# Patient Record
Sex: FEMALE | Race: BLACK OR AFRICAN AMERICAN | Employment: OTHER | URBAN - METROPOLITAN AREA
[De-identification: names, ages, dates, MRNs, and addresses within clinical notes are randomized per-mention and may not be internally consistent; named-entity substitution may affect disease eponyms.]

---

## 2017-04-20 ENCOUNTER — OFFICE VISIT (OUTPATIENT)
Dept: INTERNAL MEDICINE CLINIC | Age: 62
End: 2017-04-20

## 2017-04-20 VITALS
SYSTOLIC BLOOD PRESSURE: 110 MMHG | DIASTOLIC BLOOD PRESSURE: 60 MMHG | WEIGHT: 211 LBS | TEMPERATURE: 98.6 F | OXYGEN SATURATION: 94 % | HEART RATE: 89 BPM | HEIGHT: 63 IN | BODY MASS INDEX: 37.39 KG/M2

## 2017-04-20 DIAGNOSIS — R68.89 FLU-LIKE SYMPTOMS: Primary | ICD-10-CM

## 2017-04-20 LAB
QUICKVUE INFLUENZA TEST: POSITIVE
VALID INTERNAL CONTROL?: YES

## 2017-04-20 NOTE — MR AVS SNAPSHOT
Visit Information Date & Time Provider Department Dept. Phone Encounter #  
 4/20/2017  9:00 AM Rosario Chauhan MD Internist of [facility] [phone] [id number] Upcoming Health Maintenance Date Due Hepatitis C Screening 1955 BREAST CANCER SCRN MAMMOGRAM 7/8/2017 PAP AKA CERVICAL CYTOLOGY 6/16/2018 COLONOSCOPY 9/5/2022 DTaP/Tdap/Td series (2 - Td) 8/31/2026 Allergies as of 4/20/2017  Review Complete On: 9/1/2016 By: Rosario Chauhan MD  
  
 Severity Noted Reaction Type Reactions Advair Diskus [Fluticasone-salmeterol]   Intolerance Other (comments) Hoarseness Naproxen    Unable to Obtain Pcn [Penicillins]    Rash Current Immunizations  Reviewed on 8/28/2012 Name Date  
 TD Vaccine 1/1/1990 Not reviewed this visit You Were Diagnosed With   
  
 Codes Comments Flu-like symptoms    -  Primary ICD-10-CM: R68.89 ICD-9-CM: 780.99 Vitals BP Pulse Temp Height(growth percentile) Weight(growth percentile) SpO2  
 110/60 89 98.6 °F (37 °C) (Oral) 5' 3\" (1.6 m) 211 lb (95.7 kg) 94% BMI OB Status Smoking Status 37.38 kg/m2 Hysterectomy Former Smoker Vitals History BMI and BSA Data Body Mass Index Body Surface Area  
 37.38 kg/m 2 2.06 m 2 Preferred Pharmacy Pharmacy Name Phone [facility] [address] [place] [address] Your Updated Medication List  
  
   
This list is accurate as of: 4/20/17 10:17 AM.  Always use your most recent med list.  
  
  
  
  
 albuterol 90 mcg/actuation inhaler Commonly known as:  PROVENTIL HFA, VENTOLIN HFA, PROAIR HFA Take 2 Puffs by inhalation every four (4) hours as needed. beclomethasone 80 mcg/actuation [employer] Commonly known as:  QVAR Take 1 Puff by inhalation two (2) times a day. cyclobenzaprine 10 mg tablet Commonly known as:  FLEXERIL  
 Take 1 Tab by mouth three (3) times daily as needed for Muscle Spasm(s). ergocalciferol 50,000 unit capsule Commonly known as:  VITAMIN D2 Take 1 Cap by mouth every seven (7) days. furosemide 20 mg tablet Commonly known as:  LASIX One tablet daily  
  
 lidocaine-EPINEPHrine 1 %-1:100,000 injection Commonly known as:  XYLOCAINE 10 mL by IntraDERMal route once for 1 dose. Indications: ADMINISTRATION OF LOCAL ANESTHESIA  
  
 magnesium oxide 400 mg tablet Commonly known as:  MAG-OX Take 400 mg by mouth daily. meloxicam 15 mg tablet Commonly known as:  MOBIC Take 1 Tab by mouth daily as needed for Pain.  
  
 multivitamin tablet Commonly known as:  ONE A DAY Take 1 Tab by mouth daily. omeprazole 20 mg capsule Commonly known as:  PRILOSEC Take 1 Cap by mouth daily. potassium chloride 10 mEq tablet Commonly known as:  K-DUR, KLOR-CON Take 1 Tab by mouth daily. traMADol 50 mg tablet Commonly known as:  ULTRAM  
Take 1 Tab by mouth every six (6) hours as needed for Pain. Max Daily Amount: 200 mg. VITAMIN C 250 mg tablet Generic drug:  ascorbic acid (vitamin C) Take  by mouth. We Performed the Following AMB POC RAPID INFLUENZA TEST [09303 CPT(R)] Introducing [facility] & HEALTH SERVICES! Dear Steve Lees: 
Thank you for requesting a Xtime account. Our records indicate that you already have an active Xtime account. You can access your account anytime at [email or web address] Did you know that you can access your hospital and ER discharge instructions at any time in Xtime? You can also review all of your test results from your hospital stay or ER visit. Additional Information If you have questions, please visit the Frequently Asked Questions section of the Xtime website at [email or web address] Remember, Xtime is NOT to be used for urgent needs. For medical emergencies, dial 911. Now available from your iPhone and Android! Please provide this summary of care documentation to your next provider. Your primary care clinician is listed as Isaura Morales. If you have any questions after today's visit, please call [phone].

## 2017-04-20 NOTE — PROGRESS NOTES
1. Have you been to the ER, urgent care clinic or hospitalized since your last visit? NO.     2. Have you seen or consulted any other health care providers outside of the [address] [place] [address] since your last visit (Include any pap smears or colon screening)? NO      Do you have an Advanced Directive? NO    Would you like information on Advanced Directives?  YES

## 2017-04-20 NOTE — PROGRESS NOTES
57 y.o. BLACK OR  female who presents for evaluation. She is here complaining of 3 days of flulike syndrome. She has had numerous exposures at work apparently. Was actually at [employer] yesterday but there was an insurance snafu and they did not see her and told her she has flu but to go to the ER(?). Describes sore throat, overall achiness, mostly nonproductive cough although she has been bringing up some yellowish material occasionally the last day or so. No measured temperatures but she has been having chills. Minimal sinus congestion, no GI complaints. She did not take her flu shot this past year. Past Medical History:   Diagnosis Date    Asthma     Dr. Cruz [facility])     Lung Cancer, f/u Dr. Amee Ocampo.  Chronic obstructive pulmonary disease (HCC)     and right lung nodules, stable on serial CT last 6/14    Colon adenoma     2009 Dr. Dillon Cortez; adenoma and diverticulosis 9/12 Dr. Latricia Ariza FHx: breast cancer     Glaucoma     Dr Cristi Colbert Hypovitaminosis D 2011    IFG (impaired fasting glucose)     Morbid obesity (HCC)     peak weight 209 lbs, bmi 37 from 2/14    PUD (peptic ulcer disease)     h pylori tx'ed    Sleep apnea     on CPAP, Dr. Nonah Spurling 2010    Venous insufficiency      Current Outpatient Prescriptions   Medication Sig    furosemide (LASIX) 20 mg tablet One tablet daily    potassium chloride (K-DUR, KLOR-CON) 10 mEq tablet Take 1 Tab by mouth daily.  meloxicam (MOBIC) 15 mg tablet Take 1 Tab by mouth daily as needed for Pain.  omeprazole (PRILOSEC) 20 mg capsule Take 1 Cap by mouth daily.  cyclobenzaprine (FLEXERIL) 10 mg tablet Take 1 Tab by mouth three (3) times daily as needed for Muscle Spasm(s).  traMADol (ULTRAM) 50 mg tablet Take 1 Tab by mouth every six (6) hours as needed for Pain. Max Daily Amount: 200 mg.    lidocaine-EPINEPHrine (XYLOCAINE) 1 %-1:100,000 injection 10 mL by IntraDERMal route once for 1 dose.  Indications: ADMINISTRATION OF LOCAL ANESTHESIA    magnesium oxide (MAG-OX) 400 mg tablet Take 400 mg by mouth daily.  ascorbic acid (VITAMIN C) 250 mg tablet Take  by mouth.  beclomethasone (QVAR) 80 mcg/actuation inhaler Take 1 Puff by inhalation two (2) times a day.  ergocalciferol (VITAMIN D2) 50,000 unit capsule Take 1 Cap by mouth every seven (7) days.  albuterol (PROVENTIL HFA, VENTOLIN HFA) 90 mcg/actuation inhaler Take 2 Puffs by inhalation every four (4) hours as needed.  multivitamin (ONE A DAY) tablet Take 1 Tab by mouth daily. No current facility-administered medications for this visit. Allergies   Allergen Reactions    Advair Diskus [Fluticasone-Salmeterol] Other (comments)     Hoarseness    Naproxen Unable to Obtain    Pcn [Penicillins] Rash     Visit Vitals    /60    Pulse 89    Temp 98.6 °F (37 °C) (Oral)    Ht 5' 3\" (1.6 m)    Wt 211 lb (95.7 kg)    SpO2 94%    BMI 37.38 kg/m2    tympanic membrane's were normal.  Sinuses were nontender with mildly boggy mucosa, oropharynx otherwise benign, no adenopathy. Lungs are clear with good breath sounds. Heart showed regular rate rhythm. Abdomen soft and nontender, no hepatosplenomegaly or masses. Flu test was positive for influenza B    Assessment and plan:  1. Influenza but past the 2 day window. Symptomatic treatment for now with theraflu type meds and other OTC medications. I gave her a work excuse from 4/18-23/17. Call if progressively worsening symptoms. Emphasized vaccination when in season        Above conditions discussed at length and patient vocalized understanding.   All questions answered to patient satifaction

## 2017-04-26 ENCOUNTER — TELEPHONE (OUTPATIENT)
Dept: INTERNAL MEDICINE CLINIC | Age: 62
End: 2017-04-26

## 2017-04-27 ENCOUNTER — OFFICE VISIT (OUTPATIENT)
Dept: INTERNAL MEDICINE CLINIC | Age: 62
End: 2017-04-27

## 2017-04-27 VITALS
SYSTOLIC BLOOD PRESSURE: 112 MMHG | HEIGHT: 63 IN | WEIGHT: 213.4 LBS | RESPIRATION RATE: 16 BRPM | BODY MASS INDEX: 37.81 KG/M2 | OXYGEN SATURATION: 98 % | DIASTOLIC BLOOD PRESSURE: 74 MMHG | TEMPERATURE: 98.1 F | HEART RATE: 71 BPM

## 2017-04-27 DIAGNOSIS — J44.9 CHRONIC OBSTRUCTIVE PULMONARY DISEASE, UNSPECIFIED COPD TYPE (HCC): ICD-10-CM

## 2017-04-27 DIAGNOSIS — J04.0 LARYNGITIS: Primary | ICD-10-CM

## 2017-04-27 DIAGNOSIS — J11.1 INFLUENZA: ICD-10-CM

## 2017-04-27 DIAGNOSIS — R05.9 COUGH: ICD-10-CM

## 2017-04-27 RX ORDER — BENZONATATE 200 MG/1
200 CAPSULE ORAL
Qty: 30 CAP | Refills: 0 | Status: SHIPPED | OUTPATIENT
Start: 2017-04-27 | End: 2017-11-06

## 2017-04-27 NOTE — MR AVS SNAPSHOT
Visit Information Date & Time Provider Department Dept. Phone Encounter #  
 4/27/2017 10:30 AM Heather Rajput Internist of [address] [id number] Upcoming Health Maintenance Date Due Hepatitis C Screening 1955 BREAST CANCER SCRN MAMMOGRAM 7/8/2017 PAP AKA CERVICAL CYTOLOGY 6/16/2018 COLONOSCOPY 9/5/2022 DTaP/Tdap/Td series (2 - Td) 8/31/2026 Allergies as of 4/27/2017  Review Complete On: 4/27/2017 By: Vasquez Pickett LPN Severity Noted Reaction Type Reactions Advair Diskus [Fluticasone-salmeterol]   Intolerance Other (comments) Hoarseness Naproxen    Unable to Obtain Pcn [Penicillins]    Rash Current Immunizations  Reviewed on 8/28/2012 Name Date  
 TD Vaccine 1/1/1990 Not reviewed this visit You Were Diagnosed With   
  
 Codes Comments Laryngitis    -  Primary ICD-10-CM: J04.0 ICD-9-CM: 464.00 Cough     ICD-10-CM: R05 ICD-9-CM: 128. 2 Vitals BP Pulse Temp Resp Height(growth percentile) Weight(growth percentile) 112/74 (BP 1 Location: Left arm, BP Patient Position: Sitting) 71 98.1 °F (36.7 °C) (Oral) 16 5' 3\" (1.6 m) 213 lb 6.4 oz (96.8 kg) SpO2 BMI OB Status Smoking Status 98% 37.8 kg/m2 Hysterectomy Former Smoker BMI and BSA Data Body Mass Index Body Surface Area  
 37.8 kg/m 2 2.07 m 2 Preferred Pharmacy Pharmacy Name Phone [facility] [address] [facility] [address] Your Updated Medication List  
  
   
This list is accurate as of: 4/27/17 11:04 AM.  Always use your most recent med list.  
  
  
  
  
 albuterol 90 mcg/actuation inhaler Commonly known as:  PROVENTIL HFA, VENTOLIN HFA, PROAIR HFA Take 2 Puffs by inhalation every four (4) hours as needed. beclomethasone 80 mcg/actuation [employer] Commonly known as:  QVAR Take 1 Puff by inhalation two (2) times a day. benzonatate 200 mg capsule Commonly known as:  TESSALON Take 1 Cap by mouth three (3) times daily as needed for Cough. cyclobenzaprine 10 mg tablet Commonly known as:  FLEXERIL Take 1 Tab by mouth three (3) times daily as needed for Muscle Spasm(s). ergocalciferol 50,000 unit capsule Commonly known as:  VITAMIN D2 Take 1 Cap by mouth every seven (7) days. furosemide 20 mg tablet Commonly known as:  LASIX One tablet daily  
  
 lidocaine-EPINEPHrine 1 %-1:100,000 injection Commonly known as:  XYLOCAINE 10 mL by IntraDERMal route once for 1 dose. Indications: ADMINISTRATION OF LOCAL ANESTHESIA  
  
 magnesium oxide 400 mg tablet Commonly known as:  MAG-OX Take 400 mg by mouth daily. meloxicam 15 mg tablet Commonly known as:  MOBIC Take 1 Tab by mouth daily as needed for Pain.  
  
 multivitamin tablet Commonly known as:  ONE A DAY Take 1 Tab by mouth daily. omeprazole 20 mg capsule Commonly known as:  PRILOSEC Take 1 Cap by mouth daily. potassium chloride 10 mEq tablet Commonly known as:  K-DUR, KLOR-CON Take 1 Tab by mouth daily. traMADol 50 mg tablet Commonly known as:  ULTRAM  
Take 1 Tab by mouth every six (6) hours as needed for Pain. Max Daily Amount: 200 mg. VITAMIN C 250 mg tablet Generic drug:  ascorbic acid (vitamin C) Take  by mouth. Prescriptions Sent to Pharmacy Refills  
 benzonatate (TESSALON) 200 mg capsule 0 Sig: Take 1 Cap by mouth three (3) times daily as needed for Cough. Class: Normal  
 Pharmacy: [address] #: [phone] Route: Oral  
  
Introducing [facility] & HEALTH SERVICES! Dear Iovnne Clubs: 
Thank you for requesting a [employer] account. Our records indicate that you already have an active [employer] account. You can access your account anytime at [email or web address] Did you know that you can access your hospital and ER discharge instructions at any time in [employer]? You can also review all of your test results from your hospital stay or ER visit. Additional Information If you have questions, please visit the Frequently Asked Questions section of the LendingStar website at [email or web address] Remember, LendingStar is NOT to be used for urgent needs. For medical emergencies, dial 911. Now available from your iPhone and Android! Please provide this summary of care documentation to your next provider. Your primary care clinician is listed as Ethan Velazquez. If you have any questions after today's visit, please call [phone].

## 2017-04-27 NOTE — PROGRESS NOTES
HPI/History  Akanksha Carcamo is a 57 y.o.  female who presents for evaluation. Patient had typical flu sxs and tested positive for influenza B on 4/20 with Dr. Moni Robbins. She was past therapeutic range and not treated with tamiflu. Symptoms have been gradually improving but more recently she developed hoarseness and loss of voice. She still has a mild dry cough and has a history of COPD/asthma with medications below. No worsening pulmonary sxs and no fevers. No signs of strep. Her boss informed her to stay home but pt will need a work note. No other sxs or complaints. Patient Active Problem List   Diagnosis Code    Morbid obesity ([facility] [address] [facility] E66.01    Colon adenoma and diverticulosis Dr. Justin Lora 2012 D12.6    Sleep apnea Dr. Anjum Duarte 2010 G47.30    Venous insufficiency I87.2    Hypovitaminosis D E55.9    Hx of cancer of lung 2006 s/p resection Z85.118    IFG (impaired fasting glucose) R73.01    COPD (chronic obstructive pulmonary disease) (HCC) J44.9     Past Medical History:   Diagnosis Date    Asthma     Dr. Jose Cunningham [facility])     Lung Cancer, f/u Dr. Halie Garcia.  Chronic obstructive pulmonary disease (HCC)     and right lung nodules, stable on serial CT last 6/14    Colon adenoma     2009 Dr. Abel Mora; adenoma and diverticulosis 9/12 Dr. Shelly Turner FHx: breast cancer     Glaucoma     Dr Jaun Blakely Hypovitaminosis D 2011    IFG (impaired fasting glucose)     Morbid obesity (HCC)     peak weight 209 lbs, bmi 37 from 2/14    PUD (peptic ulcer disease)     h pylori tx'ed    Sleep apnea     on CPAP, Dr. Anjum Duarte 2010    Venous insufficiency      Past Surgical History:   Procedure Laterality Date    CARDIAC SURG PROCEDURE UNLIST  11/07    thallium negative    CT HEAD W CONT  11/07    negative    EGD      PUD on EGD, H pylori +txed 2009.  ENDOSCOPY, COLON, DIAGNOSTIC      Colon adenoma 2009, Dr. Kameron Beard.   adenoma and tics Dr. Justin Lora 9/12   [facility]  HX CATARACT REMOVAL      Dr Rah Marsh    HX HEENT      thyroid US negative 6/14    HX [address] [place] [address]    for fibroids    HX LOBECTOMY      for cancer, Dr. Chon Lopez 10/06. No adjuvant tx. She has another spot on the same side that is being followed every three months.  HX ORTHOPAEDIC      Left foot ([place]) and knee surgery x2 Dr. Wallace Toledo.  US DUPLEX LOWER EXT VEIN BILAT W MANEUVERS  6/11    negative     Social History     Social History    Marital status: SINGLE     Spouse name: N/A    Number of children: 0    Years of education: N/A     Occupational History    [place]       Social History Main Topics    Smoking status: Former Smoker     Packs/day: 0.50     Years: 20.00     Types: Cigarettes     Quit date: 1/1/1990    Smokeless tobacco: Never Used    Alcohol use No    Drug use: No    Sexual activity: Not on file     Other Topics Concern    Not on file     Social History Narrative     Family History   Problem Relation Age of Onset    Breast Cancer Mother     Hypertension Father     Other Brother      SLE    Other Brother      SLE    Other Brother      SLE     Current Outpatient Prescriptions   Medication Sig    benzonatate (TESSALON) 200 mg capsule Take 1 Cap by mouth three (3) times daily as needed for Cough.  furosemide (LASIX) 20 mg tablet One tablet daily    potassium chloride (K-DUR, KLOR-CON) 10 mEq tablet Take 1 Tab by mouth daily.  meloxicam (MOBIC) 15 mg tablet Take 1 Tab by mouth daily as needed for Pain.  omeprazole (PRILOSEC) 20 mg capsule Take 1 Cap by mouth daily.  cyclobenzaprine (FLEXERIL) 10 mg tablet Take 1 Tab by mouth three (3) times daily as needed for Muscle Spasm(s).  traMADol (ULTRAM) 50 mg tablet Take 1 Tab by mouth every six (6) hours as needed for Pain. Max Daily Amount: 200 mg.    magnesium oxide (MAG-OX) 400 mg tablet Take 400 mg by mouth daily.  ascorbic acid (VITAMIN C) 250 mg tablet Take  by mouth.     beclomethasone (QVAR) 80 mcg/actuation inhaler Take 1 Puff by inhalation two (2) times a day.  ergocalciferol (VITAMIN D2) 50,000 unit capsule Take 1 Cap by mouth every seven (7) days.  albuterol (PROVENTIL HFA, VENTOLIN HFA) 90 mcg/actuation inhaler Take 2 Puffs by inhalation every four (4) hours as needed.  multivitamin (ONE A DAY) tablet Take 1 Tab by mouth daily.  lidocaine-EPINEPHrine (XYLOCAINE) 1 %-1:100,000 injection 10 mL by IntraDERMal route once for 1 dose. Indications: ADMINISTRATION OF LOCAL ANESTHESIA     No current facility-administered medications for this visit. Allergies   Allergen Reactions    Advair Diskus [Fluticasone-Salmeterol] Other (comments)     Hoarseness    Naproxen Unable to Obtain    Pcn [Penicillins] Rash       Review of Systems  Aside from those included in HPI, remainder of ROS negative. Physical Examination  Visit Vitals    /74 (BP 1 Location: Left arm, BP Patient Position: Sitting)    Pulse 71    Temp 98.1 °F (36.7 °C) (Oral)    Resp 16    Ht 5' 3\" (1.6 m)    Wt 213 lb 6.4 oz (96.8 kg)    SpO2 98%    BMI 37.8 kg/m2       General - Alert and in no acute distress. Pt appears fairly well, comfortable, and in good spirits. Pleasant, engaging. Nontoxic. Not anxious, non-diaphoretic. Mental status - Appropriate mood, behavior, speech content, dress, and thought processes. Eyes - Pupils equal and reactive, extraocular movements intact. No erythema or discharge. Nose - No rhinorrhea. Mouth - Mucous membranes moist.  Oropharynx unremarkable. Loss of voice noted. Neck - Supple without rigidity. Lymph - No periauricular, perimandibular, or ant/post cervical tenderness or swelling. Pulm - Occasional dry cough. Complete sentences. No tachypnea, retractions, or cyanosis. Good respiratory effort. Clear to auscultation bilat. No appreciable wheezes, rales, or rhonchi currently. Cardiovascular - Normal rate, regular rhythm. No appreciable murmurs.     Assessment and Plan  1. Patient recently tested positive for influenza and has been gradually improving from this standpoint. However, she has more recently suffered laryngitis but doing fairly well overall. Strongly encouraged voice rest and supportive measures as discussed. Work note provided. Patient with mild cough without other pulmonary sxs; has a history of COPD/asthma. She will continue her current regimen and albuterol more regularly if needed; I provided tessalon. She will return/call if no improvement or if any developments. Further planning as needed. Pt happily agrees with plan. PLEASE NOTE:   This document has been produced using voice recognition software. Unrecognized errors in transcription may be present.     Geronimo Walker [employer] of [address] [place] [address]  [phone]  4/27/2017

## 2017-04-27 NOTE — LETTER
NOTIFICATION RETURN TO WORK / SCHOOL 
 
4/27/2017 11:00 AM 
 
Ms. Cristino Sesay [address] 
[address] To Whom It May Concern: 
 
Cristino Sesay is currently under the care of Merari Martinez. She will return to work/school on: 5/4/17 If there are questions or concerns please have the patient contact our office. Sincerely, ALONA Cheng

## 2017-08-30 ENCOUNTER — TELEPHONE (OUTPATIENT)
Dept: INTERNAL MEDICINE CLINIC | Age: 62
End: 2017-08-30

## 2017-08-30 DIAGNOSIS — Z00.00 PHYSICAL EXAM: ICD-10-CM

## 2017-08-30 DIAGNOSIS — R73.01 IFG (IMPAIRED FASTING GLUCOSE): Primary | ICD-10-CM

## 2017-08-30 DIAGNOSIS — E55.9 HYPOVITAMINOSIS D: ICD-10-CM

## 2017-08-30 NOTE — TELEPHONE ENCOUNTER
PT has a PE on 11/06/17- put lab orders in [facility]- PT goes to [email or web address]- mail lab orders

## 2017-10-27 LAB
25(OH)D3+25(OH)D2 SERPL-MCNC: 12.8 NG/ML (ref 30–100)
ALBUMIN SERPL-MCNC: 4.2 G/DL (ref 3.6–4.8)
ALBUMIN/GLOB SERPL: 1.3 {RATIO} (ref 1.2–2.2)
ALP SERPL-CCNC: 138 IU/L (ref 39–117)
ALT SERPL-CCNC: 16 IU/L (ref 0–32)
AST SERPL-CCNC: 16 IU/L (ref 0–40)
BILIRUB SERPL-MCNC: 0.3 MG/DL (ref 0–1.2)
BUN SERPL-MCNC: 14 MG/DL (ref 8–27)
BUN/CREAT SERPL: 20 (ref 12–28)
CALCIUM SERPL-MCNC: 9.2 MG/DL (ref 8.7–10.3)
CHLORIDE SERPL-SCNC: 102 MMOL/L (ref 96–106)
CHOLEST SERPL-MCNC: 212 MG/DL (ref 100–199)
CO2 SERPL-SCNC: 24 MMOL/L (ref 18–29)
CREAT SERPL-MCNC: 0.71 MG/DL (ref 0.57–1)
ERYTHROCYTE [DISTWIDTH] IN BLOOD BY AUTOMATED COUNT: 14 % (ref 12.3–15.4)
GFR SERPLBLD CREATININE-BSD FMLA CKD-EPI: 105 ML/MIN/1.73
GFR SERPLBLD CREATININE-BSD FMLA CKD-EPI: 91 ML/MIN/1.73
GLOBULIN SER CALC-MCNC: 3.2 G/DL (ref 1.5–4.5)
GLUCOSE SERPL-MCNC: 100 MG/DL (ref 65–99)
HBA1C MFR BLD: 5.2 % (ref 4.8–5.6)
HCT VFR BLD AUTO: 39.6 % (ref 34–46.6)
HDLC SERPL-MCNC: 92 MG/DL
HGB BLD-MCNC: 12.9 G/DL (ref 11.1–15.9)
INTERPRETATION, 910389: NORMAL
LDLC SERPL CALC-MCNC: 109 MG/DL (ref 0–99)
MCH RBC QN AUTO: 30.3 PG (ref 26.6–33)
MCHC RBC AUTO-ENTMCNC: 32.6 G/DL (ref 31.5–35.7)
MCV RBC AUTO: 93 FL (ref 79–97)
PLATELET # BLD AUTO: 253 X10E3/UL (ref 150–379)
POTASSIUM SERPL-SCNC: 4.4 MMOL/L (ref 3.5–5.2)
PROT SERPL-MCNC: 7.4 G/DL (ref 6–8.5)
RBC # BLD AUTO: 4.26 X10E6/UL (ref 3.77–5.28)
SODIUM SERPL-SCNC: 144 MMOL/L (ref 134–144)
TRIGL SERPL-MCNC: 53 MG/DL (ref 0–149)
VLDLC SERPL CALC-MCNC: 11 MG/DL (ref 5–40)
WBC # BLD AUTO: 5.4 X10E3/UL (ref 3.4–10.8)

## 2017-11-04 PROBLEM — E66.9 OBESITY (BMI 30-39.9): Status: ACTIVE | Noted: 2017-11-04

## 2017-11-06 ENCOUNTER — OFFICE VISIT (OUTPATIENT)
Dept: INTERNAL MEDICINE CLINIC | Age: 62
End: 2017-11-06

## 2017-11-06 VITALS
WEIGHT: 217.6 LBS | SYSTOLIC BLOOD PRESSURE: 128 MMHG | OXYGEN SATURATION: 97 % | HEART RATE: 81 BPM | BODY MASS INDEX: 38.55 KG/M2 | DIASTOLIC BLOOD PRESSURE: 87 MMHG | TEMPERATURE: 98.2 F | HEIGHT: 63 IN | RESPIRATION RATE: 14 BRPM

## 2017-11-06 DIAGNOSIS — E66.9 OBESITY (BMI 30-39.9): ICD-10-CM

## 2017-11-06 DIAGNOSIS — D12.6 COLON ADENOMA: ICD-10-CM

## 2017-11-06 DIAGNOSIS — R73.01 IFG (IMPAIRED FASTING GLUCOSE): ICD-10-CM

## 2017-11-06 DIAGNOSIS — J44.9 CHRONIC OBSTRUCTIVE PULMONARY DISEASE, UNSPECIFIED COPD TYPE (HCC): ICD-10-CM

## 2017-11-06 DIAGNOSIS — Z12.31 SCREENING MAMMOGRAM, ENCOUNTER FOR: ICD-10-CM

## 2017-11-06 DIAGNOSIS — I87.2 VENOUS INSUFFICIENCY: ICD-10-CM

## 2017-11-06 DIAGNOSIS — Z00.00 PHYSICAL EXAM: Primary | ICD-10-CM

## 2017-11-06 DIAGNOSIS — E55.9 HYPOVITAMINOSIS D: ICD-10-CM

## 2017-11-06 DIAGNOSIS — R07.9 CHEST PAIN, UNSPECIFIED TYPE: ICD-10-CM

## 2017-11-06 DIAGNOSIS — G47.33 OBSTRUCTIVE SLEEP APNEA SYNDROME: ICD-10-CM

## 2017-11-06 RX ORDER — OMEPRAZOLE 40 MG/1
40 CAPSULE, DELAYED RELEASE ORAL DAILY
Qty: 90 CAP | Refills: 3 | Status: SHIPPED | OUTPATIENT
Start: 2017-11-06 | End: 2018-07-18

## 2017-11-06 RX ORDER — ERGOCALCIFEROL 1.25 MG/1
50000 CAPSULE ORAL
Qty: 13 CAP | Refills: 3 | Status: SHIPPED | OUTPATIENT
Start: 2017-11-06 | End: 2018-11-20 | Stop reason: SDUPTHER

## 2017-11-06 NOTE — MR AVS SNAPSHOT
Visit Information Date & Time Provider Department Dept. Phone Encounter #  
 11/6/2017 11:30 AM Betty Larose MD [facility] [phone] [id number] Your Appointments 5/14/2018  1:45 PM  
Office Visit with Betty Larose MD  
[facility] [address]) Appt Note: 6 month f/u  
 [address] [facility], [address] [place]  
  
   
 [address] [facility] Upcoming Health Maintenance Date Due Hepatitis C Screening 1955 BREAST CANCER SCRN MAMMOGRAM 7/8/2017 INFLUENZA AGE 9 TO ADULT 8/1/2017 COLONOSCOPY 9/5/2022 DTaP/Tdap/Td series (2 - Td) 8/31/2026 Allergies as of 11/6/2017  Review Complete On: 11/6/2017 By: Vera Alonso Severity Noted Reaction Type Reactions Advair Diskus [Fluticasone-salmeterol]   Intolerance Other (comments) Hoarseness Naproxen    Unable to Obtain Pcn [Penicillins]    Rash Current Immunizations  Reviewed on 8/28/2012 Name Date  
 TD Vaccine 1/1/1990 Not reviewed this visit You Were Diagnosed With   
  
 Codes Comments Screening mammogram, encounter for    -  Primary ICD-10-CM: Z12.31 
ICD-9-CM: V76.12 Chronic obstructive pulmonary disease, unspecified COPD type ([facility]     ICD-10-CM: J44.9 ICD-9-CM: 024 Obesity (BMI 30-39. 9)     ICD-10-CM: E66.9 ICD-9-CM: 278.00 IFG (impaired fasting glucose)     ICD-10-CM: R73.01 
ICD-9-CM: 790.21 Colon adenoma     ICD-10-CM: D12.6 ICD-9-CM: 211.3 Hypovitaminosis D     ICD-10-CM: E55.9 ICD-9-CM: 268.9 Obstructive sleep apnea syndrome     ICD-10-CM: G47.33 
ICD-9-CM: 327.23 Venous insufficiency     ICD-10-CM: I87.2 ICD-9-CM: 459.81 Vitals BP Pulse Temp Resp Height(growth percentile) Weight(growth percentile)  128/87 (BP 1 Location: Left arm, BP Patient Position: Sitting) 81 98.2 °F (36.8 °C) (Oral) 14 5' 3\" (1.6 m) 217 lb 9.6 oz (98.7 kg) SpO2 BMI OB Status Smoking Status 97% 38.55 kg/m2 Hysterectomy Former Smoker Vitals History BMI and BSA Data Body Mass Index Body Surface Area 38.55 kg/m 2 2.09 m 2 Preferred Pharmacy Pharmacy Name Phone [facility] [address], [address] Your Updated Medication List  
  
   
This list is accurate as of: 11/6/17 12:36 PM.  Always use your most recent med list.  
  
  
  
  
 albuterol 90 mcg/actuation inhaler Commonly known as:  PROVENTIL HFA, VENTOLIN HFA, PROAIR HFA Take 2 Puffs by inhalation every four (4) hours as needed. beclomethasone 80 mcg/actuation [employer] Commonly known as:  QVAR Take 1 Puff by inhalation two (2) times a day. cyclobenzaprine 10 mg tablet Commonly known as:  FLEXERIL Take 1 Tab by mouth three (3) times daily as needed for Muscle Spasm(s). ergocalciferol 50,000 unit capsule Commonly known as:  VITAMIN D2 Take 1 Cap by mouth every seven (7) days. furosemide 20 mg tablet Commonly known as:  LASIX One tablet daily  
  
 lidocaine-EPINEPHrine 1 %-1:100,000 injection Commonly known as:  XYLOCAINE 10 mL by IntraDERMal route once for 1 dose. Indications: ADMINISTRATION OF LOCAL ANESTHESIA  
  
 magnesium oxide 400 mg tablet Commonly known as:  MAG-OX Take 400 mg by mouth daily. multivitamin tablet Commonly known as:  ONE A DAY Take 1 Tab by mouth daily. omeprazole 20 mg capsule Commonly known as:  PRILOSEC Take 1 Cap by mouth daily. potassium chloride 10 mEq tablet Commonly known as:  KLOR-CON Take 1 Tab by mouth daily. VITAMIN C 250 mg tablet Generic drug:  ascorbic acid (vitamin C) Take  by mouth. We Performed the Following REFERRAL TO GASTROENTEROLOGY [JSS39 Custom] To-Do List   
 11/04/2017 Imaging:  AIME MAMMO BI SCREENING INCL CAD Referral Information Referral ID Referred By Referred To  
  
 [id number] [facility], MD Dionicio Nevarez [address] [facility] [place], [address] Phone: [phone] Fax: [phone] Visits Status Start Date End Date 1 New Request 11/4/17 11/4/18 If your referral has a status of pending review or denied, additional information will be sent to support the outcome of this decision. Introducing [facility]! Dear Herber Case: 
Thank you for requesting a [employer] account. Our records indicate that you have previously registered for a [employer] account but its currently inactive. Please call our [employer] support line at [phone]. Additional Information If you have questions, please visit the Frequently Asked Questions section of the [employer] website at [email or web address] Remember, [employer] is NOT to be used for urgent needs. For medical emergencies, dial 911. Now available from your iPhone and Android! Please provide this summary of care documentation to your next provider. Your primary care clinician is listed as Mirella Braun. If you have any questions after today's visit, please call [phone].

## 2017-11-06 NOTE — PROGRESS NOTES
1. Have you been to the ER, urgent care clinic or hospitalized since your last visit? YES. [facility]17 [facility]    2. Have you seen or consulted any other health care providers outside of the [address] [place] [address] since your last visit (Include any pap smears or colon screening)? YES      Do you have an Advanced Directive? NO    Would you like information on Advanced Directives?  NO

## 2017-11-13 ENCOUNTER — HOSPITAL ENCOUNTER (OUTPATIENT)
Dept: MAMMOGRAPHY | Age: 62
Discharge: HOME | End: 2017-11-13
Attending: INTERNAL MEDICINE
Payer: COMMERCIAL

## 2017-11-13 DIAGNOSIS — Z12.31 VISIT FOR SCREENING MAMMOGRAM: ICD-10-CM

## 2017-11-13 PROCEDURE — 77067 SCR MAMMO BI INCL CAD: CPT

## 2017-11-29 ENCOUNTER — OFFICE VISIT (OUTPATIENT)
Dept: INTERNAL MEDICINE CLINIC | Age: 62
End: 2017-11-29

## 2017-11-29 VITALS
RESPIRATION RATE: 14 BRPM | SYSTOLIC BLOOD PRESSURE: 126 MMHG | DIASTOLIC BLOOD PRESSURE: 70 MMHG | HEIGHT: 63 IN | BODY MASS INDEX: 38.7 KG/M2 | OXYGEN SATURATION: 95 % | TEMPERATURE: 98.4 F | WEIGHT: 218.4 LBS | HEART RATE: 101 BPM
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
## 2017-11-29 DIAGNOSIS — S09.90XA INJURY OF HEAD, INITIAL ENCOUNTER: Primary | ICD-10-CM

## 2017-11-29 NOTE — MR AVS SNAPSHOT
Visit Information Date & Time Provider Department Dept. Phone Encounter #  
 11/29/2017 11:30 AM Kelsea Cochran [place] [facility] [place] [phone] [id number] Your Appointments 5/14/2018  1:45 PM  
Office Visit with El Ruano MD  
[facility]) Appt Note: 6 month f/u  
 [address] [facility], [address] [address] [address]  
  
   
 [address], [address] Upcoming Health Maintenance Date Due Hepatitis C Screening 1955 BREAST CANCER SCRN MAMMOGRAM 11/13/2019 COLONOSCOPY 9/5/2022 DTaP/Tdap/Td series (2 - Td) 8/31/2026 Allergies as of 11/29/2017  Review Complete On: 11/29/2017 By: ALONA Coulter Severity Noted Reaction Type Reactions Advair Diskus [Fluticasone-salmeterol]   Intolerance Other (comments) Hoarseness Naproxen    Unable to Obtain Pcn [Penicillins]    Rash Current Immunizations  Reviewed on 8/28/2012 Name Date  
 TD Vaccine 1/1/1990 Not reviewed this visit Vitals BP Pulse Temp Resp Height(growth percentile) Weight(growth percentile) 126/70 (BP 1 Location: Right arm, BP Patient Position: Sitting) (!) 101 98.4 °F (36.9 °C) (Oral) 14 5' 3\" (1.6 m) 218 lb 6.4 oz (99.1 kg) SpO2 BMI OB Status Smoking Status 95% 38.69 kg/m2 Hysterectomy Former Smoker Vitals History BMI and BSA Data Body Mass Index Body Surface Area  
 38.69 kg/m 2 2.1 m 2 Preferred Pharmacy [facility] [address] [facility] [address] Your Updated Medication List  
  
   
This list is accurate as of: 11/29/17 11:47 AM.  Always use your most recent med list.  
  
  
  
  
 albuterol 90 mcg/actuation inhaler Commonly known as:  PROVENTIL HFA, VENTOLIN HFA, PROAIR HFA Take 2 Puffs by inhalation every four (4) hours as needed. beclomethasone 80 mcg/actuation [employer] Commonly known as:  QVAR Take 1 Puff by inhalation two (2) times a day. cyclobenzaprine 10 mg tablet Commonly known as:  FLEXERIL Take 1 Tab by mouth three (3) times daily as needed for Muscle Spasm(s). ergocalciferol 50,000 unit capsule Commonly known as:  VITAMIN D2 Take 1 Cap by mouth every seven (7) days. furosemide 20 mg tablet Commonly known as:  LASIX One tablet daily  
  
 lidocaine-EPINEPHrine 1 %-1:100,000 injection Commonly known as:  XYLOCAINE 10 mL by IntraDERMal route once for 1 dose. Indications: ADMINISTRATION OF LOCAL ANESTHESIA  
  
 magnesium oxide 400 mg tablet Commonly known as:  MAG-OX Take 400 mg by mouth daily. multivitamin tablet Commonly known as:  ONE A DAY Take 1 Tab by mouth daily. omeprazole 40 mg capsule Commonly known as:  PRILOSEC Take 1 Cap by mouth daily. potassium chloride 10 mEq tablet Commonly known as:  KLOR-CON Take 1 Tab by mouth daily. VITAMIN C 250 mg tablet Generic drug:  ascorbic acid (vitamin C) Take  by mouth. Introducing [facility] & HEALTH SERVICES! Dear Anuja Huitron: 
Thank you for requesting a [employer] account. Our records indicate that you have previously registered for a [employer] account but its currently inactive. Please call our [employer] support line at [phone]. Additional Information If you have questions, please visit the Frequently Asked Questions section of the [employer] website at [email or web address] ([email or web address])[email or web address] Remember, [employer] is NOT to be used for urgent needs. For medical emergencies, dial 911. Now available from your iPhone and Android! Please provide this summary of care documentation to your next provider. Your primary care clinician is listed as Charles Bales. If you have any questions after today's visit, please call [phone].

## 2017-11-29 NOTE — PROGRESS NOTES
HPI/History  Sulma Newman is a 60 y.o.  female who presents for evaluation. Pt reports tripping over a sidewalk yesterday morning while still dark and fell. She hit her left eyebrow but sustained no other injuries. Localized area became swollen and tender with no significant bruising. Miniscule abrasions without bleeding. Area  but this and the swelling are improving. No issues with vision, significant or worsening HAs, or other neurological or concerning sxs. Not on blood thinners. No other complaints. Patient Active Problem List   Diagnosis Code    Colon adenoma and diverticulosis Dr. Migdalia Dewitt 2012 D12.6    Sleep apnea Dr. Kristen Pallas 2010 G47.30    Venous insufficiency I87.2    Hypovitaminosis D E55.9    Hx of cancer of lung 2006 s/p resection Z85.118    IFG (impaired fasting glucose) R73.01    COPD (chronic obstructive pulmonary disease) (HCC) J44.9    Obesity (BMI 30-39. 9) E66.9     Past Medical History:   Diagnosis Date    Asthma     Dr. Estefanía Montgomery [facility])     Lung Cancer, f/u Dr. Rosa Isela Coelho.  Chronic obstructive pulmonary disease (HCC)     and right lung nodules, stable on serial CT last 6/14    Colon adenoma     2009 Dr. Susanna Murray; adenoma and diverticulosis 9/12 Dr. April Martines FHx: breast cancer     Glaucoma     Dr Levar Johnson Hypovitaminosis D 2011    IFG (impaired fasting glucose)     Morbid obesity (HCC)     peak weight 209 lbs, bmi 37 from 2/14    PUD (peptic ulcer disease)     h pylori tx'ed    Sleep apnea     on CPAP, Dr. Kristen Pallas 2010    Venous insufficiency      Past Surgical History:   Procedure Laterality Date    CARDIAC SURG PROCEDURE UNLIST  11/07    thallium negative    CT HEAD W CONT  11/07    negative    EGD      PUD on EGD, H pylori +txed 2009.  ENDOSCOPY, COLON, DIAGNOSTIC      Colon adenoma 2009, Dr. Zack Francisco.   adenoma and tics Dr. Migdalia Dewitt 9/12    Hakeem Samson    HX HEENT thyroid US negative 6/14    HX HYSTERECTOMY  1993    for fibroids    HX LOBECTOMY      for cancer, Dr. Rosa Isela Coelho 10/06. No adjuvant tx. She has another spot on the same side that is being followed every three months.  HX ORTHOPAEDIC      Left foot ([place]) and knee surgery x2 Dr. Hussain Ibrahim.  US DUPLEX LOWER EXT VEIN BILAT W MANEUVERS  6/11    negative     Social History     Social History    Marital status: SINGLE     Spouse name: N/A    Number of children: 0    Years of education: N/A     Occupational History    [place]       Social History Main Topics    Smoking status: Former Smoker     Packs/day: 0.50     Years: 20.00     Types: Cigarettes     Quit date: 1/1/1990    Smokeless tobacco: Never Used    Alcohol use No    Drug use: No    Sexual activity: Not on file     Other Topics Concern    Not on file     Social History Narrative     Family History   Problem Relation Age of Onset    Breast Cancer Mother     Other Brother      SLE    Other Brother      SLE    Other Brother      SLE    Hypertension Father      Current Outpatient Prescriptions   Medication Sig    ergocalciferol (VITAMIN D2) 50,000 unit capsule Take 1 Cap by mouth every seven (7) days.  furosemide (LASIX) 20 mg tablet One tablet daily    potassium chloride (K-DUR, KLOR-CON) 10 mEq tablet Take 1 Tab by mouth daily.  cyclobenzaprine (FLEXERIL) 10 mg tablet Take 1 Tab by mouth three (3) times daily as needed for Muscle Spasm(s).  magnesium oxide (MAG-OX) 400 mg tablet Take 400 mg by mouth daily.  ascorbic acid (VITAMIN C) 250 mg tablet Take  by mouth.  beclomethasone (QVAR) 80 mcg/actuation inhaler Take 1 Puff by inhalation two (2) times a day.  albuterol (PROVENTIL HFA, VENTOLIN HFA) 90 mcg/actuation inhaler Take 2 Puffs by inhalation every four (4) hours as needed.  multivitamin (ONE A DAY) tablet Take 1 Tab by mouth daily.  omeprazole (PRILOSEC) 40 mg capsule Take 1 Cap by mouth daily.  lidocaine-EPINEPHrine (XYLOCAINE) 1 %-1:100,000 injection 10 mL by IntraDERMal route once for 1 dose. Indications: ADMINISTRATION OF LOCAL ANESTHESIA     No current facility-administered medications for this visit. Allergies   Allergen Reactions    Advair Diskus [Fluticasone-Salmeterol] Other (comments)     Hoarseness    Naproxen Unable to Obtain    Pcn [Penicillins] Rash       Review of Systems  Aside from those included in HPI, remainder of complete ROS negative. Physical Examination  Visit Vitals    /70 (BP 1 Location: Right arm, BP Patient Position: Sitting)    Pulse (!) 101    Temp 98.4 °F (36.9 °C) (Oral)    Resp 14    Ht 5' 3\" (1.6 m)    Wt 218 lb 6.4 oz (99.1 kg)    SpO2 95%    BMI 38.69 kg/m2     General - Alert and in no acute distress. Pt appears well, comfortable, and in good spirits. Pleasant, engaging. Nontoxic. Not anxious, non-diaphoretic. Mental status - Appropriate mood, behavior, speech content, dress, and thought processes. ~5-7 extremely small abrasions which are very superficial almost to the point of not breaking skin noted around lateral left eyebrow and left forehead. Minimal swelling currently which is soft and mildly tender. No overt bruising/discoloration or warmth. No palpable bony abnormalities. No Raccoon or [place]. No other signs of injury. Pupils equal and reactive, extraocular movements intact. Vision intact. No other findings, including focal or movement disorder, noted. Assessment and Plan  1. Fall with head injury - Affected area around left lateral eyebrow; no other injuries. Looks to be soft tissue with no sign of fracture. No neurological or other concerning sxs. Discussed care for the very superficial abrasions along with supportive measures such as analgesics and cold compresses for the area. She will monitor and return/call if any ominous developments as discussed. Further planning as warranted. Pt happily agrees with plan.     PLEASE NOTE: This document has been produced using voice recognition software. Unrecognized errors in transcription may be present.     Tenisha Charly [employer] [facility]  [phone]  11/29/2017

## 2017-11-29 NOTE — PROGRESS NOTES
1. Have you been to the ER, urgent care clinic or hospitalized since your last visit? NO.     2. Have you seen or consulted any other health care providers outside of the [facility] since your last visit (Include any pap smears or colon screening)? NO      Do you have an Advanced Directive? NO    Would you like information on Advanced Directives?  NO

## 2018-04-13 ENCOUNTER — OFFICE VISIT (OUTPATIENT)
Dept: INTERNAL MEDICINE CLINIC | Age: 63
End: 2018-04-13

## 2018-04-13 ENCOUNTER — OFFICE VISIT (OUTPATIENT)
Dept: VASCULAR SURGERY | Age: 63
End: 2018-04-13

## 2018-04-13 ENCOUNTER — TELEPHONE (OUTPATIENT)
Dept: INTERNAL MEDICINE CLINIC | Age: 63
End: 2018-04-13

## 2018-04-13 VITALS
SYSTOLIC BLOOD PRESSURE: 122 MMHG | OXYGEN SATURATION: 97 % | DIASTOLIC BLOOD PRESSURE: 68 MMHG | RESPIRATION RATE: 18 BRPM | BODY MASS INDEX: 38.8 KG/M2 | HEART RATE: 81 BPM | TEMPERATURE: 97.7 F | HEIGHT: 63 IN | WEIGHT: 219 LBS

## 2018-04-13 DIAGNOSIS — M79.671 RIGHT FOOT PAIN: Primary | ICD-10-CM

## 2018-04-13 DIAGNOSIS — M25.474 SWELLING OF FOOT JOINT, RIGHT: ICD-10-CM

## 2018-04-13 DIAGNOSIS — M79.671 RIGHT FOOT PAIN: ICD-10-CM

## 2018-04-13 DIAGNOSIS — G89.29 CHRONIC PAIN OF RIGHT ANKLE: Primary | ICD-10-CM

## 2018-04-13 DIAGNOSIS — M25.571 CHRONIC PAIN OF RIGHT ANKLE: Primary | ICD-10-CM

## 2018-04-13 RX ORDER — PREDNISONE 20 MG/1
20 TABLET ORAL DAILY
Qty: 5 TAB | Refills: 0 | Status: SHIPPED | OUTPATIENT
Start: 2018-04-13 | End: 2018-07-18

## 2018-04-13 RX ORDER — HYDROCODONE BITARTRATE AND ACETAMINOPHEN 5; 325 MG/1; MG/1
1 TABLET ORAL
Qty: 30 TAB | Refills: 0 | Status: SHIPPED | OUTPATIENT
Start: 2018-04-13 | End: 2018-07-18

## 2018-04-13 RX ORDER — INDOMETHACIN 50 MG/1
CAPSULE ORAL 3 TIMES DAILY
COMMUNITY
End: 2018-07-18

## 2018-04-13 NOTE — PROGRESS NOTES
60 y.o. BLACK OR  female who presents for evaluation. She reports that she injured herself in [facility] time frame while at work. 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 She hurt her foot then but hurt her bilat knees and left shoulder more. She was referred to Dr Agus Mckinney and received meds and pills and eventually gotr better. However, the right foot has become more problematic. It's hurt she claims the whole time. In the last 1-2 weeks, she's noted swelling in the dorsum of the foot fionally ended up going to see urgent care on 4/11/18. Films were neg for fx, she was given indocin 50 tid prn but it has not helped. Reports no redfness, red streaking, fever. She can walk and bear weight. There;s swelling in the ankle/calf but not much worse than the left side    Past Medical History:   Diagnosis Date    Asthma     Dr. Stefanie Medeiros [facility])     Lung Cancer, f/u Dr. Jean-Paul Dallas.  Chronic obstructive pulmonary disease (HCC)     and right lung nodules, stable on serial CT last 6/14    Colon adenoma     2009 Dr. Amy Jansen; adenoma and diverticulosis 9/12 Dr. Shala Chamorro; 12/26/17    FHx: breast cancer     Glaucoma     Dr Aurora Gaines Hypovitaminosis D 2011    IFG (impaired fasting glucose)     Morbid obesity (HCC)     peak weight 209 lbs, bmi 37 from 2/14    PUD (peptic ulcer disease)     h pylori tx'ed    Sleep apnea     on CPAP, Dr. Kenya Richter 2010    Venous insufficiency      Past Surgical History:   Procedure Laterality Date    CARDIAC SURG PROCEDURE UNLIST  11/07    thallium negative    CT HEAD W CONT  11/07    negative    EGD      PUD on EGD, H pylori +txed 2009.       Mitul Soria 2009 adenoma; Dr Shala Chamorro 9/12  adenoma and tics; 12/26/17 sessile serrated and tubular adenomas and tics    HX HEENT      thyroid US negative 6/14    HX HYSTERECTOMY  1993    for fibroids    HX LOBECTOMY      for cancer, Dr. Juany Galdamez 10/06. No adjuvant tx. She has another spot on the same side that is being followed every three months.  HX ORTHOPAEDIC      Left foot ([place]) and knee surgery x2 Dr. Marlo Dubon.  US DUPLEX LOWER EXT VEIN BILAT W MANEUVERS  6/11    negative     Social History     Social History    Marital status: SINGLE     Spouse name: N/A    Number of children: 0    Years of education: N/A     Occupational History    [place]       Social History Main Topics    Smoking status: Former Smoker     Packs/day: 0.50     Years: 20.00     Types: Cigarettes     Quit date: 1/1/1990    Smokeless tobacco: Never Used    Alcohol use No    Drug use: No    Sexual activity: Not on file     Other Topics Concern    Not on file     Social History Narrative     Current Outpatient Prescriptions   Medication Sig    indomethacin (INDOCIN) 50 mg capsule Take  by mouth three (3) times daily.  HYDROcodone-acetaminophen (NORCO) 5-325 mg per tablet Take 1 Tab by mouth every eight (8) hours as needed for Pain. Max Daily Amount: 3 Tabs.  predniSONE (DELTASONE) 20 mg tablet Take 1 Tab by mouth daily.  omeprazole (PRILOSEC) 40 mg capsule Take 1 Cap by mouth daily.  ergocalciferol (VITAMIN D2) 50,000 unit capsule Take 1 Cap by mouth every seven (7) days.  furosemide (LASIX) 20 mg tablet One tablet daily    potassium chloride (K-DUR, KLOR-CON) 10 mEq tablet Take 1 Tab by mouth daily.  cyclobenzaprine (FLEXERIL) 10 mg tablet Take 1 Tab by mouth three (3) times daily as needed for Muscle Spasm(s).  magnesium oxide (MAG-OX) 400 mg tablet Take 400 mg by mouth daily.  ascorbic acid (VITAMIN C) 250 mg tablet Take  by mouth.  beclomethasone (QVAR) 80 mcg/actuation inhaler Take 1 Puff by inhalation two (2) times a day.     albuterol (PROVENTIL HFA, VENTOLIN HFA) 90 mcg/actuation inhaler Take 2 Puffs by inhalation every four (4) hours as needed.  multivitamin (ONE A DAY) tablet Take 1 Tab by mouth daily.  lidocaine-EPINEPHrine (XYLOCAINE) 1 %-1:100,000 injection 10 mL by IntraDERMal route once for 1 dose. Indications: ADMINISTRATION OF LOCAL ANESTHESIA     No current facility-administered medications for this visit. Allergies   Allergen Reactions    Advair Diskus [Fluticasone-Salmeterol] Other (comments)     Hoarseness    Naproxen Unable to Obtain    Pcn [Penicillins] Rash     REVIEW OF SYSTEMS:   Ophtho  no vision change or eye pain  Oral  no mouth pain, tongue or tooth problems  Ears  no hearing loss, ear pain, fullness, no swallowing problems  Cardiac  no CP, PND, orthopnea, edema, palpitations or syncope  Chest  no breast masses  Resp  no wheezing, chronic coughing, dyspnea  GI  no heartburn, nausea, vomiting, change in bowel habits, bleeding, hemorrhoids  Urinary  no dysuria, hematuria, flank pain, urgency, frequency  Genitals  no genital lesions, discharge, masses, ulceration, warts    Visit Vitals    /68 (BP 1 Location: Left arm, BP Patient Position: Sitting)    Pulse 81    Temp 97.7 °F (36.5 °C) (Oral)    Resp 18    Ht 5' 3\" (1.6 m)    Wt 219 lb (99.3 kg)    SpO2 97%    BMI 38.79 kg/m2   edema 1-2+ in dorsum of the right foot extending into the ankle. No clear redness but marked soft tissue tenderness in the dorsum and in the ankle sheri, neg sheridan's, pulses 2+, no clear signs of cellulitis otherwise. rom dec for ankle and foot due to pain. Left foot with 0-1+ ankle adema, otherwise normal and full rom    Assessment and plan:  1. Right foot/ankle pain and swelling xmonths although acutely worse. Will get doppler r/o dvt. Doubt cellulitis so hold off abx. Check labs, empiric prednisone and norco for pain while awaiting consult Dr Madeleine Montiel. Work excuse Marlee Gave, call w update      Above conditions discussed at length and patient vocalized understanding.   All questions answered to patient satisfaction

## 2018-04-13 NOTE — PROCEDURES
[facility]  *** FINAL REPORT ***    Name: Mary Urban  MRN: [id number]       Outpatient  : 04 May 1955  HIS Order #: [id number]  [address] [facility] Visit #: [id number]  Date: 2018    TYPE OF TEST: Peripheral Venous Testing    REASON FOR TEST  Edema, Limb Pain    Right Leg:-  Deep venous thrombosis:           No  Superficial venous thrombosis:    Not examined  Deep venous insufficiency:        Not examined  Superficial venous insufficiency: Not examined      INTERPRETATION/FINDINGS  Duplex images were obtained using 2-D gray scale, color flow and  spectral doppler analysis. Right leg :  1. No evidence of deep vein thrombosis in the right common femoral,  proximal deep femoral, femoral, popliteal, posterior tibial and  peroneal veins. 2. Biphasic right posterior tibial artery flow. 3. Patent contralateral common femoral vein with normal venous  hemodynamics. ADDITIONAL COMMENTS    I have personally reviewed the data relevant to the interpretation of  this  study. TECHNOLOGIST: Aftab Weston RVT, RDMS  Signed: 2018 03:07 PM    PHYSICIAN: Rufino Polk MD  Signed: 2018 08:29 AM

## 2018-04-13 NOTE — PROGRESS NOTES
1. Have you been to the ER, urgent care clinic or hospitalized since your last visit? YES. [facility] 4/7/18    2. Have you seen or consulted any other health care providers outside of the [address] [place] [address] since your last visit (Include any pap smears or colon screening)? NO      Do you have an Advanced Directive? NO    Would you like information on Advanced Directives? NO    Chief Complaint   Patient presents with    Foot Pain     right foot pain. Pt stated the whole foot hurts.  She has had 2 injuries to it in the past. [facility] and [facility] stated she had a foot sprain

## 2018-04-13 NOTE — TELEPHONE ENCOUNTER
[facility] calling says they need a authorization for the patients Ultrasound for today at 2:45pm.    [address] [place] [address] Advise   Fax [phone]  Phone [phone]

## 2018-04-14 LAB
ANA SER QL: NEGATIVE
BUN SERPL-MCNC: 12 MG/DL (ref 8–27)
BUN/CREAT SERPL: 21 (ref 12–28)
CALCIUM SERPL-MCNC: 9 MG/DL (ref 8.7–10.3)
CHLORIDE SERPL-SCNC: 103 MMOL/L (ref 96–106)
CO2 SERPL-SCNC: 26 MMOL/L (ref 18–29)
CREAT SERPL-MCNC: 0.57 MG/DL (ref 0.57–1)
ERYTHROCYTE [DISTWIDTH] IN BLOOD BY AUTOMATED COUNT: 13.3 % (ref 12.3–15.4)
ERYTHROCYTE [SEDIMENTATION RATE] IN BLOOD BY WESTERGREN METHOD: 19 MM/HR (ref 0–40)
GFR SERPLBLD CREATININE-BSD FMLA CKD-EPI: 114 ML/MIN/1.73
GFR SERPLBLD CREATININE-BSD FMLA CKD-EPI: 99 ML/MIN/1.73
GLUCOSE SERPL-MCNC: 100 MG/DL (ref 65–99)
HCT VFR BLD AUTO: 35 % (ref 34–46.6)
HGB BLD-MCNC: 11.6 G/DL (ref 11.1–15.9)
MCH RBC QN AUTO: 30.3 PG (ref 26.6–33)
MCHC RBC AUTO-ENTMCNC: 33.1 G/DL (ref 31.5–35.7)
MCV RBC AUTO: 91 FL (ref 79–97)
PLATELET # BLD AUTO: 274 X10E3/UL (ref 150–379)
POTASSIUM SERPL-SCNC: 4.4 MMOL/L (ref 3.5–5.2)
RBC # BLD AUTO: 3.83 X10E6/UL (ref 3.77–5.28)
SEE BELOW:, 164879: NORMAL
SODIUM SERPL-SCNC: 142 MMOL/L (ref 134–144)
URATE SERPL-MCNC: 6 MG/DL (ref 2.5–7.1)
WBC # BLD AUTO: 6.5 X10E3/UL (ref 3.4–10.8)

## 2018-04-17 ENCOUNTER — OFFICE VISIT (OUTPATIENT)
Dept: ORTHOPEDIC SURGERY | Age: 63
End: 2018-04-17

## 2018-04-17 VITALS
OXYGEN SATURATION: 98 % | DIASTOLIC BLOOD PRESSURE: 87 MMHG | WEIGHT: 215.4 LBS | HEIGHT: 63 IN | BODY MASS INDEX: 38.16 KG/M2 | SYSTOLIC BLOOD PRESSURE: 148 MMHG | RESPIRATION RATE: 16 BRPM | HEART RATE: 77 BPM | TEMPERATURE: 97.4 F

## 2018-04-17 DIAGNOSIS — M79.671 RIGHT FOOT PAIN: ICD-10-CM

## 2018-04-17 DIAGNOSIS — M19.071 PRIMARY OSTEOARTHRITIS OF RIGHT FOOT: Primary | ICD-10-CM

## 2018-04-17 DIAGNOSIS — M25.474 SWELLING OF FOOT JOINT, RIGHT: ICD-10-CM

## 2018-04-17 RX ORDER — TRAMADOL HYDROCHLORIDE 50 MG/1
50-100 TABLET ORAL
Qty: 20 TAB | Refills: 0 | Status: SHIPPED | OUTPATIENT
Start: 2018-04-17 | End: 2018-07-18

## 2018-04-17 NOTE — LETTER
NOTIFICATION RETURN TO WORK / SCHOOL 
 
4/17/2018 11:43 AM 
 
Ms. Ericka Arias [address] 
[address] [place] [address] To Whom It May Concern: 
 
Ericka Arias is currently under the care of [address] [place] [address] Guy Gonzalez. Please allow Ms. Ambrosio Vail to remain out of work for 2 weeks. If there are questions or concerns please have the patient contact our office.  
 
 
 
Sincerely, 
 
 
Antwan Fleming MD

## 2018-04-17 NOTE — PROGRESS NOTES
AMBULATORY PROGRESS NOTE      Patient: Adaline Olszewski             MRN: [id number]     SSN: [id number] Body mass index is 38.16 kg/(m^2). YOB: 1955     AGE: 60 y.o. EX: female    PCP: Emi Reyes MD    IMPRESSION/DIAGNOSIS AND TREATMENT PLAN     DIAGNOSES  1. Arthritis, midfoot    2. Right foot pain    3. Swelling of foot joint, right        Orders Placed This Encounter    MRI FOOT RT WO CONT    traMADol (ULTRAM) 50 mg tablet      Adaline Olszewski understands her diagnoses and the proposed plan. My overall impression is a 50-year-old female who has pain and discomfort in the right forefoot and midfoot with concomitant swelling. She has had this for many months now despite activity modification, shoe wear changes, and over-the-counter anti-inflammatories. It is to be noted that she had fallen and hit he right foot on a pallet. She fell and has been having difficulty since her injury that occurred towards the end of December 2017, she states. She has had blood work in the past, she states, to check her ESR, and this was done on April 13, 2018. Her ESR was normal at 19. Her uric acid level was normal at 6.0. Her NAFISA was negative. Her metabolic panel was unremarkable revealing normal renal function, normal electrolytes, and her CBC with differential was also normal with a white count of 6.5 on that same date of April 13, 2018. In the past, she has had a vitamin D level that was 12.8. It was low. This was obtained on October 26, 2017. As such, again, my recommendation for her is going to be an MRI of her right foot to assess the metatarsals, assess the midfoot swelling, to make sure she does not have any evolving and/or occult stress fracture. Options would include providing a short CAM walker boot, modified activities. We did write a note to keep her out of work until further notice.  Tramadol prescription was written 50 mg one po bid prn severe pain, #20 only, no refills. Plan:    1) MRI without IV contrast of the right foot. 2) Work Note: Please allow her to remain out of work until further notice. 3) Tramadol 50 m-2 PO BID PRN; 20 tablets, 0 refills. RTO - After MRI    HPI AND EXAMINATION     Terell Wright IS A 60 y.o. female who presents to my outpatient office complaining of right foot pain. Patient reports that her right foot was ran over by a forklift over 5 years ago. Towards the end of 2017, she fell after her right knee gave out, and she noticed her right foot went into a palate as she fell. Since then, she has been working while having a difficult time walking due to pain. She went to [facility] and had XR done. Subsequently, she followed up with Dr. Judy Rodriguez who referred her here. She c/o right midfoot throbbing pain. Denies fever, shakes or chills. Denies h/o bariatric procedures. Patient reports a h/o GI ulcers. She arrives here today with x-rays from [facility] on a CD/DVD. These x-rays are of her right foot, three views, non-weightbearing, AP, lateral, and oblique. These films reveal degenerative spurring and a large calcific bone spur seen at the right Achilles tendon insertion point, moderate bone spur seen at the inferior surface of the calcaneus. There is some spurring to the greater than talar side of the talonavicular region with some small dorsal bone spurs of the talonavicular region on these non-weightbearing films. This is on the lateral film. The oblique film reveals some OA changes seen to the right #2 and #3 TMT joint articulation and a large accessory peroneum. The AP film reveals some mild OA to the midfoot and some spurring to the fifth metatarsal base, mild metatarsal adductus, and some prominent bone to the medial portion of the tarsal navicular primarily. Patient works for the [employer].     Terell Wright is alert/oriented (name, location, time) and follows commands well. she  is in no acute distress and her affect and mood are appropriate. Right ANKLE and FOOT       Gait: slow  Tenderness: moderate tenderness across multiple metatarsal heads. Cutaneous: No rashes, skin patches, wounds, or abrasions to the lower legs           Warm and Normal color. No regions of expressible drainage. Medial Border of Tibia Region: absent           Skin color, texture, turgor normal. Normal.           Mild swelling from forefoot to midfoot. Joint Motion: ROM Ankle:Normal , Hindfoot: (ST,TN,CC Normal}, Forefoot toes:Decreased  Neurologic Exam: Neuro: Motor: normal 5/5 strength in all tested muscle groups and Sensory : no sensory deficits noted. Contractures: Gastrocnemius or Achilles Contractures absent  Joint / Tendon Stability: No Ankle or Subtalar instability or joint laxity. No peroneal sublux ability or dislocation  Alignment: Slight Pes Planus  Vascular: Normal Pulses/ NL Capillary refill, No evidence of DVT seen on physical exam.   No calf swelling, no tenderness to calf muscles. Lymphatic:  No Evidence of Lymphedema. CHART REVIEW     Past Medical History:   Diagnosis Date    Asthma     Dr. Saldana Stamp [facility])     Lung Cancer, f/u Dr. Alma Delia Pitt.  Chronic obstructive pulmonary disease (HCC)     and right lung nodules, stable on serial CT last 6/14    Colon adenoma     2009 Dr. Dion Loco; adenoma and diverticulosis 9/12 Dr. Kole Wasserman; 12/26/17    FHx: breast cancer     Glaucoma     Dr Iveth Gastelum Hypovitaminosis D 2011    IFG (impaired fasting glucose)     Morbid obesity (HCC)     peak weight 209 lbs, bmi 37 from 2/14    PUD (peptic ulcer disease)     h pylori tx'ed    Sleep apnea     on CPAP, Dr. Jacqueline Collins 2010    Venous insufficiency      Current Outpatient Prescriptions   Medication Sig    traMADol (ULTRAM) 50 mg tablet Take 1-2 Tabs by mouth two (2) times daily as needed for Pain.  Max Daily Amount: 200 mg. **DO NOT TAKE WITH FLEXERIL**    indomethacin (INDOCIN) 50 mg capsule Take  by mouth three (3) times daily.  omeprazole (PRILOSEC) 40 mg capsule Take 1 Cap by mouth daily.  magnesium oxide (MAG-OX) 400 mg tablet Take 400 mg by mouth daily.  ascorbic acid (VITAMIN C) 250 mg tablet Take  by mouth.  beclomethasone (QVAR) 80 mcg/actuation inhaler Take 1 Puff by inhalation two (2) times a day.  albuterol (PROVENTIL HFA, VENTOLIN HFA) 90 mcg/actuation inhaler Take 2 Puffs by inhalation every four (4) hours as needed.  multivitamin (ONE A DAY) tablet Take 1 Tab by mouth daily.  HYDROcodone-acetaminophen (NORCO) 5-325 mg per tablet Take 1 Tab by mouth every eight (8) hours as needed for Pain. Max Daily Amount: 3 Tabs.  predniSONE (DELTASONE) 20 mg tablet Take 1 Tab by mouth daily.  ergocalciferol (VITAMIN D2) 50,000 unit capsule Take 1 Cap by mouth every seven (7) days.  furosemide (LASIX) 20 mg tablet One tablet daily    potassium chloride (K-DUR, KLOR-CON) 10 mEq tablet Take 1 Tab by mouth daily.  cyclobenzaprine (FLEXERIL) 10 mg tablet Take 1 Tab by mouth three (3) times daily as needed for Muscle Spasm(s).  lidocaine-EPINEPHrine (XYLOCAINE) 1 %-1:100,000 injection 10 mL by IntraDERMal route once for 1 dose. Indications: ADMINISTRATION OF LOCAL ANESTHESIA     No current facility-administered medications for this visit. Allergies   Allergen Reactions    Advair Diskus [Fluticasone-Salmeterol] Other (comments)     Hoarseness    Naproxen Unable to Obtain    Pcn [Penicillins] Rash     Past Surgical History:   Procedure Laterality Date    CARDIAC SURG PROCEDURE UNLIST  11/07    thallium negative    CT HEAD W CONT  11/07    negative    EGD      PUD on EGD, H pylori +txed 2009.       Brandy Nino 2009 adenoma; Dr Kelvin Pacheco 9/12  adenoma and tics; 12/26/17 sessile serrated and tubular adenomas and tics    HX HEENT thyroid US negative 6/14    HX HYSTERECTOMY  1993    for fibroids    HX LOBECTOMY      for cancer, Dr. Cathey Bernheim 10/06. No adjuvant tx. She has another spot on the same side that is being followed every three months.  HX ORTHOPAEDIC      Left foot ([place]) and knee surgery x2 Dr. Robby Turcios.  US DUPLEX LOWER EXT VEIN BILAT W MANEUVERS  6/11    negative     Social History     Occupational History    [employer]       Social History Main Topics    Smoking status: Former Smoker     Packs/day: 0.50     Years: 20.00     Types: Cigarettes     Quit date: 1/1/1990    Smokeless tobacco: Never Used    Alcohol use No    Drug use: No    Sexual activity: Not on file     Family History   Problem Relation Age of Onset    Breast Cancer Mother     Other Brother      SLE    Other Brother      SLE    Other Brother      SLE    Hypertension Father        REVIEW OF SYSTEMS : 4/17/2018  ALL BELOW ARE Negative except : SEE HPI       Constitutional: Negative for fever, chills and weight loss. Neg Weigh Loss  Cardiovascular: Negative for chest pain, claudication and leg swelling. SOB, RAHMAN   Gastrointestinal: Negative for  pain, N/V/D/C, Blood in stool or urine,dysuria, hematuria,        Incontinence, pelvic pain  Musculoskeletal: see HPI. Neurological: Negative for dizziness and weakness. Negative for headaches,Visual Changes, Confusion, Seizures,   Psychiatric/Behavioral: Negative for depression, memory loss and substance abuse. Extremities:  Negative for  hair changes, rash or skin lesion changes. Hematologic: Negative for Bleeding problems, bruising, pallor or swollen lymph nodes.   Peripheral Vascular: No calf pain, vascular vein tenderness to calf pain              No calf throbbing, posterior knee throbbing pain    DIAGNOSTIC IMAGING     No notes on file    Written by Suzanne García, as dictated by Froylan Duke MD. IDr., Froylan Duke MD, confirm that all documentation is accurate.

## 2018-04-17 NOTE — LETTER
NOTIFICATION RETURN TO WORK / SCHOOL 
 
4/17/2018 11:54 AM 
 
Ms. Sonya Allen [address] 
[employer] [place] [address] To Whom It May Concern: 
 
Sonya Miller is currently under the care of [address] [place], [place] [address]. Please allow Ms. Alycia Holloway to return to work on 4/23/2018. She is under our care and will be evaluated after diagnostic tests are performed. If there are questions or concerns please have the patient contact our office.  
 
 
 
Sincerely, 
 
 
Alana Wetzel MD

## 2018-04-17 NOTE — PATIENT INSTRUCTIONS
Please follow up after MRI. You are advised to contact us if your condition worsens. An MRI or CT has been ordered for you. A [facility] will be contacting you to schedule the appointment as soon as it has been approved with your insurance company. Please schedule an appointment to follow up with the doctor or the physicians assistant after the MRI or CT has been conducted. Foot Arthritis: Exercises  Your Care Instructions  Here are some examples of exercises for foot arthritis. Start each exercise slowly. Ease off the exercise if you start to have pain. Your doctor or physical therapist will tell you when you can start these exercises and which ones will work best for you. How to do the exercises  Calf stretch (knees straight)    1. Place a book on the floor a few inches from a wall or countertop, and put the balls of your feet on it. Your heels should be on the floor. The book needs to be thick enough so that you can feel a gentle stretch in your calf. If you are not steady on your feet, hold on to a chair, counter, or wall while you do this stretch. 2. Keep your knees straight, and lean forward until you feel a stretch in your calf. 3. To get more stretch, add another book or use a thicker book, such as a phone book, a dictionary, or an encyclopedia. 4. Hold the stretch for at least 15 to 30 seconds. 5. Repeat 2 to 4 times. Calf stretch (knees bent)    1. Place a book on the floor a few inches from a wall or countertop, and put the balls of your feet on it. Your heels should be on the floor. The book needs to be thick enough so that you can feel a gentle stretch in your calf. If you are not steady on your feet, hold on to a chair, counter, or wall while you do this stretch. 2. Bend your knees, and lean forward until you feel a stretch in your calf. 3. To get more stretch, add another book or use a thicker book, such as a phone book, a dictionary, or an encyclopedia.   4. Hold the stretch for at least 15 to 30 seconds. 5. Repeat 2 to 4 times. Great toe extension stretch    1. Sit in a chair, and put your affected foot across your other knee. 2. Grasp your heel with one hand and then slowly pull your big toe back with your other hand. Pull your toe back toward your ankle until you feel a stretch along the bottom of your foot. 3. Hold the stretch for at least 15 to 30 seconds. 4. Repeat 2 to 4 times. 5. Switch feet and repeat steps 1 through 4, even if only one foot is sore. Great toe flexion stretch    1. Sit in a chair, and put your affected foot across your other knee. 2. Grasp your heel with one hand and then slowly push your big toe down with your other hand. Push your toe down and away from your ankle until you feel a stretch along the top of your foot. 3. Hold the stretch for at least 15 to 30 seconds. 4. Repeat 2 to 4 times. 5. Switch feet and repeat steps 1 through 4, even if only one foot is sore. Ankle alphabet    1. Sit in a chair with your feet flat on the floor. (You can also do this exercise lying on your back with your affected leg propped up on a pillow). 2. Lift the heel of your sore foot off the floor, and slowly trace the letters of the alphabet. 3. Switch feet and repeat steps 1 through 2, even if only one foot is sore. Resisted ankle dorsiflexion    1. Tie the ends of an exercise band together to form a loop. Attach one end of the loop to a secure object or shut a door on it to hold it in place. (Or you can have someone hold one end of the loop to provide resistance.)  2. While sitting on the floor or in a chair, loop the other end of the band over the top of your affected foot. 3. Keeping your knee and leg straight, slowly flex your foot to pull back on the exercise band, and then slowly relax. 4. Repeat 8 to 12 times. 5. Switch feet and repeat steps 1 through 4, even if only one foot is sore. Towel curl    1.  While sitting, place your affected foot on a towel on the floor, and scrunch the towel toward you with your toes. 2. Then use your toes to push the towel away from you. 3. Repeat 8 to 12 times. 4. Switch feet and repeat steps 1 through 3, even if only one foot is sore. Resisted ankle eversion    1. Sit on the floor with your legs straight. 2. Hold both ends of an exercise band and loop the band around the outside of your affected foot. Then press your other foot against the band. 3. Keeping your leg straight, slowly push your affected foot outward against the band and away from your other foot without letting your leg rotate. Then slowly relax. 4. Repeat 8 to 12 times. 5. Switch feet and repeat steps 1 through 4, even if only one foot is sore. Resisted ankle inversion    1. Sit on the floor with your good leg crossed over your other leg. 2. Hold both ends of an exercise band and loop the band around the inside of your affected foot. Then press your other foot against the band. 3. Keeping your legs crossed, slowly push your affected foot against the band so that foot moves away from your other foot. Then slowly relax. 4. Repeat 8 to 12 times. 5. Switch feet and repeat steps 1 through 4, even if only one foot is sore. Follow-up care is a key part of your treatment and safety. Be sure to make and go to all appointments, and call your doctor if you are having problems. It's also a good idea to know your test results and keep a list of the medicines you take. Where can you learn more? Go to http://nilo-cindy.info/. Enter K113 in the search box to learn more about \"Foot Arthritis: Exercises. \"  Current as of: March 21, 2017  Content Version: 11.4  © 0515-0818 [employer]. Care instructions adapted under license by [employer] (which disclaims liability or warranty for this information).  If you have questions about a medical condition or this instruction, always ask your healthcare professional. [employer] disclaims any warranty or liability for your use of this information.

## 2018-04-17 NOTE — LETTER
NOTIFICATION RETURN TO WORK / SCHOOL 
 
4/17/2018 11:36 AM 
 
Ms. Rox Allen [address] 
[employer] [place] [address] To Whom It May Concern: 
 
Rox Aguilar is currently under the care of [address] [place] [address] Guy Gonzalez. Please allow her to remain out of work until further notice. If there are questions or concerns please have the patient contact our office.  
 
 
 
Sincerely, 
 
 
Enoch Curtis MD

## 2018-04-17 NOTE — MR AVS SNAPSHOT
[address], [address] 706 [facility] 
[phone] Patient: Ericka Arias MRN: [id number]:[id number] Visit Information Date & Time Provider Department Dept. Phone Encounter #  
 4/17/2018 10:15 AM Antwan Fleming MD [facility] [phone] [id number] Your Appointments 5/14/2018  1:45 PM  
Office Visit with Bianca Cheema MD  
[facility] [address] [place] [address]) Appt Note: 6 month f/u  
 [address] [facility], [address] [place] [address]  
  
   
 [address], [address] Upcoming Health Maintenance Date Due Hepatitis C Screening 1955 BREAST CANCER SCRN MAMMOGRAM 11/13/2019 DTaP/Tdap/Td series (2 - Td) 8/31/2026 COLONOSCOPY 12/26/2027 Allergies as of 4/17/2018  Review Complete On: 4/13/2018 By: Bianca Cheema MD  
  
 Severity Noted Reaction Type Reactions Advair Diskus [Fluticasone-salmeterol]   Intolerance Other (comments) Hoarseness Naproxen    Unable to Obtain Pcn [Penicillins]    Rash Current Immunizations  Reviewed on 8/28/2012 Name Date  
 TD Vaccine 1/1/1990 Not reviewed this visit You Were Diagnosed With   
  
 Codes Comments Right foot pain    -  Primary ICD-10-CM: X29.056 ICD-9-CM: 729.5 Arthritis, midfoot     ICD-10-CM: M19.079 ICD-9-CM: 716.97 Vitals BP Pulse Temp Resp Height(growth percentile) Weight(growth percentile) 148/87 77 97.4 °F (36.3 °C) (Oral) 16 5' 3\" (1.6 m) 215 lb 6.4 oz (97.7 kg) SpO2 BMI OB Status Smoking Status 98% 38.16 kg/m2 Hysterectomy Former Smoker BMI and BSA Data Body Mass Index Body Surface Area  
 38.16 kg/m 2 2.08 m 2 Preferred Pharmacy Pharmacy Name Phone [address] [facility], [address] [facility] [phone] Your Updated Medication List  
  
   
This list is accurate as of 4/17/18 11:45 AM.  Always use your most recent med list.  
  
  
  
  
 albuterol 90 mcg/actuation inhaler Commonly known as:  PROVENTIL HFA, VENTOLIN HFA, PROAIR HFA Take 2 Puffs by inhalation every four (4) hours as needed. beclomethasone 80 mcg/actuation [employer] Commonly known as:  QVAR Take 1 Puff by inhalation two (2) times a day. cyclobenzaprine 10 mg tablet Commonly known as:  FLEXERIL Take 1 Tab by mouth three (3) times daily as needed for Muscle Spasm(s). ergocalciferol 50,000 unit capsule Commonly known as:  VITAMIN D2 Take 1 Cap by mouth every seven (7) days. furosemide 20 mg tablet Commonly known as:  LASIX One tablet daily HYDROcodone-acetaminophen 5-325 mg per tablet Commonly known as:  Hurman Grist Take 1 Tab by mouth every eight (8) hours as needed for Pain. Max Daily Amount: 3 Tabs. indomethacin 50 mg capsule Commonly known as:  INDOCIN Take  by mouth three (3) times daily. lidocaine-EPINEPHrine 1 %-1:100,000 injection Commonly known as:  XYLOCAINE 10 mL by IntraDERMal route once for 1 dose. Indications: ADMINISTRATION OF LOCAL ANESTHESIA  
  
 magnesium oxide 400 mg tablet Commonly known as:  MAG-OX Take 400 mg by mouth daily. multivitamin tablet Commonly known as:  ONE A DAY Take 1 Tab by mouth daily. omeprazole 40 mg capsule Commonly known as:  PRILOSEC Take 1 Cap by mouth daily. potassium chloride 10 mEq tablet Commonly known as:  KLOR-CON Take 1 Tab by mouth daily. predniSONE 20 mg tablet Commonly known as:  Marv Abbie Take 1 Tab by mouth daily. VITAMIN C 250 mg tablet Generic drug:  ascorbic acid (vitamin C) Take  by mouth. To-Do List   
 04/17/2018 Imaging:  MRI FOOT RT WO CONT Referral Information Referral ID Referred By Referred To  
  
 [id number] Gael Dickinson Not Available Visits Status Start Date End Date 1 New Request 4/17/18 4/17/19 If your referral has a status of pending review or denied, additional information will be sent to support the outcome of this decision. Patient Instructions Please follow up after MRI. You are advised to contact us if your condition worsens. An MRI or CT has been ordered for you. A [facility] will be contacting you to schedule the appointment as soon as it has been approved with your insurance company. Please schedule an appointment to follow up with the doctor or the physicians assistant after the MRI or CT has been conducted. Foot Arthritis: Exercises Your Care Instructions Here are some examples of exercises for foot arthritis. Start each exercise slowly. Ease off the exercise if you start to have pain. Your doctor or physical therapist will tell you when you can start these exercises and which ones will work best for you. How to do the exercises Calf stretch (knees straight) 1. Place a book on the floor a few inches from a wall or countertop, and put the balls of your feet on it. Your heels should be on the floor. The book needs to be thick enough so that you can feel a gentle stretch in your calf. If you are not steady on your feet, hold on to a chair, counter, or wall while you do this stretch. 2. Keep your knees straight, and lean forward until you feel a stretch in your calf. 3. To get more stretch, add another book or use a thicker book, such as a phone book, a dictionary, or an encyclopedia. 4. Hold the stretch for at least 15 to 30 seconds. 5. Repeat 2 to 4 times. Calf stretch (knees bent) 1. Place a book on the floor a few inches from a wall or countertop, and put the balls of your feet on it. Your heels should be on the floor. The book needs to be thick enough so that you can feel a gentle stretch in your calf.  If you are not steady on your feet, hold on to a chair, counter, or wall while you do this stretch. 2. Bend your knees, and lean forward until you feel a stretch in your calf. 3. To get more stretch, add another book or use a thicker book, such as a phone book, a dictionary, or an encyclopedia. 4. Hold the stretch for at least 15 to 30 seconds. 5. Repeat 2 to 4 times. Great toe extension stretch 1. Sit in a chair, and put your affected foot across your other knee. 2. Grasp your heel with one hand and then slowly pull your big toe back with your other hand. Pull your toe back toward your ankle until you feel a stretch along the bottom of your foot. 3. Hold the stretch for at least 15 to 30 seconds. 4. Repeat 2 to 4 times. 5. Switch feet and repeat steps 1 through 4, even if only one foot is sore. Great toe flexion stretch 1. Sit in a chair, and put your affected foot across your other knee. 2. Grasp your heel with one hand and then slowly push your big toe down with your other hand. Push your toe down and away from your ankle until you feel a stretch along the top of your foot. 3. Hold the stretch for at least 15 to 30 seconds. 4. Repeat 2 to 4 times. 5. Switch feet and repeat steps 1 through 4, even if only one foot is sore. Ankle alphabet 1. Sit in a chair with your feet flat on the floor. (You can also do this exercise lying on your back with your affected leg propped up on a pillow). 2. Lift the heel of your sore foot off the floor, and slowly trace the letters of the alphabet. 3. Switch feet and repeat steps 1 through 2, even if only one foot is sore. Resisted ankle dorsiflexion 1. Tie the ends of an exercise band together to form a loop. Attach one end of the loop to a secure object or shut a door on it to hold it in place. (Or you can have someone hold one end of the loop to provide resistance.) 2. While sitting on the floor or in a chair, loop the other end of the band over the top of your affected foot. 3. Keeping your knee and leg straight, slowly flex your foot to pull back on the exercise band, and then slowly relax. 4. Repeat 8 to 12 times. 5. Switch feet and repeat steps 1 through 4, even if only one foot is sore. Towel curl 1. While sitting, place your affected foot on a towel on the floor, and scrunch the towel toward you with your toes. 2. Then use your toes to push the towel away from you. 3. Repeat 8 to 12 times. 4. Switch feet and repeat steps 1 through 3, even if only one foot is sore. Resisted ankle eversion 1. Sit on the floor with your legs straight. 2. Hold both ends of an exercise band and loop the band around the outside of your affected foot. Then press your other foot against the band. 3. Keeping your leg straight, slowly push your affected foot outward against the band and away from your other foot without letting your leg rotate. Then slowly relax. 4. Repeat 8 to 12 times. 5. Switch feet and repeat steps 1 through 4, even if only one foot is sore. Resisted ankle inversion 1. Sit on the floor with your good leg crossed over your other leg. 2. Hold both ends of an exercise band and loop the band around the inside of your affected foot. Then press your other foot against the band. 3. Keeping your legs crossed, slowly push your affected foot against the band so that foot moves away from your other foot. Then slowly relax. 4. Repeat 8 to 12 times. 5. Switch feet and repeat steps 1 through 4, even if only one foot is sore. Follow-up care is a key part of your treatment and safety. Be sure to make and go to all appointments, and call your doctor if you are having problems. It's also a good idea to know your test results and keep a list of the medicines you take. Where can you learn more? Go to http://nilo-cindy.info/. Enter K113 in the search box to learn more about \"Foot Arthritis: Exercises. \" Current as of: March 21, 2017 Content Version: 11.4 © 4128-4975 Healthwise, Incorporated. Care instructions adapted under license by [employer] (which disclaims liability or warranty for this information). If you have questions about a medical condition or this instruction, always ask your healthcare professional. [address] any warranty or liability for your use of this information. Introducing [facility]! Rohan Azevedo introduces Logi-Serve patient portal. Now you can access parts of your medical record, email your doctor's office, and request medication refills online. 1. In your internet browser, go to [email or web address] 2. Click on the First Time User? Click Here link in the Sign In box. You will see the New Member Sign Up page. 3. Enter your Logi-Serve Access Code exactly as it appears below. You will not need to use this code after youve completed the sign-up process. If you do not sign up before the expiration date, you must request a new code. · Logi-Serve Access Code: [email or web address] Expires: 7/12/2018 12:11 PM 
 
4. Enter the last four digits of your Social Security Number (xxxx) and Date of Birth (mm/dd/yyyy) as indicated and click Submit. You will be taken to the next sign-up page. 5. Create a Logi-Serve ID. This will be your Logi-Serve login ID and cannot be changed, so think of one that is secure and easy to remember. 6. Create a Logi-Serve password. You can change your password at any time. 7. Enter your Password Reset Question and Answer. This can be used at a later time if you forget your password. 8. Enter your e-mail address. You will receive e-mail notification when new information is available in [address] 9. Click Sign Up. You can now view and download portions of your medical record. 10. Click the Download Summary menu link to download a portable copy of your medical information.  
 
If you have questions, please visit the Frequently Asked Questions section of the [employer]. Remember, [employer] is NOT to be used for urgent needs. For medical emergencies, dial 911. Now available from your iPhone and Android! Please provide this summary of care documentation to your next provider. Your primary care clinician is listed as Valerio Mcarthur. If you have any questions after today's visit, please call [phone].

## 2018-04-19 ENCOUNTER — TELEPHONE (OUTPATIENT)
Dept: INTERNAL MEDICINE CLINIC | Age: 63
End: 2018-04-19

## 2018-04-24 ENCOUNTER — TELEPHONE (OUTPATIENT)
Dept: INTERNAL MEDICINE CLINIC | Age: 63
End: 2018-04-24

## 2018-04-24 NOTE — TELEPHONE ENCOUNTER
Results for orders placed or performed in visit on 04/13/18   CBC W/O DIFF   Result Value Ref Range    WBC 6.5 3.4 - 10.8 x10E3/uL    RBC 3.83 3.77 - 5.28 x10E6/uL    HGB 11.6 11.1 - 15.9 g/dL    HCT 35.0 34.0 - 46.6 %    MCV 91 79 - 97 fL    MCH 30.3 26.6 - 33.0 pg    MCHC 33.1 31.5 - 35.7 g/dL    RDW 13.3 12.3 - 15.4 %    PLATELET 771 608 - 991 [id number]   METABOLIC PANEL, BASIC   Result Value Ref Range    Glucose 100 (H) 65 - 99 mg/dL    BUN 12 8 - 27 mg/dL    Creatinine 0.57 0.57 - 1.00 mg/dL    GFR est non-AA 99 >59 mL/min/1.73    GFR est  >59 mL/min/1.73    BUN/Creatinine ratio 21 12 - 28    Sodium 142 134 - 144 mmol/L    Potassium 4.4 3.5 - 5.2 mmol/L    Chloride 103 96 - 106 mmol/L    CO2 26 18 - 29 mmol/L    Calcium 9.0 8.7 - 10.3 mg/dL   NAFISA QL, W/REFLEX CASCADE   Result Value Ref Range    NAFISA Direct Negative Negative    See below Comment    URIC ACID   Result Value Ref Range    Uric acid 6.0 2.5 - 7.1 mg/dL   SED RATE (ESR)   Result Value Ref Range    Sed rate (ESR) 19 0 - 40 mm/hr     All labs neg  Suggest following through w dr Erika Valentine' suggestions

## 2018-04-25 NOTE — TELEPHONE ENCOUNTER
Patient returned call. I advised her that all of her labs are normal but she wants a nurse to call her to go over them with her.

## 2018-04-26 ENCOUNTER — HOSPITAL ENCOUNTER (OUTPATIENT)
Age: 63
Discharge: HOME | End: 2018-04-26
Attending: ORTHOPAEDIC SURGERY
Payer: COMMERCIAL

## 2018-04-26 DIAGNOSIS — M79.671 RIGHT FOOT PAIN: ICD-10-CM

## 2018-04-26 DIAGNOSIS — M19.071 PRIMARY OSTEOARTHRITIS OF RIGHT FOOT: ICD-10-CM

## 2018-04-26 PROCEDURE — 73718 MRI LOWER EXTREMITY W/O DYE: CPT

## 2018-04-30 ENCOUNTER — OFFICE VISIT (OUTPATIENT)
Dept: ORTHOPEDIC SURGERY | Age: 63
End: 2018-04-30

## 2018-04-30 VITALS
WEIGHT: 217 LBS | OXYGEN SATURATION: 97 % | DIASTOLIC BLOOD PRESSURE: 65 MMHG | HEART RATE: 84 BPM | BODY MASS INDEX: 38.45 KG/M2 | RESPIRATION RATE: 20 BRPM | HEIGHT: 63 IN | SYSTOLIC BLOOD PRESSURE: 141 MMHG

## 2018-04-30 DIAGNOSIS — S92.324A CLOSED NONDISPLACED FRACTURE OF SECOND METATARSAL BONE OF RIGHT FOOT, INITIAL ENCOUNTER: Primary | ICD-10-CM

## 2018-04-30 RX ORDER — HYDROCODONE BITARTRATE AND ACETAMINOPHEN 5; 325 MG/1; MG/1
1-2 TABLET ORAL
Qty: 40 TAB | Refills: 0 | Status: SHIPPED | OUTPATIENT
Start: 2018-04-30 | End: 2018-07-18

## 2018-04-30 NOTE — PROGRESS NOTES
AMBULATORY PROGRESS NOTE      Patient: Belen Dozier             MRN: [id number]     SSN: [id number] Body mass index is 38.44 kg/(m^2). YOB: 1955     AGE: 60 y.o. EX: female    PCP: Joselin Rivera MD    IMPRESSION/DIAGNOSIS AND TREATMENT PLAN     DIAGNOSES  1. Closed nondisplaced fracture of second metatarsal bone of right foot, initial encounter        Orders Placed This Encounter    CLOSED RX METATARSAL FX    AMB SUPPLY ORDER    HYDROcodone-acetaminophen (NORCO) 5-325 mg per tablet     Fx coded charged  2018       Belen Dozier understands her diagnoses and the proposed plan. MRI confirms a fracture to the second metatarsal.   The results are well-delineated in the diagnostic section of this report. Recommendation is listed as below. Plan:    1) DMV Temporary Placard  2) Work Note: Please allow her to return to work if there is sedentary work. If not, please allow her to remain out of work until further notice. 3) DME Order: Right Short CAM walker boot. 4) Norco 5 m-2 PO BIDPRN; 40 tablets, 0 refills. RTO - 4 weeks    HPI AND EXAMINATION     Belen Dozier IS A 60 y.o. female who presents to my outpatient office for follow up of midfoot arthritis, right foot pain, and swelling of the right foot. At last visit, I prescribed Tramadol 50 mg, provided a work note, and ordered a MRI of the right foot. Since we saw her last, Ms. Demar Velazquez reports that she continues to experience pain. She rates her pain at 9-10/10 today. CT scan results have been reviewed with the patient showing a \"Second mid metatarsal oblique fracture with intense bone marrow edema and surrounding soft tissue edema with some evidence of healing, highly suggestive of a stress fracture versus subacute healing traumatic fracture. \" She has been working at [email or web address], she notes she is able to find sedentary work there.     Right ANKLE and FOOT        Gait: slow  Tenderness: moderate tenderness across 2nd metatarsal heads. Cutaneous: No rashes, skin patches, wounds, or abrasions to the lower legs                                Warm and Normal color. No regions of expressible drainage. Medial Border of Tibia Region: absent                                Skin color, texture, turgor normal. Normal.                                Mild swelling from forefoot to midfoot. Joint Motion: ROM Ankle:Normal , Hindfoot: (ST,TN,CC Normal}, Forefoot toes:Decreased  Neurologic Exam: Neuro: Motor: normal 5/5 strength in all tested muscle groups and Sensory : no sensory deficits noted. Contractures: Gastrocnemius or Achilles Contractures absent  Joint / Tendon Stability: No Ankle or Subtalar instability or joint laxity. No peroneal sublux ability or dislocation  Alignment: Slight Pes Planus  Vascular: Normal Pulses/ NL Capillary refill, No evidence of DVT seen on physical exam.                        No calf swelling, no tenderness to calf muscles. Lymphatic:  No Evidence of Lymphedema. CHART REVIEW     Past Medical History:   Diagnosis Date    Asthma     Dr. Chawla Done [facility])     Lung Cancer, f/u Dr. Amy Walker.  Chronic obstructive pulmonary disease (HCC)     and right lung nodules, stable on serial CT last 6/14    Colon adenoma     2009 Dr. Veda Troy; adenoma and diverticulosis 9/12 Dr. Rafael Iverson; 12/26/17    FHx: breast cancer     Glaucoma     Dr Kevin Travis Hypovitaminosis D 2011    IFG (impaired fasting glucose)     Morbid obesity (HCC)     peak weight 209 lbs, bmi 37 from 2/14    PUD (peptic ulcer disease)     h pylori tx'ed    Sleep apnea     on CPAP, Dr. Lamont Michaud 2010    Venous insufficiency      Current Outpatient Prescriptions   Medication Sig    HYDROcodone-acetaminophen (NORCO) 5-325 mg per tablet Take 1-2 Tabs by mouth two (2) times daily as needed for Pain.  Max Daily Amount: 4 Tabs.  traMADol (ULTRAM) 50 mg tablet Take 1-2 Tabs by mouth two (2) times daily as needed for Pain. Max Daily Amount: 200 mg. **DO NOT TAKE WITH FLEXERIL**    indomethacin (INDOCIN) 50 mg capsule Take  by mouth three (3) times daily.  HYDROcodone-acetaminophen (NORCO) 5-325 mg per tablet Take 1 Tab by mouth every eight (8) hours as needed for Pain. Max Daily Amount: 3 Tabs.  omeprazole (PRILOSEC) 40 mg capsule Take 1 Cap by mouth daily.  ergocalciferol (VITAMIN D2) 50,000 unit capsule Take 1 Cap by mouth every seven (7) days.  furosemide (LASIX) 20 mg tablet One tablet daily    potassium chloride (K-DUR, KLOR-CON) 10 mEq tablet Take 1 Tab by mouth daily.  cyclobenzaprine (FLEXERIL) 10 mg tablet Take 1 Tab by mouth three (3) times daily as needed for Muscle Spasm(s).  magnesium oxide (MAG-OX) 400 mg tablet Take 400 mg by mouth daily.  ascorbic acid (VITAMIN C) 250 mg tablet Take  by mouth.  beclomethasone (QVAR) 80 mcg/actuation inhaler Take 1 Puff by inhalation two (2) times a day.  albuterol (PROVENTIL HFA, VENTOLIN HFA) 90 mcg/actuation inhaler Take 2 Puffs by inhalation every four (4) hours as needed.  multivitamin (ONE A DAY) tablet Take 1 Tab by mouth daily.  predniSONE (DELTASONE) 20 mg tablet Take 1 Tab by mouth daily.  lidocaine-EPINEPHrine (XYLOCAINE) 1 %-1:100,000 injection 10 mL by IntraDERMal route once for 1 dose. Indications: ADMINISTRATION OF LOCAL ANESTHESIA     No current facility-administered medications for this visit. Allergies   Allergen Reactions    Advair Diskus [Fluticasone-Salmeterol] Other (comments)     Hoarseness    Naproxen Unable to Obtain    Pcn [Penicillins] Rash     Past Surgical History:   Procedure Laterality Date    CARDIAC SURG PROCEDURE UNLIST  11/07    thallium negative    CT HEAD W CONT  11/07    negative    EGD      PUD on EGD, H pylori +txed 2009.      Kwaku Parcel    HX CATARACT REMOVAL      Dr Trey Giron  HX COLONOSCOPY      Dr. Emiliano Capone 2009 adenoma; Dr Autumn Andujar 9/12  adenoma and tics; 12/26/17 sessile serrated and tubular adenomas and tics    HX HEENT      thyroid US negative 6/14    HX HYSTERECTOMY  1993    for fibroids    HX LOBECTOMY      for cancer, Dr. Jacob Sen 10/06. No adjuvant tx. She has another spot on the same side that is being followed every three months.  HX ORTHOPAEDIC      Left foot ([place]ucitz) and knee surgery x2 Dr. Arie Wiggins.  US DUPLEX LOWER EXT VEIN BILAT W MANEUVERS  6/11    negative     Social History     Occupational History    [employer]       Social History Main Topics    Smoking status: Former Smoker     Packs/day: 0.50     Years: 20.00     Types: Cigarettes     Quit date: 1/1/1990    Smokeless tobacco: Never Used    Alcohol use No    Drug use: No    Sexual activity: Not on file     Family History   Problem Relation Age of Onset    Breast Cancer Mother     Other Brother      SLE    Other Brother      SLE    Other Brother      SLE    Hypertension Father        REVIEW OF SYSTEMS : 4/30/2018  ALL BELOW ARE Negative except : SEE HPI       Constitutional: Negative for fever, chills and weight loss. Neg Weigh Loss  Cardiovascular: Negative for chest pain, claudication and leg swelling. SOB, RAHMAN   Gastrointestinal: Negative for  pain, N/V/D/C, Blood in stool or urine,dysuria, hematuria,        Incontinence, pelvic pain  Musculoskeletal: see HPI. Neurological: Negative for dizziness and weakness. Negative for headaches,Visual Changes, Confusion, Seizures,   Psychiatric/Behavioral: Negative for depression, memory loss and substance abuse. Extremities:  Negative for  hair changes, rash or skin lesion changes. Hematologic: Negative for Bleeding problems, bruising, pallor or swollen lymph nodes.   Peripheral Vascular: No calf pain, vascular vein tenderness to calf pain              No calf throbbing, posterior knee throbbing pain    DIAGNOSTIC IMAGING No notes on file    MRI Results (most recent):    Results from [facility] encounter on 04/26/18   MRI FOOT RT WO CONT   Narrative Procedure: MRI of the right forefoot without contrast.    Indications: Persistent right foot pain and swelling, injury end of 2017    Comparisons: None    Technique: Triplanar T1 without fat-sat and T2 weighted with fat-sat MRI images  of the forefoot were obtained. Findings:    Osseous structures and joints:  Intense bone marrow edema involving the second metatarsal with evidence of  nondisplaced fracture. There is associated periosteal reaction and cortical  thickening. There is adjacent significant soft tissue edema. Remainder of the  osseous structures intact. Mild-to-moderate degenerative changes at the first MTP joint. Partial-thickness  chondral loss and subchondral cyst at the first MTP joint. No joint effusions. Additional degenerative changes and mild edema signal between the medial and  middle cuneiform. Muscles and tendons:  Grossly intact plantar and dorsal tendineus structures. Forefoot muscular edema  centered at the second metatarsal.    Ligaments:  Tarsal plate is intact. The distal aspect of the plantar fascia is intact. Lisfranc ligament is intact. Others: Moderate to severe dorsal foot subcutaneous soft tissue edema         Impression IMPRESSION:    1. Second mid metatarsal oblique fracture with intense bone marrow edema and  surrounding soft tissue edema with some evidence of healing, highly suggestive  of a stress fracture versus subacute healing traumatic fracture. 2. Mild to moderate first MTP osteoarthrosis. Written by Juan Weathers, as dictated by Claritza Gutierrez MD. IDr., Claritza Gutierrez MD, confirm that all documentation is accurate.

## 2018-04-30 NOTE — LETTER
NOTIFICATION RETURN TO WORK / SCHOOL 
 
4/30/2018 3:16 PM 
 
Ms. Sangeeta [address] 
[employer] [place] [address] To Whom It May Concern: 
 
Sangeeta Krishnamurthy is currently under the care of [address] [place] [address] Guy Gonzalez. Please allow her to remain out of work until further notice. If there are questions or concerns please have the patient contact our office.  
 
 
 
Sincerely, 
 
 
Alexis Aden MD

## 2018-04-30 NOTE — PROGRESS NOTES
Chief Complaint   Patient presents with    Foot Pain     right    Follow-up     MRI     Pain 9-10/10

## 2018-04-30 NOTE — LETTER
NOTIFICATION RETURN TO WORK / SCHOOL 
 
5/23/2018 1:25 PM 
 
Ms. Sonya [address] 
[employer] [place] [address] To Whom It May Concern: 
 
Sonya Miller is currently under the care of [address] [place] [address] Guy Gonzalez. Her foot pain may be caused by her initial injury at work. If there are questions or concerns please have the patient contact our office.  
 
 
 
Sincerely, 
 
 
Alana Wetzel MD

## 2018-04-30 NOTE — LETTER
NOTIFICATION RETURN TO WORK / SCHOOL 
 
4/30/2018 3:15 PM 
 
Ms. Sergio [address] 
[employer] [address] To Whom It May Concern: 
 
Sergio St is currently under the care of [address] [place] [address] Guy Gonzalez. Please allow her to return to work if there is sedentary work. If not, please allow her to remain out of work until further notice. If there are questions or concerns please have the patient contact our office.  
 
 
 
Sincerely, 
 
 
Raul Reynolds MD

## 2018-04-30 NOTE — MR AVS SNAPSHOT
[address], [address] 200 [facility] 
[phone] Patient: Pancho Harrell MRN: [id number] CMC:7/1/4175 Visit Information Date & Time Provider Department Dept. Phone Encounter #  
 4/30/2018  1:40 PM Lady Tiffanie MD [facility] [phone] Your Appointments 5/14/2018  1:45 PM  
Office Visit with Letty Simpson MD  
[facility] Appt Note: 6 month f/u  
 [address] [facility], [address] [address] [address] 455 [facility]  
  
   
 [address], [address] Upcoming Health Maintenance Date Due Hepatitis C Screening 1955 Influenza Age 5 to Adult 8/1/2018 BREAST CANCER SCRN MAMMOGRAM 11/13/2019 DTaP/Tdap/Td series (2 - Td) 8/31/2026 COLONOSCOPY 12/26/2027 Allergies as of 4/30/2018  Review Complete On: 4/30/2018 By: Sharyn Mendiola LPN Severity Noted Reaction Type Reactions Advair Diskus [Fluticasone-salmeterol]   Intolerance Other (comments) Hoarseness Naproxen    Unable to Obtain Pcn [Penicillins]    Rash Current Immunizations  Reviewed on 8/28/2012 Name Date  
 TD Vaccine 1/1/1990 Not reviewed this visit Vitals BP Pulse Resp Height(growth percentile) Weight(growth percentile) SpO2  
 141/65 (BP 1 Location: Left arm, BP Patient Position: Sitting) 84 20 5' 3\" (1.6 m) 217 lb (98.4 kg) 97% BMI OB Status Smoking Status 38.44 kg/m2 Hysterectomy Former Smoker BMI and BSA Data Body Mass Index Body Surface Area  
 38.44 kg/m 2 2.09 m 2 Preferred Pharmacy Pharmacy Name Phone [address] [place] [address], [facility] [phone] Your Updated Medication List  
  
   
This list is accurate as of 4/30/18  3:17 PM.  Always use your most recent med list.  
  
  
 albuterol 90 mcg/actuation inhaler Commonly known as:  PROVENTIL HFA, VENTOLIN HFA, PROAIR HFA Take 2 Puffs by inhalation every four (4) hours as needed. beclomethasone 80 mcg/actuation [employer] Commonly known as:  QVAR Take 1 Puff by inhalation two (2) times a day. cyclobenzaprine 10 mg tablet Commonly known as:  FLEXERIL Take 1 Tab by mouth three (3) times daily as needed for Muscle Spasm(s). ergocalciferol 50,000 unit capsule Commonly known as:  VITAMIN D2 Take 1 Cap by mouth every seven (7) days. furosemide 20 mg tablet Commonly known as:  LASIX One tablet daily HYDROcodone-acetaminophen 5-325 mg per tablet Commonly known as:  Hurman Grist Take 1 Tab by mouth every eight (8) hours as needed for Pain. Max Daily Amount: 3 Tabs. indomethacin 50 mg capsule Commonly known as:  INDOCIN Take  by mouth three (3) times daily. lidocaine-EPINEPHrine 1 %-1:100,000 injection Commonly known as:  XYLOCAINE 10 mL by IntraDERMal route once for 1 dose. Indications: ADMINISTRATION OF LOCAL ANESTHESIA  
  
 magnesium oxide 400 mg tablet Commonly known as:  MAG-OX Take 400 mg by mouth daily. multivitamin tablet Commonly known as:  ONE A DAY Take 1 Tab by mouth daily. omeprazole 40 mg capsule Commonly known as:  PRILOSEC Take 1 Cap by mouth daily. potassium chloride 10 mEq tablet Commonly known as:  KLOR-CON Take 1 Tab by mouth daily. predniSONE 20 mg tablet Commonly known as:  Marv Abbie Take 1 Tab by mouth daily. traMADol 50 mg tablet Commonly known as:  ULTRAM  
Take 1-2 Tabs by mouth two (2) times daily as needed for Pain. Max Daily Amount: 200 mg. **DO NOT TAKE WITH FLEXERIL**  
  
 VITAMIN C 250 mg tablet Generic drug:  ascorbic acid (vitamin C) Take  by mouth. Introducing [facility]!    
 Jose Paniagua introduces en-Gauge patient portal. Now you can access parts of your medical record, email your doctor's office, and request medication refills online. 1. In your internet browser, go to [email or web address] 2. Click on the First Time User? Click Here link in the Sign In box. You will see the New Member Sign Up page. 3. Enter your [employer] Access Code exactly as it appears below. You will not need to use this code after youve completed the sign-up process. If you do not sign up before the expiration date, you must request a new code. · [employer] Access Code: [email or web address] Expires: 7/12/2018 12:11 PM 
 
4. Enter the last four digits of your Social Security Number (xxxx) and Date of Birth (mm/dd/yyyy) as indicated and click Submit. You will be taken to the next sign-up page. 5. Create a [employer] ID. This will be your [employer] login ID and cannot be changed, so think of one that is secure and easy to remember. 6. Create a [employer] password. You can change your password at any time. 7. Enter your Password Reset Question and Answer. This can be used at a later time if you forget your password. 8. Enter your e-mail address. You will receive e-mail notification when new information is available in [address] 9. Click Sign Up. You can now view and download portions of your medical record. 10. Click the Download Summary menu link to download a portable copy of your medical information. If you have questions, please visit the Frequently Asked Questions section of the [employer] website. Remember, [employer] is NOT to be used for urgent needs. For medical emergencies, dial 911. Now available from your iPhone and Android! Please provide this summary of care documentation to your next provider. Your primary care clinician is listed as Devika Palomino. If you have any questions after today's visit, please call [phone].

## 2018-05-01 ENCOUNTER — TELEPHONE (OUTPATIENT)
Dept: ORTHOPEDIC SURGERY | Age: 63
End: 2018-05-01

## 2018-05-01 NOTE — TELEPHONE ENCOUNTER
PATIENT CALLED AND SAID SHE NEEDS A NOTE FROM DR. HERNÁNDEZ STATING EXACTLY WHAT IS WRONG WITH HER. PATIENT SAID SHE NEEDS THE NOTE FOR WORK AS SOON AS POSSIBLE. PATIENT WILL  FROM [place] LOC[facility]. PATIENT TEL. [phone]. NOTE: PATIENT LAST SAW  ON 04/30/18 FOR HER RT FOOT.

## 2018-05-01 NOTE — TELEPHONE ENCOUNTER
Spoke with patient and informed her that I will leave a copy of her most recent office note at the  (4-30-18)  Patient states she will pick notes up later today 5-1-13.

## 2018-05-02 ENCOUNTER — TELEPHONE (OUTPATIENT)
Dept: ORTHOPEDIC SURGERY | Age: 63
End: 2018-05-02

## 2018-05-02 NOTE — TELEPHONE ENCOUNTER
Patient called stating her job will not let her return to work until Dr. Jolene Valencia writes a letter stating what her exact restrictions are. She is asking if she has to pick the letter up from the office or if it can be faxed to her at [phone]. Please advise patient regarding this at [phone].

## 2018-05-02 NOTE — LETTER
NOTIFICATION RETURN TO WORK / SCHOOL 
 
5/2/2018 2:19 PM 
 
Ms. [address]omy [address] 
[address] To Whom It May Concern: 
 
Romy Johnson is currently under the care of [address] [place] [address] [place]. No weight bearing on right lower extremity, and no driving, crawling, climbing, kneeling, stooping, squatting. Please provide patient with sedentary work if available. If there are questions or concerns please have the patient contact our office.  
 
 
 
Sincerely, 
 
 
Marta Osullivan MD

## 2018-05-02 NOTE — TELEPHONE ENCOUNTER
Please provide work note with work restrictions as follows: No weight bearing on right lower extremity, and no driving, crawling, climbing, kneeling, stooping, squatting. Please provide patient with sedentary work if available.      Sallie Pulido PA-C  5/2/2018   10:07 AM

## 2018-05-11 NOTE — PROGRESS NOTES
58 y.o. BLACK OR  female who presents for f/u    She was referred to Dr Beatris Reeves after our last encounter for the right foot pain and was subsequently dx'ed w 2nd met fx. They are treating conservatively    No more cp episodes. She saw Dr Hazel Mcknight for 2nd opinion and echo neg, she says she had holter but unable to find on CC. No gi or gu sc currently    The resp sx are stable on inhalers below    Denies polyuria, polydipsia, nocturia, vision change. Not checking sugars at this time. She has not been able to lose weight    Vitals 5/14/2018 4/30/2018 4/17/2018 4/13/2018 11/29/2017   Weight 213 lb 217 lb 215 lb 6.4 oz 219 lb 218 lb 6.4 oz     She apparently has not had f/u since Dr Jcarlos Arguelles left, Dr Alma Rosa Mcgraw was only seeing her for the asthma    Past Medical History:   Diagnosis Date    Asthma     Dr. Aquino fl[facility])     Lung Cancer s/p right lobectomy Dr Rudolph Ovalle 10/06    Chronic obstructive pulmonary disease ([address]     and right lung nodules, stable on serial CT last 6/14    Colon adenoma     2009 Dr. Bibiana Bullock; adenoma and diverticulosis 9/12 Dr. Jim Barrett; 12/26/17    FHx: breast cancer     Glaucoma     Dr Wilbert Hernandez Hyperlipidemia 5/14/2018    Hypovitaminosis D 2011    IFG (impaired fasting glucose)     Morbid obesity (HCC)     peak weight 209 lbs, bmi 37 from 2/14    PUD (peptic ulcer disease)     h pylori tx'ed    Sleep apnea     on CPAP, Dr. Tana Muse 2010    Venous insufficiency      Past Surgical History:   Procedure Laterality Date    CARDIAC SURG PROCEDURE UNLIST  11/07    thallium negative    CARDIAC SURG PROCEDURE UNLIST  12/2017    echo showed nl lv, ef 65%, gr 1 dd; Dr Margo Bass  09/2016    ex stress echo without clear wma Dr Driss Baker  11/07    negative    EGD      PUD on EGD, H pylori +txed 2009.  HX APPENDECTOMY  1980s    HX CATARACT REMOVAL      Dr Vernon Buitrago 2009 adenoma;   Niki 9/12  adenoma and tics; 12/26/17 sessile serrated and tubular adenomas and tics    HX HEENT      thyroid US negative 6/14    HX HYSTERECTOMY  1993    for fibroids    HX LOBECTOMY      for cancer, Dr. Ria Lauren 10/06. No adjuvant tx. She has another spot on the same side that is being followed every three months.  HX ORTHOPAEDIC      Left foot ([place]) and knee surgery x2 Dr. Kaylen Holman. Right foot fx 4/18 Dr Gladis Pimentel [address]T [address]  6/11    negative     Social History     Social History    Marital status: SINGLE     Spouse name: N/A    Number of children: 0    Years of education: N/A     Occupational History    [place]       Social History Main Topics    Smoking status: Former Smoker     Packs/day: 0.50     Years: 20.00     Types: Cigarettes     Quit date: 1/1/1990    Smokeless tobacco: Never Used    Alcohol use No    Drug use: No    Sexual activity: Not Currently     Other Topics Concern    Not on file     Social History Narrative     Family History   Problem Relation Age of Onset    Breast Cancer Mother     Other Brother      SLE    Other Brother      SLE    Other Brother      SLE    Hypertension Father      Immunization History   Administered Date(s) Administered    TD Vaccine 01/01/1990     Current Outpatient Prescriptions   Medication Sig    ergocalciferol (VITAMIN D2) 50,000 unit capsule Take 1 Cap by mouth every seven (7) days.  potassium chloride (K-DUR, KLOR-CON) 10 mEq tablet Take 1 Tab by mouth daily.  magnesium oxide (MAG-OX) 400 mg tablet Take 400 mg by mouth daily.  ascorbic acid (VITAMIN C) 250 mg tablet Take  by mouth.  beclomethasone (QVAR) 80 mcg/actuation inhaler Take 1 Puff by inhalation two (2) times a day.  albuterol (PROVENTIL HFA, VENTOLIN HFA) 90 mcg/actuation inhaler Take 2 Puffs by inhalation every four (4) hours as needed.  multivitamin (ONE A DAY) tablet Take 1 Tab by mouth daily.     HYDROcodone-acetaminophen (NORCO) 5-325 mg per tablet Take 1-2 Tabs by mouth two (2) times daily as needed for Pain. Max Daily Amount: 4 Tabs.  traMADol (ULTRAM) 50 mg tablet Take 1-2 Tabs by mouth two (2) times daily as needed for Pain. Max Daily Amount: 200 mg. **DO NOT TAKE WITH FLEXERIL**    indomethacin (INDOCIN) 50 mg capsule Take  by mouth three (3) times daily.  HYDROcodone-acetaminophen (NORCO) 5-325 mg per tablet Take 1 Tab by mouth every eight (8) hours as needed for Pain. Max Daily Amount: 3 Tabs.  predniSONE (DELTASONE) 20 mg tablet Take 1 Tab by mouth daily.  omeprazole (PRILOSEC) 40 mg capsule Take 1 Cap by mouth daily.  furosemide (LASIX) 20 mg tablet One tablet daily    cyclobenzaprine (FLEXERIL) 10 mg tablet Take 1 Tab by mouth three (3) times daily as needed for Muscle Spasm(s).  lidocaine-EPINEPHrine (XYLOCAINE) 1 %-1:100,000 injection 10 mL by IntraDERMal route once for 1 dose. Indications: ADMINISTRATION OF LOCAL ANESTHESIA     No current facility-administered medications for this visit.       Allergies   Allergen Reactions    Advair Diskus [Fluticasone-Salmeterol] Other (comments)     Hoarseness    Naproxen Unable to Obtain    Pcn [Penicillins] Rash     REVIEW OF SYSTEMS: mammo 7/15, hyst, colo Dr Teo Hirsch 9/12  Ophtho  no vision change or eye pain  Oral  no mouth pain, tongue or tooth problems  Ears  no hearing loss, ear pain, fullness, no swallowing problems  Cardiac  no CP, PND, orthopnea, palpitations or syncope  Chest  no breast masses  Resp  no wheezing, chronic coughing, dyspnea  GI  no heartburn, nausea, vomiting, change in bowel habits, bleeding, hemorrhoids  Urinary  no dysuria, hematuria, flank pain, urgency, frequency  Genitals  no genital lesions, discharge, masses, ulceration, warts  Ortho  no swelling, dec ROM, myalgias  Endo - no polyuria, polydipsia, nocturia, hot flashes    Visit Vitals    /62 (BP 1 Location: Left arm, BP Patient Position: Sitting)    Pulse 85    Temp 99 °F (37.2 °C) (Oral)    Resp 16    Ht 5' 3\" (1.6 m)    Wt 213 lb (96.6 kg)    SpO2 97%    BMI 37.73 kg/m2     A&O x3  Affect is appropriate. Mood stable  No apparent distress  Anicteric, no JVD, adenopathy or thyromegaly. No carotid bruits or radiated murmur  Lungs clear to auscultation, no wheezes or rales  Heart showed regular rate and rhythm. No murmur, rubs, gallops  Abdomen soft nontender, no hepatosplenomegaly or masses.    Extremities with trace pedal edema, right foot booted    LABS  From 8/12 showed   gluc 96,   cr 0.63, gfr 115, alt 10,           chol 197, tg 77, hdl 82, ldl-c 100, wbc 5.4, hb 12.6, plt 242, tsh 0.81, ua neg  From 3/14 showed   gluc 105, cr 0.58, gfr 101,     hba1c 5.8  From 6/15 showed   gluc 97,   cr 0.73, gfr>60,  alt<5,  hba1c 5.6, chol 192, tg 56, hdl 73, ldl-c 108, wbc 5.4, hb 12.3, plt 249, tsh 0.96, vit d 12.7, hiv neg  From 11/15 showed                            ddimer neg  From 5/16 showed   gluc 99,   cr 0.62, gfr 112,          wbc 5.9, hb 12.5, plt 258, tsh 1.04, ua neg,     proBNP 29  From 10/17 showed gluc 100, cr 0.71, gfr 91,   alt 16, hba1c 5.2, chol 212, tg 53, hdl 92, ldl-c 109, wbc 5.4, hb 12.9, plt 253,             vit d 12.8    Results for orders placed or performed in visit on 04/13/18   CBC W/O DIFF   Result Value Ref Range    WBC 6.5 3.4 - 10.8 x10E3/uL    RBC 3.83 3.77 - 5.28 x10E6/uL    HGB 11.6 11.1 - 15.9 g/dL    HCT 35.0 34.0 - 46.6 %    MCV 91 79 - 97 fL    MCH 30.3 26.6 - 33.0 pg    MCHC 33.1 31.5 - 35.7 g/dL    RDW 13.3 12.3 - 15.4 %    PLATELET 714 832 - 416 C73X6/RK   METABOLIC PANEL, BASIC   Result Value Ref Range    Glucose 100 (H) 65 - 99 mg/dL    BUN 12 8 - 27 mg/dL    Creatinine 0.57 0.57 - 1.00 mg/dL    GFR est non-AA 99 >59 mL/min/1.73    GFR est  >59 mL/min/1.73    BUN/Creatinine ratio 21 12 - 28    Sodium 142 134 - 144 mmol/L    Potassium 4.4 3.5 - 5.2 mmol/L    Chloride 103 96 - 106 mmol/L    CO2 26 18 - 29 mmol/L    Calcium 9.0 8.7 - 10.3 mg/dL   NAFISA QL, W/REFLEX CASCADE   Result Value Ref Range    NAFISA Direct Negative Negative    See below Comment    URIC ACID   Result Value Ref Range    Uric acid 6.0 2.5 - 7.1 mg/dL   SED RATE (ESR)   Result Value Ref Range    Sed rate (ESR) 19 0 - 40 mm/hr     Patient Active Problem List   Diagnosis Code    Colon adenoma and diverticulosis Dr. Maria Morin 2012 D12.6    Sleep apnea Dr. Willard Kerns 2010 G47.30    Venous insufficiency I87.2    Hypovitaminosis D E55.9    Hx of cancer of lung 2006 s/p resection Z85.118    IFG (impaired fasting glucose) R73.01    COPD (chronic obstructive pulmonary disease) (HCC) J44.9    Pulmonary nodules R91.8    Hyperlipidemia E78.5    Severe obesity (BMI 35.0-39. 9) with comorbidity ([facility] [address] E66.01     Assessment and plan:  1. Morbid obesity. Previously declined eagle supps, medically supervised wt loss program, bariatrics. Lifestyle and dietary measures, portion control  2. Colon adenoma and diverticulosis. Fiber, colo 2022  3. MIMI on cpap. F/U Dr. Willard Kerns  4. Hypovit d. Supplementation   5. H/O lung ca and lung nodules. CT to be scheduled  6. IFG. Wt loss would be ideal.  Lifestyle and dietary measures  7. Right foot fx. Per Dr Mayra Albarran        RTC 11/18    Above conditions discussed at length and patient vocalized understanding.   All questions answered to patient satisfaction

## 2018-05-14 ENCOUNTER — TELEPHONE (OUTPATIENT)
Dept: INTERNAL MEDICINE CLINIC | Age: 63
End: 2018-05-14

## 2018-05-14 ENCOUNTER — OFFICE VISIT (OUTPATIENT)
Dept: INTERNAL MEDICINE CLINIC | Age: 63
End: 2018-05-14

## 2018-05-14 VITALS
OXYGEN SATURATION: 97 % | HEART RATE: 85 BPM | TEMPERATURE: 99 F | RESPIRATION RATE: 16 BRPM | BODY MASS INDEX: 37.74 KG/M2 | HEIGHT: 63 IN | WEIGHT: 213 LBS | DIASTOLIC BLOOD PRESSURE: 62 MMHG | SYSTOLIC BLOOD PRESSURE: 108 MMHG

## 2018-05-14 DIAGNOSIS — J44.9 CHRONIC OBSTRUCTIVE PULMONARY DISEASE, UNSPECIFIED COPD TYPE (HCC): ICD-10-CM

## 2018-05-14 DIAGNOSIS — E55.9 HYPOVITAMINOSIS D: ICD-10-CM

## 2018-05-14 DIAGNOSIS — G47.33 OBSTRUCTIVE SLEEP APNEA SYNDROME: ICD-10-CM

## 2018-05-14 DIAGNOSIS — Z85.118 HX OF CANCER OF LUNG: Primary | ICD-10-CM

## 2018-05-14 DIAGNOSIS — R73.01 IFG (IMPAIRED FASTING GLUCOSE): ICD-10-CM

## 2018-05-14 DIAGNOSIS — R91.8 PULMONARY NODULES: ICD-10-CM

## 2018-05-14 DIAGNOSIS — D12.6 COLON ADENOMA: ICD-10-CM

## 2018-05-14 DIAGNOSIS — E66.9 OBESITY (BMI 30-39.9): ICD-10-CM

## 2018-05-14 DIAGNOSIS — E78.5 HYPERLIPIDEMIA, UNSPECIFIED HYPERLIPIDEMIA TYPE: ICD-10-CM

## 2018-05-14 PROBLEM — E66.01 SEVERE OBESITY (BMI 35.0-39.9) WITH COMORBIDITY (HCC): Status: ACTIVE | Noted: 2018-05-14

## 2018-05-14 NOTE — LETTER
NOTIFICATION RETURN TO WORK / SCHOOL 
 
5/14/2018 2:24 PM 
 
Ms. Natalia [address] 
[address] To Whom It May Concern: 
 
Natalia Hsieh is currently under the care of [facility]. She will return to work/school on: 5/14/2018. If there are questions or concerns please have the patient contact our office. Sincerely, Remington Sotelo MD

## 2018-05-14 NOTE — TELEPHONE ENCOUNTER
At check out patient wanted to remind RD he needs to place order for CT of chest that they discussed during visit.

## 2018-05-14 NOTE — PATIENT INSTRUCTIONS
Advance Care Planning: Care Instructions  Your Care Instructions    It can be hard to live with an illness that cannot be cured. But if your health is getting worse, you may want to make decisions about end-of-life care. Planning for the end of your life does not mean that you are giving up. It is a way to make sure that your wishes are met. Clearly stating your wishes can make it easier for your loved ones. Making plans while you are still able may also ease your mind and make your final days less stressful and more meaningful. Follow-up care is a key part of your treatment and safety. Be sure to make and go to all appointments, and call your doctor if you are having problems. It's also a good idea to know your test results and keep a list of the medicines you take. What can you do to plan for the end of life? · You can bring these issues up with your doctor. You do not need to wait until your doctor starts the conversation. You might start with \"I would not be willing to live with . Malika Zarate \" When you complete this sentence it helps your doctor understand your wishes. · Talk openly and honestly with your doctor. This is the best way to understand the decisions you will need to make as your health changes. Know that you can always change your mind. · Ask your doctor about commonly used life-support measures. These include tube feedings, breathing machines, and fluids given through a vein (IV). Understanding these treatments will help you decide whether you want them. · You may choose to have these life-supporting treatments for a limited time. This allows a trial period to see whether they will help you. You may also decide that you want your doctor to take only certain measures to keep you alive. It is important to spell out these conditions so that your doctor and family understand them. · Talk to your doctor about how long you are likely to live.  He or she may be able to give you an idea of what usually happens with your specific illness. · Think about preparing papers that state your wishes. This way there will not be any confusion about what you want. You can change your instructions at any time. Which papers should you prepare? Advance directives are legal papers that tell doctors how you want to be cared for at the end of your life. You do not need a  to write these papers. Ask your doctor or your state health department for information on how to write your advance directives. They may have the forms for each of these types of papers. Make sure your doctor has a copy of these on file, and give a copy to a family member or close friend. · Consider a do-not-resuscitate order (DNR). This order asks that no extra treatments be done if your heart stops or you stop breathing. Extra treatments may include cardiopulmonary resuscitation (CPR), electrical shock to restart your heart, or a machine to breathe for you. If you decide to have a DNR order, ask your doctor to explain and write it. Place the order in your home where everyone can easily see it. · Consider a living will. A living will explains your wishes about life support and other treatments at the end of your life if you become unable to speak for yourself. Living ruiz tell doctors to use or not use treatments that would keep you alive. You must have one or two witnesses or a notary present when you sign this form. · Consider a durable power of  for health care. This allows you to name a person to make decisions about your care if you are not able to. Most people ask a close friend or family member. Talk to this person about the kinds of treatments you want and those that you do not want. Make sure this person understands your wishes. These legal papers are simple to change. Tell your doctor what you want to change, and ask him or her to make a note in your medical file. Give your family updated copies of the papers.   Where can you learn more?  Go to http://nilo-cindy.info/. Enter P184 in the search box to learn more about \"Advance Care Planning: Care Instructions. \"  Current as of: September 24, 2016  Content Version: 11.4  © 9767-3832 [employer]. Care instructions adapted under license by [employer] (which disclaims liability or warranty for this information). If you have questions about a medical condition or this instruction, always ask your healthcare professional. [address] any warranty or liability for your use of this information. Advance Directives: Care Instructions  Your Care Instructions  An advance directive is a legal way to state your wishes at the end of your life. It tells your family and your doctor what to do if you can no longer say what you want. There are two main types of advance directives. You can change them any time that your wishes change. · A living will tells your family and your doctor your wishes about life support and other treatment. · A durable power of  for health care lets you name a person to make treatment decisions for you when you can't speak for yourself. This person is called a health care agent. If you do not have an advance directive, decisions about your medical care may be made by a doctor or a  who doesn't know you. It may help to think of an advance directive as a gift to the people who care for you. If you have one, they won't have to make tough decisions by themselves. Follow-up care is a key part of your treatment and safety. Be sure to make and go to all appointments, and call your doctor if you are having problems. It's also a good idea to know your test results and keep a list of the medicines you take. How can you care for yourself at home? · Discuss your wishes with your loved ones and your doctor. This way, there are no surprises. · Many states have a unique form.  Or you might use a universal form that has been approved by many states. This kind of form can sometimes be completed and stored online. Your electronic copy will then be available wherever you have a connection to the Internet. In most cases, doctors will respect your wishes even if you have a form from a different state. · You don't need a  to do an advance directive. But you may want to get legal advice. · Think about these questions when you prepare an advance directive:  ¨ Who do you want to make decisions about your medical care if you are not able to? Many people choose a family member or close friend. ¨ Do you know enough about life support methods that might be used? If not, talk to your doctor so you understand. ¨ What are you most afraid of that might happen? You might be afraid of having pain, losing your independence, or being kept alive by machines. ¨ Where would you prefer to die? Choices include your home, a hospital, or a nursing home. ¨ Would you like to have information about hospice care to support you and your family? ¨ Do you want to donate organs when you die? ¨ Do you want certain [employer] practices performed before you die? If so, put your wishes in the advance directive. · Read your advance directive every year, and make changes as needed. When should you call for help? Be sure to contact your doctor if you have any questions. Where can you learn more? Go to http://nilo-cindy.info/. Enter R264 in the search box to learn more about \"Advance Directives: Care Instructions. \"  Current as of: September 24, 2016  Content Version: 11.4  © 1584-2663 [employer]. Care instructions adapted under license by [employer] (which disclaims liability or warranty for this information).  If you have questions about a medical condition or this instruction, always ask your healthcare professional. [address] any warranty or liability for your use of this information. Learning About Kip Meehan  What is a living will? A living will is a legal form you use to write down the kind of care you want at the end of your life. It is used by the health professionals who will treat you if you aren't able to decide for yourself. If you put your wishes in writing, your loved ones and others will know what kind of care you want. They won't need to guess. This can ease your mind and be helpful to others. A living will is not the same as an estate or property will. An estate will explains what you want to happen with your money and property after you die. Is a living will a legal document? A living will is a legal document. Each state has its own laws about living ruiz. If you move to another state, make sure that your living will is legal in the state where you now live. Or you might use a universal form that has been approved by many states. This kind of form can sometimes be completed and stored online. Your electronic copy will then be available wherever you have a connection to the Internet. In most cases, doctors will respect your wishes even if you have a form from a different state. · You don't need an  to complete a living will. But legal advice can be helpful if your state's laws are unclear, your health history is complicated, or your family can't agree on what should be in your living will. · You can change your living will at any time. Some people find that their wishes about end-of-life care change as their health changes. · In addition to making a living will, think about completing a medical power of  form. This form lets you name the person you want to make end-of-life treatment decisions for you (your \"health care agent\") if you're not able to. Many hospitals and nursing homes will give you the forms you need to complete a living will and a medical power of .   · Your living will is used only if you can't make or communicate decisions for yourself anymore. If you become able to make decisions again, you can accept or refuse any treatment, no matter what you wrote in your living will. · Your state may offer an online registry. This is a place where you can store your living will online so the doctors and nurses who need to treat you can find it right away. What should you think about when creating a living will? Talk about your end-of-life wishes with your family members and your doctor. Let them know what you want. That way the people making decisions for you won't be surprised by your choices. Think about these questions as you make your living will:  · Do you know enough about life support methods that might be used? If not, talk to your doctor so you know what might be done if you can't breathe on your own, your heart stops, or you're unable to swallow. · What things would you still want to be able to do after you receive life-support methods? Would you want to be able to walk? To speak? To eat on your own? To live without the help of machines? · If you have a choice, where do you want to be cared for? In your home? At a hospital or nursing home? · Do you want certain [employer] practices performed if you become very ill? · If you have a choice at the end of your life, where would you prefer to die? At home? In a hospital or nursing home? Somewhere else? · Would you prefer to be buried or cremated? · Do you want your organs to be donated after you die? What should you do with your living will? · Make sure that your family members and your health care agent have copies of your living will. · Give your doctor a copy of your living will to keep in your medical record. If you have more than one doctor, make sure that each one has a copy. · You may want to put a copy of your living will where it can be easily found. Where can you learn more? Go to http://nilo-cindy.info/.   Enter D066 in the search box to learn more about \"Learning About Living Perroy. \"  Current as of: September 24, 2016  Content Version: 11.4  © 1213-9865 [employer]. Care instructions adapted under license by [employer] (which disclaims liability or warranty for this information). If you have questions about a medical condition or this instruction, always ask your healthcare professional. [place] [address] any warranty or liability for your use of this information.

## 2018-05-14 NOTE — PROGRESS NOTES
Chief Complaint   Patient presents with    Obesity     6 month follow up with lab results. Health Maintenance Due   Topic Date Due    Hepatitis C Screening  1955     1. Have you been to the ER, urgent care clinic or hospitalized since your last visit? YES. Dr. Shana Okeefe, orthopedic, [facility], Last seen last month for right broke foot. 2. Have you seen or consulted any other health care providers outside of the [address] [place] [address] since your last visit (Include any pap smears or colon screening)? NO      Do you have an Advanced Directive?  NO

## 2018-05-14 NOTE — MR AVS SNAPSHOT
Lorenza Moraes 
 
 
 [address] [facility], [address] [facility] [address] 
[phone] Patient: Twan Padilla MRN: [id number] XUV:[birth year] Visit Information Date & Time Provider Department Dept. Phone Encounter #  
 5/14/2018  1:45 PM Torres Vazquez MD [facility] [phone] [id number] Your Appointments 5/29/2018  1:40 PM  
Follow Up with Estrella Elena MD  
[address] [place] [address] (Lucero Trevino) Appt Note: rt ft 4 wk fu  
 [address] [facility], [place] [address] [address] [facility] [address]  
[phone] [address] [place] [address] [address]  
  
    
 11/20/2018  2:00 PM  
PHYSICAL with Torres Vazquez MD  
[facility] Trevino) Appt Note: pe rd labs at lab [address] [place] [address] [place]  
  
   
 [address], [address] Upcoming Health Maintenance Date Due Hepatitis C Screening 1955 Influenza Age 5 to Adult 8/1/2018 BREAST CANCER SCRN MAMMOGRAM 11/13/2019 DTaP/Tdap/Td series (2 - Td) 8/31/2026 COLONOSCOPY 12/26/2027 Allergies as of 5/14/2018  Review Complete On: 5/14/2018 By: Elsy Martínze Severity Noted Reaction Type Reactions Advair Diskus [Fluticasone-salmeterol]   Intolerance Other (comments) Hoarseness Naproxen    Unable to Obtain Pcn [Penicillins]    Rash Current Immunizations  Reviewed on 8/28/2012 Name Date  
 TD Vaccine 1/1/1990 Not reviewed this visit Vitals BP Pulse Temp Resp Height(growth percentile) Weight(growth percentile) 108/62 (BP 1 Location: Left arm, BP Patient Position: Sitting) 85 99 °F (37.2 °C) (Oral) 16 5' 3\" (1.6 m) 213 lb (96.6 kg) SpO2 BMI OB Status Smoking Status 97% 37.73 kg/m2 Hysterectomy Former Smoker Vitals History BMI and BSA Data Body Mass Index Body Surface Area  
 37.73 kg/m 2 2.07 m 2 Preferred Pharmacy Pharmacy Name Phone [address] [place] [address], [address] [place] [phone] Your Updated Medication List  
  
   
This list is accurate as of 5/14/18  2:22 PM.  Always use your most recent med list.  
  
  
  
  
 albuterol 90 mcg/actuation inhaler Commonly known as:  PROVENTIL HFA, VENTOLIN HFA, PROAIR HFA Take 2 Puffs by inhalation every four (4) hours as needed. beclomethasone 80 mcg/actuation [employer] Commonly known as:  QVAR Take 1 Puff by inhalation two (2) times a day. cyclobenzaprine 10 mg tablet Commonly known as:  FLEXERIL Take 1 Tab by mouth three (3) times daily as needed for Muscle Spasm(s). ergocalciferol 50,000 unit capsule Commonly known as:  VITAMIN D2 Take 1 Cap by mouth every seven (7) days. furosemide 20 mg tablet Commonly known as:  LASIX One tablet daily * HYDROcodone-acetaminophen 5-325 mg per tablet Commonly known as:  Iris Keith Take 1 Tab by mouth every eight (8) hours as needed for Pain. Max Daily Amount: 3 Tabs. * HYDROcodone-acetaminophen 5-325 mg per tablet Commonly known as:  Iris Keith Take 1-2 Tabs by mouth two (2) times daily as needed for Pain. Max Daily Amount: 4 Tabs. indomethacin 50 mg capsule Commonly known as:  INDOCIN Take  by mouth three (3) times daily. lidocaine-EPINEPHrine 1 %-1:100,000 injection Commonly known as:  XYLOCAINE 10 mL by IntraDERMal route once for 1 dose. Indications: ADMINISTRATION OF LOCAL ANESTHESIA  
  
 magnesium oxide 400 mg tablet Commonly known as:  MAG-OX Take 400 mg by mouth daily. multivitamin tablet Commonly known as:  ONE A DAY Take 1 Tab by mouth daily. omeprazole 40 mg capsule Commonly known as:  PRILOSEC Take 1 Cap by mouth daily. potassium chloride 10 mEq tablet Commonly known as:  KLOR-CON  
 Take 1 Tab by mouth daily. predniSONE 20 mg tablet Commonly known as:  Carlos Dallas Take 1 Tab by mouth daily. traMADol 50 mg tablet Commonly known as:  ULTRAM  
Take 1-2 Tabs by mouth two (2) times daily as needed for Pain. Max Daily Amount: 200 mg. **DO NOT TAKE WITH FLEXERIL**  
  
 VITAMIN C 250 mg tablet Generic drug:  ascorbic acid (vitamin C) Take  by mouth. * Notice: This list has 2 medication(s) that are the same as other medications prescribed for you. Read the directions carefully, and ask your doctor or other care provider to review them with you. Patient Instructions Advance Care Planning: Care Instructions Your Care Instructions It can be hard to live with an illness that cannot be cured. But if your health is getting worse, you may want to make decisions about end-of-life care. Planning for the end of your life does not mean that you are giving up. It is a way to make sure that your wishes are met. Clearly stating your wishes can make it easier for your loved ones. Making plans while you are still able may also ease your mind and make your final days less stressful and more meaningful. Follow-up care is a key part of your treatment and safety. Be sure to make and go to all appointments, and call your doctor if you are having problems. It's also a good idea to know your test results and keep a list of the medicines you take. What can you do to plan for the end of life? · You can bring these issues up with your doctor. You do not need to wait until your doctor starts the conversation. You might start with \"I would not be willing to live with . Beck Torres \" When you complete this sentence it helps your doctor understand your wishes. · Talk openly and honestly with your doctor. This is the best way to understand the decisions you will need to make as your health changes. Know that you can always change your mind. · Ask your doctor about commonly used life-support measures. These include tube feedings, breathing machines, and fluids given through a vein (IV). Understanding these treatments will help you decide whether you want them. · You may choose to have these life-supporting treatments for a limited time. This allows a trial period to see whether they will help you. You may also decide that you want your doctor to take only certain measures to keep you alive. It is important to spell out these conditions so that your doctor and family understand them. · Talk to your doctor about how long you are likely to live. He or she may be able to give you an idea of what usually happens with your specific illness. · Think about preparing papers that state your wishes. This way there will not be any confusion about what you want. You can change your instructions at any time. Which papers should you prepare? Advance directives are legal papers that tell doctors how you want to be cared for at the end of your life. You do not need a  to write these papers. Ask your doctor or your state [facility] department for information on how to write your advance directives. They may have the forms for each of these types of papers. Make sure your doctor has a copy of these on file, and give a copy to a family member or close friend. · Consider a do-not-resuscitate order (DNR). This order asks that no extra treatments be done if your heart stops or you stop breathing. Extra treatments may include cardiopulmonary resuscitation (CPR), electrical shock to restart your heart, or a machine to breathe for you. If you decide to have a DNR order, ask your doctor to explain and write it. Place the order in your home where everyone can easily see it. · Consider a living will.  A living will explains your wishes about life support and other treatments at the end of your life if you become unable to speak for yourself. Living ruiz tell doctors to use or not use treatments that would keep you alive. You must have one or two witnesses or a notary present when you sign this form. · Consider a durable power of  for health care. This allows you to name a person to make decisions about your care if you are not able to. Most people ask a close friend or family member. Talk to this person about the kinds of treatments you want and those that you do not want. Make sure this person understands your wishes. These legal papers are simple to change. Tell your doctor what you want to change, and ask him or her to make a note in your medical file. Give your family updated copies of the papers. Where can you learn more? Go to http://nilo-cindy.info/. Enter P184 in the search box to learn more about \"Advance Care Planning: Care Instructions. \" Current as of: September 24, 2016 Content Version: 11.4 © 6472-2335 WinFreeCandy. Care instructions adapted under license by [employer] (which disclaims liability or warranty for this information). If you have questions about a medical condition or this instruction, always ask your healthcare professional. [place] [address] any warranty or liability for your use of this information. Advance Directives: Care Instructions Your Care Instructions An advance directive is a legal way to state your wishes at the end of your life. It tells your family and your doctor what to do if you can no longer say what you want. There are two main types of advance directives. You can change them any time that your wishes change. · A living will tells your family and your doctor your wishes about life support and other treatment. · A durable power of  for health care lets you name a person to make treatment decisions for you when you can't speak for yourself. This person is called a health care agent. If you do not have an advance directive, decisions about your medical care may be made by a doctor or a  who doesn't know you. It may help to think of an advance directive as a gift to the people who care for you. If you have one, they won't have to make tough decisions by themselves. Follow-up care is a key part of your treatment and safety. Be sure to make and go to all appointments, and call your doctor if you are having problems. It's also a good idea to know your test results and keep a list of the medicines you take. How can you care for yourself at home? · Discuss your wishes with your loved ones and your doctor. This way, there are no surprises. · Many states have a unique form. Or you might use a universal form that has been approved by many states. This kind of form can sometimes be completed and stored online. Your electronic copy will then be available wherever you have a connection to the Internet. In most cases, doctors will respect your wishes even if you have a form from a different state. · You don't need a  to do an advance directive. But you may want to get legal advice. · Think about these questions when you prepare an advance directive: ¨ Who do you want to make decisions about your medical care if you are not able to? Many people choose a family member or close friend. ¨ Do you know enough about life support methods that might be used? If not, talk to your doctor so you understand. ¨ What are you most afraid of that might happen? You might be afraid of having pain, losing your independence, or being kept alive by machines. ¨ Where would you prefer to die? Choices include your home, a hospital, or a nursing home. ¨ Would you like to have information about hospice care to support you and your family? ¨ Do you want to donate organs when you die? ¨ Do you want certain Mosque practices performed before you die? If so, put your wishes in the advance directive. · Read your advance directive every year, and make changes as needed. When should you call for help? Be sure to contact your doctor if you have any questions. Where can you learn more? Go to http://nilo-cindy.info/. Enter R264 in the search box to learn more about \"Advance Directives: Care Instructions. \" Current as of: September 24, 2016 Content Version: 11.4 © 5139-3259 Dialoggy. Care instructions adapted under license by [employer] (which disclaims liability or warranty for this information). If you have questions about a medical condition or this instruction, always ask your healthcare professional. [place] [address] any warranty or liability for your use of this information. Miranda Cortes [address]23 What is a living will? A living will is a legal form you use to write down the kind of care you want at the end of your life. It is used by the health professionals who will treat you if you aren't able to decide for yourself. If you put your wishes in writing, your loved ones and others will know what kind of care you want. They won't need to guess. This can ease your mind and be helpful to others. A living will is not the same as an estate or property will. An estate will explains what you want to happen with your money and property after you die. Is a living will a legal document? A living will is a legal document. Each state has its own laws about living ruiz. If you move to another state, make sure that your living will is legal in the state where you now live. Or you might use a universal form that has been approved by many states. This kind of form can sometimes be completed and stored online. Your electronic copy will then be available wherever you have a connection to the Internet. In most cases, doctors will respect your wishes even if you have a form from a different state. · You don't need an  to complete a living will. But legal advice can be helpful if your state's laws are unclear, your health history is complicated, or your family can't agree on what should be in your living will. · You can change your living will at any time. Some people find that their wishes about end-of-life care change as their health changes. · In addition to making a living will, think about completing a medical power of  form. This form lets you name the person you want to make end-of-life treatment decisions for you (your \"health care agent\") if you're not able to. Many hospitals and nursing homes will give you the forms you need to complete a living will and a medical power of . · Your living will is used only if you can't make or communicate decisions for yourself anymore. If you become able to make decisions again, you can accept or refuse any treatment, no matter what you wrote in your living will. · Your state may offer an online registry. This is a place where you can store your living will online so the doctors and nurses who need to treat you can find it right away. What should you think about when creating a living will? Talk about your end-of-life wishes with your family members and your doctor. Let them know what you want. That way the people making decisions for you won't be surprised by your choices. Think about these questions as you make your living will: · Do you know enough about life support methods that might be used? If not, talk to your doctor so you know what might be done if you can't breathe on your own, your heart stops, or you're unable to swallow. · What things would you still want to be able to do after you receive life-support methods? Would you want to be able to walk? To speak? To eat on your own? To live without the help of machines? · If you have a choice, where do you want to be cared for? In your home? At a hospital or nursing home? · Do you want certain Faith practices performed if you become very ill? · If you have a choice at the end of your life, where would you prefer to die? At home? In a hospital or nursing home? Somewhere else? · Would you prefer to be buried or cremated? · Do you want your organs to be donated after you die? What should you do with your living will? · Make sure that your family members and your health care agent have copies of your living will. · Give your doctor a copy of your living will to keep in your medical record. If you have more than one doctor, make sure that each one has a copy. · You may want to put a copy of your living will where it can be easily found. Where can you learn more? Go to http://nilo-cindy.info/. Enter E612 in the search box to learn more about \"Learning About Living Perrobraulio. \" Current as of: September 24, 2016 Content Version: 11.4 © 1694-3279 UserZoom. Care instructions adapted under license by [employer] (which disclaims liability or warranty for this information). If you have questions about a medical condition or this instruction, always ask your healthcare professional. [address] any warranty or liability for your use of this information. Introducing [facility]! [facility] introduces RedCap patient portal. Now you can access parts of your medical record, email your doctor's office, and request medication refills online. 1. In your internet browser, go to [email or web address] 2. Click on the First Time User? Click Here link in the Sign In box. You will see the New Member Sign Up page. 3. Enter your RedCap Access Code exactly as it appears below. You will not need to use this code after youve completed the sign-up process. If you do not sign up before the expiration date, you must request a new code. · RedCap Access Code: [email or web address] Expires: 7/12/2018 12:11 PM 
 
4. Enter the last four digits of your Social Security Number (xxxx) and Date of Birth (mm/dd/yyyy) as indicated and click Submit. You will be taken to the next sign-up page. 5. Create a SECU4 ID. This will be your SECU4 login ID and cannot be changed, so think of one that is secure and easy to remember. 6. Create a SECU4 password. You can change your password at any time. 7. Enter your Password Reset Question and Answer. This can be used at a later time if you forget your password. 8. Enter your e-mail address. You will receive e-mail notification when new information is available in [address] 9. Click Sign Up. You can now view and download portions of your medical record. 10. Click the Download Summary menu link to download a portable copy of your medical information. If you have questions, please visit the Frequently Asked Questions section of the SECU4 website. Remember, SECU4 is NOT to be used for urgent needs. For medical emergencies, dial 911. Now available from your iPhone and Android! Please provide this summary of care documentation to your next provider. Your primary care clinician is listed as Ariel Rodriguez. If you have any questions after today's visit, please call [phone].

## 2018-05-15 ENCOUNTER — TELEPHONE (OUTPATIENT)
Dept: ORTHOPEDIC SURGERY | Age: 63
End: 2018-05-15

## 2018-05-15 NOTE — TELEPHONE ENCOUNTER
Pt in person today left medical referral form for signature and statement on letterhead confirming injury \"could have\" occurred at workplace. Also dropped off fmla forms.     Please fax to number on forms  then call pt to  originals at pt C#[phone] or T#[phone]

## 2018-05-24 ENCOUNTER — HOSPITAL ENCOUNTER (OUTPATIENT)
Dept: CT IMAGING | Age: 63
Discharge: HOME | End: 2018-05-24
Attending: INTERNAL MEDICINE
Payer: COMMERCIAL

## 2018-05-24 DIAGNOSIS — J44.9 CHRONIC OBSTRUCTIVE PULMONARY DISEASE, UNSPECIFIED COPD TYPE (HCC): ICD-10-CM

## 2018-05-24 DIAGNOSIS — R91.8 PULMONARY NODULES: ICD-10-CM

## 2018-05-24 DIAGNOSIS — Z85.118 HX OF CANCER OF LUNG: ICD-10-CM

## 2018-05-24 PROCEDURE — 71250 CT THORAX DX C-: CPT

## 2018-05-29 ENCOUNTER — OFFICE VISIT (OUTPATIENT)
Dept: ORTHOPEDIC SURGERY | Age: 63
End: 2018-05-29

## 2018-05-29 VITALS
HEART RATE: 83 BPM | TEMPERATURE: 97.6 F | HEIGHT: 63 IN | OXYGEN SATURATION: 99 % | BODY MASS INDEX: 37.92 KG/M2 | WEIGHT: 214 LBS | DIASTOLIC BLOOD PRESSURE: 60 MMHG | RESPIRATION RATE: 16 BRPM | SYSTOLIC BLOOD PRESSURE: 146 MMHG

## 2018-05-29 DIAGNOSIS — S92.901D CLOSED FRACTURE OF RIGHT FOOT WITH ROUTINE HEALING, SUBSEQUENT ENCOUNTER: Primary | ICD-10-CM

## 2018-05-29 NOTE — LETTER
NOTIFICATION RETURN TO WORK / SCHOOL 
 
5/29/2018 3:16 PM 
 
Ms. Rox [address] 
[address] 70 To Whom It May Concern: 
 
Rox Aguilar is currently under the care of [address] [place] [address] Guy Gonzalez. Please allow Ms. Glenis Kunz to remain out of work for the next 6-8 weeks. If there are questions or concerns please have the patient contact our office.  
 
 
 
Sincerely, 
 
 
Enoch Curtis MD

## 2018-05-29 NOTE — MR AVS SNAPSHOT
[place], [address] 200 [facility] 
[phone] Patient: Naz Dunaway MRN: [id number] SMP:3/8/1358 Visit Information Date & Time Provider Department Dept. Phone Encounter #  
 5/29/2018  1:40 PM Caprice Echavarria MD [facility] [phone] Your Appointments 11/20/2018  2:00 PM  
PHYSICAL with Radha Medina MD  
[facility] [address] [place] [address]) Appt Note: pe rd labs at lab [address], [address] [address] [address]  
  
   
 [address] [facility], [facility] Upcoming Health Maintenance Date Due Hepatitis C Screening 1955 Influenza Age 5 to Adult 8/1/2018 BREAST CANCER SCRN MAMMOGRAM 11/13/2019 COLONOSCOPY 12/26/2022 DTaP/Tdap/Td series (2 - Td) 8/31/2026 Allergies as of 5/29/2018  Review Complete On: 5/29/2018 By: Caprice Echavarria MD  
  
 Severity Noted Reaction Type Reactions Advair Diskus [Fluticasone-salmeterol]   Intolerance Other (comments) Hoarseness Naproxen    Unable to Obtain Pcn [Penicillins]    Rash Current Immunizations  Reviewed on 8/28/2012 Name Date  
 TD Vaccine 1/1/1990 Not reviewed this visit You Were Diagnosed With   
  
 Codes Comments Closed fracture of right foot with routine healing, subsequent encounter    -  Primary ICD-10-CM: S92.901D ICD-9-CM: V54.16 Vitals BP Pulse Temp Resp Height(growth percentile) Weight(growth percentile) 146/60 83 97.6 °F (36.4 °C) (Oral) 16 5' 3\" (1.6 m) 214 lb (97.1 kg) SpO2 BMI OB Status Smoking Status 99% 37.91 kg/m2 Hysterectomy Former Smoker BMI and BSA Data Body Mass Index Body Surface Area  
 37.91 kg/m 2 2.08 m 2 Preferred Pharmacy Pharmacy Name Phone [facility] [address] [place] [address], [address] [place] [address] [phone] Your Updated Medication List  
  
   
This list is accurate as of 5/29/18  3:18 PM.  Always use your most recent med list.  
  
  
  
  
 albuterol 90 mcg/actuation inhaler Commonly known as:  PROVENTIL HFA, VENTOLIN HFA, PROAIR HFA Take 2 Puffs by inhalation every four (4) hours as needed. beclomethasone 80 mcg/actuation [employer] Commonly known as:  QVAR Take 1 Puff by inhalation two (2) times a day. cyclobenzaprine 10 mg tablet Commonly known as:  FLEXERIL Take 1 Tab by mouth three (3) times daily as needed for Muscle Spasm(s). ergocalciferol 50,000 unit capsule Commonly known as:  VITAMIN D2 Take 1 Cap by mouth every seven (7) days. furosemide 20 mg tablet Commonly known as:  LASIX One tablet daily * HYDROcodone-acetaminophen 5-325 mg per tablet Commonly known as:  Makayla Plum Take 1 Tab by mouth every eight (8) hours as needed for Pain. Max Daily Amount: 3 Tabs. * HYDROcodone-acetaminophen 5-325 mg per tablet Commonly known as:  Makayla Plum Take 1-2 Tabs by mouth two (2) times daily as needed for Pain. Max Daily Amount: 4 Tabs. indomethacin 50 mg capsule Commonly known as:  INDOCIN Take  by mouth three (3) times daily. lidocaine-EPINEPHrine 1 %-1:100,000 injection Commonly known as:  XYLOCAINE 10 mL by IntraDERMal route once for 1 dose. Indications: ADMINISTRATION OF LOCAL ANESTHESIA  
  
 magnesium oxide 400 mg tablet Commonly known as:  MAG-OX Take 400 mg by mouth daily. multivitamin tablet Commonly known as:  ONE A DAY Take 1 Tab by mouth daily. omeprazole 40 mg capsule Commonly known as:  PRILOSEC Take 1 Cap by mouth daily. potassium chloride 10 mEq tablet Commonly known as:  KLOR-CON Take 1 Tab by mouth daily. predniSONE 20 mg tablet Commonly known as:  Reggie Doheny Take 1 Tab by mouth daily. traMADol 50 mg tablet Commonly known as:  ULTRAM  
Take 1-2 Tabs by mouth two (2) times daily as needed for Pain. Max Daily Amount: 200 mg. **DO NOT TAKE WITH FLEXERIL**  
  
 VITAMIN C 250 mg tablet Generic drug:  ascorbic acid (vitamin C) Take  by mouth. * Notice: This list has 2 medication(s) that are the same as other medications prescribed for you. Read the directions carefully, and ask your doctor or other care provider to review them with you. We Performed the Following AMB POC XRAY, FOOT; COMPLETE, 3+ VIEW [20832 CPT(R)] Patient Instructions Please follow up in 4 weeks. You are advised to contact us if your condition worsens. Broken Foot: Care Instructions Your Care Instructions A broken foot, or foot fracture, is a break in one or more of the bones in your foot. It may happen because of a sports injury, a fall, or other accident. A compound, or open, fracture occurs when a bone breaks through the skin. A break that does not poke through the skin is a closed fracture. Your treatment depends on the location and type of break in your foot. You may need a splint, a cast, or an orthopedic shoe. Certain kinds of injuries may need surgery at some time. Whatever your treatment, you can ease symptoms and help your foot heal with care at home. You may need 6 to 8 weeks or more to fully heal. 
You heal best when you take good care of yourself. Eat a variety of healthy foods, and don't smoke. Follow-up care is a key part of your treatment and safety. Be sure to make and go to all appointments, and call your doctor if you are having problems. It's also a good idea to know your test results and keep a list of the medicines you take. How can you care for yourself at home? · Be safe with medicines. Take pain medicines exactly as directed. ¨ If the doctor gave you a prescription medicine for pain, take it as prescribed. ¨ If you are not taking a prescription pain medicine, ask your doctor if you can take an over-the-counter medicine. · Leave the splint on until your follow-up appointment. Do not put any weight on the injured foot. If you were given crutches, use them as directed. · Put ice or a cold pack on your foot for 10 to 20 minutes at a time. Try to do this every 1 to 2 hours for the next 3 days (when you are awake) or until the swelling goes down. Put a thin cloth between the ice and your skin. · Prop up the sore foot on a pillow anytime you sit or lie down during the next 3 days. Try to keep it above the level of your heart. This will help reduce swelling. · Follow the cast care instructions your doctor gives you. If you have a splint, do not take it off unless your doctor tells you to. Cast and splint care · If you have a removable splint, ask your doctor if it is okay to remove it to bathe. Your doctor may want you to keep it on as much as possible. · Keep your plaster splint covered by taping a sheet of plastic around it when you bathe. Water under the plaster can cause your skin to itch and hurt. · Never cut your splint. When should you call for help? Call 911 anytime you think you may need emergency care. For example, call if: 
? · You have chest pain, are short of breath, or you cough up blood. ?Call your doctor now or seek immediate medical care if: 
? · You have new or worse pain. ? · Your foot is cool or pale or changes color. ? · You have tingling, weakness, or numbness in your toes. ? · Your cast or splint feels too tight. ? · You have signs of a blood clot in your leg (called a deep vein thrombosis), such as: 
¨ Pain in your calf, back of the knee, thigh, or groin. ¨ Redness or swelling in your leg. ? Watch closely for changes in your health, and be sure to contact your doctor if: 
? · You have a problem with your splint or cast.  
? · You do not get better as expected. Where can you learn more? Go to http://nilo-cindy.info/. Enter E699 in the search box to learn more about \"Broken Foot: Care Instructions. \" Current as of: March 21, 2017 Content Version: 11.4 © 7425-2356 Healthwise, Incorporated. Care instructions adapted under license by [employer] (which disclaims liability or warranty for this information). If you have questions about a medical condition or this instruction, always ask your healthcare professional. [address] any warranty or liability for your use of this information. Introducing [facility] & HEALTH SERVICES! Blossom Laird introduces GoFormz patient portal. Now you can access parts of your medical record, email your doctor's office, and request medication refills online. 1. In your internet browser, go to [email or web address] 2. Click on the First Time User? Click Here link in the Sign In box. You will see the New Member Sign Up page. 3. Enter your GoFormz Access Code exactly as it appears below. You will not need to use this code after youve completed the sign-up process. If you do not sign up before the expiration date, you must request a new code. · GoFormz Access Code: [email or web address] Expires: 7/12/2018 12:11 PM 
 
4. Enter the last four digits of your Social Security Number (xxxx) and Date of Birth (mm/dd/yyyy) as indicated and click Submit. You will be taken to the next sign-up page. 5. Create a GoFormz ID. This will be your GoFormz login ID and cannot be changed, so think of one that is secure and easy to remember. 6. Create a GoFormz password. You can change your password at any time. 7. Enter your Password Reset Question and Answer. This can be used at a later time if you forget your password. 8. Enter your e-mail address. You will receive e-mail notification when new information is available in [address] 9. Click Sign Up. You can now view and download portions of your medical record. 10. Click the Download Summary menu link to download a portable copy of your medical information. If you have questions, please visit the Frequently Asked Questions section of the [employer] website. Remember, [employer] is NOT to be used for urgent needs. For medical emergencies, dial 911. Now available from your iPhone and Android! Please provide this summary of care documentation to your next provider. Your primary care clinician is listed as Mulugeta Arce. If you have any questions after today's visit, please call [phone].

## 2018-05-29 NOTE — LETTER
NOTIFICATION RETURN TO WORK / SCHOOL 
 
5/29/2018 3:22 PM 
 
Ms. Belen [address] 
[address] [place] [address] To Whom It May Concern: 
 
Belen Dozier is currently under the care of [address] [place] [address] Guy Gonzalez. She will remain out of work for the next 6-8 weeks due to being unable to stand and walk for long periods at a time. If there are questions or concerns please have the patient contact our office.  
 
 
 
Sincerely, 
 
 
Jaime Burton MD

## 2018-05-29 NOTE — PATIENT INSTRUCTIONS
Please follow up in 4 weeks. You are advised to contact us if your condition worsens. Broken Foot: Care Instructions  Your Care Instructions    A broken foot, or foot fracture, is a break in one or more of the bones in your foot. It may happen because of a sports injury, a fall, or other accident. A compound, or open, fracture occurs when a bone breaks through the skin. A break that does not poke through the skin is a closed fracture. Your treatment depends on the location and type of break in your foot. You may need a splint, a cast, or an orthopedic shoe. Certain kinds of injuries may need surgery at some time. Whatever your treatment, you can ease symptoms and help your foot heal with care at home. You may need 6 to 8 weeks or more to fully heal.  You heal best when you take good care of yourself. Eat a variety of healthy foods, and don't smoke. Follow-up care is a key part of your treatment and safety. Be sure to make and go to all appointments, and call your doctor if you are having problems. It's also a good idea to know your test results and keep a list of the medicines you take. How can you care for yourself at home? · Be safe with medicines. Take pain medicines exactly as directed. ¨ If the doctor gave you a prescription medicine for pain, take it as prescribed. ¨ If you are not taking a prescription pain medicine, ask your doctor if you can take an over-the-counter medicine. · Leave the splint on until your follow-up appointment. Do not put any weight on the injured foot. If you were given crutches, use them as directed. · Put ice or a cold pack on your foot for 10 to 20 minutes at a time. Try to do this every 1 to 2 hours for the next 3 days (when you are awake) or until the swelling goes down. Put a thin cloth between the ice and your skin. · Prop up the sore foot on a pillow anytime you sit or lie down during the next 3 days. Try to keep it above the level of your heart.  This will help reduce swelling. · Follow the cast care instructions your doctor gives you. If you have a splint, do not take it off unless your doctor tells you to. Cast and splint care  · If you have a removable splint, ask your doctor if it is okay to remove it to bathe. Your doctor may want you to keep it on as much as possible. · Keep your plaster splint covered by taping a sheet of plastic around it when you bathe. Water under the plaster can cause your skin to itch and hurt. · Never cut your splint. When should you call for help? Call 911 anytime you think you may need emergency care. For example, call if:  ? · You have chest pain, are short of breath, or you cough up blood. ?Call your doctor now or seek immediate medical care if:  ? · You have new or worse pain. ? · Your foot is cool or pale or changes color. ? · You have tingling, weakness, or numbness in your toes. ? · Your cast or splint feels too tight. ? · You have signs of a blood clot in your leg (called a deep vein thrombosis), such as:  ¨ Pain in your calf, back of the knee, thigh, or groin. ¨ Redness or swelling in your leg. ? Watch closely for changes in your health, and be sure to contact your doctor if:  ? · You have a problem with your splint or cast.   ? · You do not get better as expected. Where can you learn more? Go to http://nilo-cindy.info/. Enter V193 in the search box to learn more about \"Broken Foot: Care Instructions. \"  Current as of: March 21, 2017  Content Version: 11.4  © 7857-7072 [employer]. Care instructions adapted under license by [employer] (which disclaims liability or warranty for this information). If you have questions about a medical condition or this instruction, always ask your healthcare professional. [address] any warranty or liability for your use of this information.

## 2018-05-29 NOTE — LETTER
NOTIFICATION RETURN TO WORK / SCHOOL 
 
5/29/2018 3:26 PM 
 
Ms. Ericka Arias [address] 
[address] To Whom It May Concern: 
 
Ericka Arias is currently under the care of [address] [place] [address] Guy Gonzalez. She will remain on sedentary duty only at this time and will be re evaluated in 4 weeks. If there are questions or concerns please have the patient contact our office.  
 
 
 
Sincerely, 
 
 
Antwan Fleming MD

## 2018-05-29 NOTE — LETTER
NOTIFICATION RETURN TO WORK / SCHOOL 
 
5/29/2018 3:24 PM 
 
Ms. Anita Cardozo [address] 
[address] [place] [address] To Whom It May Concern: 
 
Anita Cardozo is currently under the care of [address] [place], [place] [address]edson Roquevard. She will remain out sedentary duty only at this time and will be re evaluated in 4 weeks. If there are questions or concerns please have the patient contact our office.  
 
 
 
Sincerely, 
 
 
Nato Jimenez MD

## 2018-05-29 NOTE — PROGRESS NOTES
AMBULATORY PROGRESS NOTE      Patient: Elyse Lockhart             MRN: [id number]     SSN: [id number] Body mass index is 37.91 kg/(m^2). YOB: 1955     AGE: 58 y.o. EX: female    PCP: Ronda Crane MD    IMPRESSION/DIAGNOSIS AND TREATMENT PLAN     DIAGNOSES  1. Closed fracture of right foot with routine healing, subsequent encounter        Orders Placed This Encounter    [60316] Foot Min 3V      Elyse Lockhart understands her diagnoses and the proposed plan. Plan:    1) Work Note  2) Continue activity modification as directed. RTO - 4 weeks / PLEASE OBTAIN X-RAYS OF: right foot 3 VIEWS    HPI AND EXAMINATION     Elyse Lockhart IS A 58 y.o. female who presents to my outpatient office for follow up of a closed nondisplaced fracture of the second metatarsal bone of the right foot. At last visit, I provided a temporary DMV placard, gave a work note, provided a right CAM walker boot, and prescribed Norco 5 mg. Right ANKLE and FOOT         Gait: slow  Tenderness: moderate tenderness across 2nd metatarsal heads. Cutaneous: No rashes, skin patches, wounds, or abrasions to the lower legs                                Warm and Normal color. No regions of expressible drainage.                                Medial Border of Tibia Region: absent                                Skin color, texture, turgor normal. Normal.                                Mild swelling from forefoot to midfoot. Joint Motion: ROM Ankle:Normal , Hindfoot: (ST,TN,CC Normal}, Forefoot toes:Decreased  Neurologic Exam: Neuro: Motor: normal 5/5 strength in all tested muscle groups and Sensory : no sensory deficits noted. Contractures: Gastrocnemius or Achilles Contractures absent  Joint / Tendon Stability: No Ankle or Subtalar instability or joint laxity.                                                                  No peroneal sublux ability or dislocation  Alignment: Slight Pes Planus  Vascular: Normal Pulses/ NL Capillary refill, No evidence of DVT seen on physical exam.                        No calf swelling, no tenderness to calf muscles. Lymphatic:  No Evidence of Lymphedema. CHART REVIEW     Past Medical History:   Diagnosis Date    Asthma     Dr. Lizbeth House [facility])     Lung Cancer s/p right lobectomy Dr Sydni Sahu 10/06    Chronic obstructive pulmonary disease ([address]     and right lung nodules, stable on serial CT last 6/14    Colon adenoma     2009 Dr. Val Martinez; adenoma and diverticulosis 9/12 Dr. Ta Ortiz; 12/26/17    FHx: breast cancer     Glaucoma     Dr Theresa Morocho Hyperlipidemia 5/14/2018    Hypovitaminosis D 2011    IFG (impaired fasting glucose)     Morbid obesity (HCC)     peak weight 209 lbs, bmi 37 from 2/14    PUD (peptic ulcer disease)     h pylori tx'ed    Sleep apnea     on CPAP, Dr. Eduardo Luke 2010    Venous insufficiency      Current Outpatient Prescriptions   Medication Sig    ergocalciferol (VITAMIN D2) 50,000 unit capsule Take 1 Cap by mouth every seven (7) days.  potassium chloride (K-DUR, KLOR-CON) 10 mEq tablet Take 1 Tab by mouth daily.  magnesium oxide (MAG-OX) 400 mg tablet Take 400 mg by mouth daily.  ascorbic acid (VITAMIN C) 250 mg tablet Take  by mouth.  albuterol (PROVENTIL HFA, VENTOLIN HFA) 90 mcg/actuation inhaler Take 2 Puffs by inhalation every four (4) hours as needed.  HYDROcodone-acetaminophen (NORCO) 5-325 mg per tablet Take 1-2 Tabs by mouth two (2) times daily as needed for Pain. Max Daily Amount: 4 Tabs.  traMADol (ULTRAM) 50 mg tablet Take 1-2 Tabs by mouth two (2) times daily as needed for Pain. Max Daily Amount: 200 mg. **DO NOT TAKE WITH FLEXERIL**    indomethacin (INDOCIN) 50 mg capsule Take  by mouth three (3) times daily.  HYDROcodone-acetaminophen (NORCO) 5-325 mg per tablet Take 1 Tab by mouth every eight (8) hours as needed for Pain. Max Daily Amount: 3 Tabs.     predniSONE (DELTASONE) 20 mg tablet Take 1 Tab by mouth daily.  omeprazole (PRILOSEC) 40 mg capsule Take 1 Cap by mouth daily.  furosemide (LASIX) 20 mg tablet One tablet daily    cyclobenzaprine (FLEXERIL) 10 mg tablet Take 1 Tab by mouth three (3) times daily as needed for Muscle Spasm(s).  lidocaine-EPINEPHrine (XYLOCAINE) 1 %-1:100,000 injection 10 mL by IntraDERMal route once for 1 dose. Indications: ADMINISTRATION OF LOCAL ANESTHESIA    beclomethasone (QVAR) 80 mcg/actuation inhaler Take 1 Puff by inhalation two (2) times a day.  multivitamin (ONE A DAY) tablet Take 1 Tab by mouth daily. No current facility-administered medications for this visit. Allergies   Allergen Reactions    Advair Diskus [Fluticasone-Salmeterol] Other (comments)     Hoarseness    Naproxen Unable to Obtain    Pcn [Penicillins] Rash     Past Surgical History:   Procedure Laterality Date    CARDIAC SURG PROCEDURE UNLIST  11/07    thallium negative    CARDIAC SURG PROCEDURE UNLIST  12/2017    echo showed nl lv, ef 65%, gr 1 dd; Dr Mauro Recinos  09/2016    ex stress echo without clear wma Dr Richard Laurent  11/07    negative    EGD      PUD on EGD, H pylori +txed 2009.  Shelrey Pu      Dr Emily Menezes 2009 adenoma; Dr Rhoda Cage 9/12  adenoma and tics; 12/26/17 sessile serrated and tubular adenomas and tics    HX HEENT      thyroid US negative 6/14    HX HYSTERECTOMY  1993    for fibroids    HX LOBECTOMY      for cancer, Dr. Juany Galdamez 10/06. No adjuvant tx. She has another spot on the same side that is being followed every three months.  HX ORTHOPAEDIC      Left foot ([place]) and knee surgery x2 Dr. Sellers Ranks.   Right foot fx 4/18 Dr Bertin Lamb EXT [address]  6/11    negative     Social History     Occupational History    [employer]       Social History Main Topics    Smoking status: Former Smoker     Packs/day: 0.50     Years: 20.00     Types: Cigarettes     Quit date: 1/1/1990    Smokeless tobacco: Never Used    Alcohol use No    Drug use: No    Sexual activity: Not Currently     Family History   Problem Relation Age of Onset    Breast Cancer Mother     Other Brother      SLE    Other Brother      SLE    Other Brother      SLE    Hypertension Father        REVIEW OF SYSTEMS : 5/29/2018  ALL BELOW ARE Negative except : SEE HPI       Constitutional: Negative for fever, chills and weight loss. Neg Weigh Loss  Cardiovascular: Negative for chest pain, claudication and leg swelling. SOB, RAHMAN   Gastrointestinal: Negative for  pain, N/V/D/C, Blood in stool or urine,dysuria, hematuria,        Incontinence, pelvic pain  Musculoskeletal: see HPI. Neurological: Negative for dizziness and weakness. Negative for headaches,Visual Changes, Confusion, Seizures,   Psychiatric/Behavioral: Negative for depression, memory loss and substance abuse. Extremities:  Negative for  hair changes, rash or skin lesion changes. Hematologic: Negative for Bleeding problems, bruising, pallor or swollen lymph nodes. Peripheral Vascular: No calf pain, vascular vein tenderness to calf pain              No calf throbbing, posterior knee throbbing pain    DIAGNOSTIC IMAGING     FOOT X RAYS 3 VIEWS Right   5/29/2018    NON WEIGHT BEARING    X RAYS AT [address] [place] [address]  5/29/2018      Bones: healing right 2nd metatarsal Fx // NO dislocations. No focal osteolytic or osteoblastic process     Bone Spurs: No significant bone spurs  Foot Alignment: WNL  Joint Condition: No Significant OA  Soft Tissues: Normal, No radiopaque foreign body and No abnormal calcific densities to soft tissues   No ankle joint effusion in lateral projection. Mineralization: Suggests  no Osteopenia    I have personally reviewed the results of the above study.  The interpretation of this study is my professional opinion      Written by Beck Render, as dictated by Presley Gonzales MD. I, DrPresley Roberts MD, confirm that all documentation is accurate.

## 2018-05-29 NOTE — PROCEDURES
FOOT X RAYS 3 VIEWS Right   5/29/2018    NON WEIGHT BEARING    X RAYS AT [address] [place] [address]  5/29/2018      Bones: healing right 2nd metatarsal Fx // NO dislocations. No focal osteolytic or osteoblastic process     Bone Spurs: No significant bone spurs  Foot Alignment: WNL  Joint Condition: No Significant OA  Soft Tissues: Normal, No radiopaque foreign body and No abnormal calcific densities to soft tissues   No ankle joint effusion in lateral projection. Mineralization: Suggests  no Osteopenia    I have personally reviewed the results of the above study.  The interpretation of this study is my professional opinion

## 2018-06-03 ENCOUNTER — TELEPHONE (OUTPATIENT)
Dept: INTERNAL MEDICINE CLINIC | Age: 63
End: 2018-06-03

## 2018-07-18 ENCOUNTER — OFFICE VISIT (OUTPATIENT)
Dept: ORTHOPEDIC SURGERY | Age: 63
End: 2018-07-18

## 2018-07-18 VITALS
HEIGHT: 63 IN | WEIGHT: 217 LBS | TEMPERATURE: 97.9 F | OXYGEN SATURATION: 96 % | SYSTOLIC BLOOD PRESSURE: 143 MMHG | HEART RATE: 73 BPM | BODY MASS INDEX: 38.45 KG/M2 | DIASTOLIC BLOOD PRESSURE: 68 MMHG | RESPIRATION RATE: 16 BRPM

## 2018-07-18 DIAGNOSIS — M76.61 TENDONITIS, ACHILLES, RIGHT: ICD-10-CM

## 2018-07-18 DIAGNOSIS — M76.71 PERONEAL TENDONITIS, RIGHT: ICD-10-CM

## 2018-07-18 DIAGNOSIS — S92.324A: ICD-10-CM

## 2018-07-18 DIAGNOSIS — M19.071 PRIMARY OSTEOARTHRITIS OF RIGHT FOOT: ICD-10-CM

## 2018-07-18 DIAGNOSIS — M79.671 RIGHT FOOT PAIN: Primary | ICD-10-CM

## 2018-07-18 RX ORDER — DICLOFENAC SODIUM 10 MG/G
GEL TOPICAL 4 TIMES DAILY
Qty: 100 G | Refills: 1 | Status: SHIPPED | OUTPATIENT
Start: 2018-07-18 | End: 2018-11-21

## 2018-07-18 NOTE — LETTER
NOTIFICATION RETURN TO WORK / SCHOOL 
 
7/18/2018 4:42 PM 
 
Ms. Garret Goldmann [address] 
[address] To Whom It May Concern: 
 
Garret Goldmann is currently under the care of [address] [place] [address] Guy Gonzalez. She will remain out of work until her follow up in 4 weeks. If there are questions or concerns please have the patient contact our office.  
 
 
 
Sincerely, 
 
 
Taryn Alarcon MD

## 2018-07-18 NOTE — MR AVS SNAPSHOT
[address], [address] 200 [facility] 
[phone] Patient: Christa Layne MRN: [id number] VFI:[id number] Visit Information Date & Time Provider Department Dept. Phone Encounter #  
 7/18/2018  2:20 PM Vazquez Rosalie, [address] [facility] [phone] Your Appointments 11/20/2018  2:00 PM  
PHYSICAL with Alex Aldrich MD  
[facility] 3651 [facility]) Appt Note: pe rd labs at lab [address], [address] [address] [address] [place]  
  
   
 [address], [address] Upcoming Health Maintenance Date Due Hepatitis C Screening 1955 Influenza Age 5 to Adult 8/1/2018 BREAST CANCER SCRN MAMMOGRAM 11/13/2019 COLONOSCOPY 12/26/2022 DTaP/Tdap/Td series (2 - Td) 8/31/2026 Allergies as of 7/18/2018  Review Complete On: 7/18/2018 By: Tawnya Marquez PA-C Severity Noted Reaction Type Reactions Advair Diskus [Fluticasone-salmeterol]   Intolerance Other (comments) Hoarseness Naproxen    Unable to Obtain Pcn [Penicillins]    Rash Current Immunizations  Reviewed on 8/28/2012 Name Date  
 TD Vaccine 1/1/1990 Not reviewed this visit You Were Diagnosed With   
  
 Codes Comments Right foot pain    -  Primary ICD-10-CM: M00.303 ICD-9-CM: 729.5 Tendonitis, Achilles, right     ICD-10-CM: M76.61 
ICD-9-CM: 726.71 Nondisp fx of second metatarsal bone, right foot, init     ICD-10-CM: B21.435C ICD-9-CM: 825.25 Primary osteoarthritis of right foot     ICD-10-CM: M19.071 ICD-9-CM: 715.17 Peroneal tendonitis, right     ICD-10-CM: M76.71 
ICD-9-CM: 726.79 Vitals BP Pulse Temp Resp Height(growth percentile) Weight(growth percentile) 143/68 73 97.9 °F (36.6 °C) (Oral) 16 5' 3\" (1.6 m) 217 lb (98.4 kg) SpO2 BMI OB Status Smoking Status 96% 38.44 kg/m2 Hysterectomy Former Smoker BMI and BSA Data Body Mass Index Body Surface Area  
 38.44 kg/m 2 2.09 m 2 Preferred Pharmacy Pharmacy Name Phone [address] [place] [address], [address] [place] [address] [phone] Your Updated Medication List  
  
   
This list is accurate as of 7/18/18  4:42 PM.  Always use your most recent med list.  
  
  
  
  
 albuterol 90 mcg/actuation inhaler Commonly known as:  PROVENTIL HFA, VENTOLIN HFA, PROAIR HFA Take 2 Puffs by inhalation every four (4) hours as needed. beclomethasone 80 mcg/actuation [employer] Commonly known as:  QVAR Take 1 Puff by inhalation two (2) times a day. diclofenac 1 % Gel Commonly known as:  VOLTAREN Apply  to affected area four (4) times daily. USE AS DIRECTED  
  
 ergocalciferol 50,000 unit capsule Commonly known as:  VITAMIN D2 Take 1 Cap by mouth every seven (7) days. lidocaine-EPINEPHrine 1 %-1:100,000 injection Commonly known as:  XYLOCAINE 10 mL by IntraDERMal route once for 1 dose. Indications: ADMINISTRATION OF LOCAL ANESTHESIA  
  
 magnesium oxide 400 mg tablet Commonly known as:  MAG-OX Take 400 mg by mouth daily. multivitamin tablet Commonly known as:  ONE A DAY Take 1 Tab by mouth daily. potassium chloride 10 mEq tablet Commonly known as:  KLOR-CON Take 1 Tab by mouth daily. VITAMIN C 250 mg tablet Generic drug:  ascorbic acid (vitamin C) Take  by mouth. Prescriptions Printed Refills  
 diclofenac (VOLTAREN) 1 % gel 1 Sig: Apply  to affected area four (4) times daily. USE AS DIRECTED Class: Print Route: Topical  
  
We Performed the Following AMB POC XRAY, FOOT; COMPLETE, 3+ VIEW [79398 CPT(R)] AMB SUPPLY ORDER [[id number] Custom] Comments:  
 7/18/2018 
4:31 PM 
 
Custom SMO or AFO orthotic, right To-Do List   
 08/01/2018 Imaging:  MRI ANKLE RT WO CONT   
  
 08/01/2018 Imaging:  MRI FOOT RT WO CONT Referral Information Referral ID Referred By Referred To  
  
 [id number] Julián BOYD Not Available Visits Status Start Date End Date 1 New Request 7/18/18 7/18/19 If your referral has a status of pending review or denied, additional information will be sent to support the outcome of this decision. Referral ID Referred By Referred To  
 [id number] Julián BOYD Not Available Visits Status Start Date End Date 1 New Request 7/18/18 7/18/19 If your referral has a status of pending review or denied, additional information will be sent to support the outcome of this decision. Patient Instructions Order provided for custom orthotic, right MRI ordered for right foot and ankle Tubigrip provided for swelling Rx provided for Voltaren gel Continue work restrictions Please follow up after MRI completed Broken Foot: Care Instructions Your Care Instructions A broken foot, or foot fracture, is a break in one or more of the bones in your foot. It may happen because of a sports injury, a fall, or other accident. A compound, or open, fracture occurs when a bone breaks through the skin. A break that does not poke through the skin is a closed fracture. Your treatment depends on the location and type of break in your foot. You may need a splint, a cast, or an orthopedic shoe. Certain kinds of injuries may need surgery at some time. Whatever your treatment, you can ease symptoms and help your foot heal with care at home. You may need 6 to 8 weeks or more to fully heal. 
You heal best when you take good care of yourself. Eat a variety of healthy foods, and don't smoke. Follow-up care is a key part of your treatment and safety.  Be sure to make and go to all appointments, and call your doctor if you are having problems. It's also a good idea to know your test results and keep a list of the medicines you take. How can you care for yourself at home? · Be safe with medicines. Take pain medicines exactly as directed. ¨ If the doctor gave you a prescription medicine for pain, take it as prescribed. ¨ If you are not taking a prescription pain medicine, ask your doctor if you can take an over-the-counter medicine. · Leave the splint on until your follow-up appointment. Do not put any weight on the injured foot. If you were given crutches, use them as directed. · Put ice or a cold pack on your foot for 10 to 20 minutes at a time. Try to do this every 1 to 2 hours for the next 3 days (when you are awake) or until the swelling goes down. Put a thin cloth between the ice and your skin. · Prop up the sore foot on a pillow anytime you sit or lie down during the next 3 days. Try to keep it above the level of your heart. This will help reduce swelling. · Follow the cast care instructions your doctor gives you. If you have a splint, do not take it off unless your doctor tells you to. Cast and splint care · If you have a removable splint, ask your doctor if it is okay to remove it to bathe. Your doctor may want you to keep it on as much as possible. · Keep your plaster splint covered by taping a sheet of plastic around it when you bathe. Water under the plaster can cause your skin to itch and hurt. · Never cut your splint. When should you call for help? Call 911 anytime you think you may need emergency care.  For example, call if: 
  · You have chest pain, are short of breath, or you cough up blood.  
 Call your doctor now or seek immediate medical care if: 
  · You have new or worse pain.  
  · Your foot is cool or pale or changes color.  
  · You have tingling, weakness, or numbness in your toes.  
  · Your cast or splint feels too tight.  
  · You have signs of a blood clot in your leg (called a deep vein thrombosis), such as: 
¨ Pain in your calf, back of the knee, thigh, or groin. ¨ Redness or swelling in your leg.  
 Watch closely for changes in your health, and be sure to contact your doctor if: 
  · You have a problem with your splint or cast.  
  · You do not get better as expected. Where can you learn more? Go to http://nilo-cindy.info/. Enter D943 in the search box to learn more about \"Broken Foot: Care Instructions. \" Current as of: November 29, 2017 Content Version: 11.7 © 0381-4269 [employer]. Care instructions adapted under license by [employer] (which disclaims liability or warranty for this information). If you have questions about a medical condition or this instruction, always ask your healthcare professional. [address] any warranty or liability for your use of this information. Introducing [facility]! Maylin Canela introduces lifeaction games patient portal. Now you can access parts of your medical record, email your doctor's office, and request medication refills online. 1. In your internet browser, go to [email or web address] 2. Click on the First Time User? Click Here link in the Sign In box. You will see the New Member Sign Up page. 3. Enter your lifeaction games Access Code exactly as it appears below. You will not need to use this code after youve completed the sign-up process. If you do not sign up before the expiration date, you must request a new code. · lifeaction games Access Code: [email or web address] Expires: 10/16/2018  4:29 PM 
 
4. Enter the last four digits of your Social Security Number (xxxx) and Date of Birth (mm/dd/yyyy) as indicated and click Submit. You will be taken to the next sign-up page. 5. Create a lifeaction games ID. This will be your lifeaction games login ID and cannot be changed, so think of one that is secure and easy to remember. 6. Create a [employer]Energy password. You can change your password at any time. 7. Enter your Password Reset Question and Answer. This can be used at a later time if you forget your password. 8. Enter your e-mail address. You will receive e-mail notification when new information is available in [address] 9. Click Sign Up. You can now view and download portions of your medical record. 10. Click the Download Summary menu link to download a portable copy of your medical information. If you have questions, please visit the Frequently Asked Questions section of the [employer] website. Remember, [employer] is NOT to be used for urgent needs. For medical emergencies, dial 911. Now available from your iPhone and Android! Please provide this summary of care documentation to your next provider. Your primary care clinician is listed as Kelvin Stauffer. If you have any questions after today's visit, please call [phone].

## 2018-07-18 NOTE — LETTER
NOTIFICATION RETURN TO WORK / SCHOOL 
 
7/18/2018 4:44 PM 
 
Ms. Anju [address] 
[address] To Whom It May Concern: 
 
Anju Villatoro is currently under the care of [address] [place] [address] Guy Gonzalez. She will return to work on a light duty status, sedentary work only until her follow up appointment in 4 weeks. If there are questions or concerns please have the patient contact our office.  
 
 
 
Sincerely, 
 
 
Karina Ojeda MD

## 2018-07-18 NOTE — PROGRESS NOTES
Patient: Jose Angel Barber                MRN: [id number]       SSN: [id number]  YOB: 1955                     AGE: 58 y.o. SEX: female    Imani Newell MD      Chief Complaint:   Chief Complaint   Patient presents with    Foot Pain     right foot f/u            HPI:     Jose Angel Barber is a 58 y.o. female who presents today for evaluation of closed nondisplaced fracture of the second metatarsal bone of the right foot. Patient was last seen in the office on 5/29/2018 by Dr. Sang Contreras. The patient's prior history is detailed in the previous encounter. At the last visit, the patient's plan was as follows:      Plan:    1) Work Note  2) Continue activity modification as directed. 3) RTO - 4 weeks / PLEASE OBTAIN X-RAYS OF: right foot 3 VIEWS    Patient continues to have significant pain to right mid-foot and hindfoot. She states she has persistent swelling and pain with WB. She continues to wear the CAM boot. She is not taking any medication for pain. She is working light duty at her regular job and she states she cannot tolerate being on her right foot on her second job at [employer]. She denies any new illness or injury since last seen in the office. PHYSICAL EXAM:     Visit Vitals    /68    Pulse 73    Temp 97.9 °F (36.6 °C) (Oral)    Resp 16    Ht 5' 3\" (1.6 m)    Wt 217 lb (98.4 kg)    SpO2 96%    BMI 38.44 kg/m2     Pain Scale: 7/10     Right ANKLE and FOOT         Gait: slow  Tenderness: moderate tenderness across 2nd metatarsal head, 5th metatarsal base, peroneal tendons, midfoot and hindfoot near Achilles tendon insertion. No TTP along posterior tibial tendon  Cutaneous: No rashes, skin patches, wounds, or abrasions to the lower legs                                Warm and Normal color.  No regions of expressible drainage.                                Medial Border of Tibia Region: absent                                Skin color, texture, turgor normal. Normal.                                Mild swelling from forefoot to midfoot. Joint Motion: ROM Ankle:Normal , Hindfoot: (ST,TN,CC Normal}, Forefoot toes:Decreased  Neurologic Exam: Neuro: Motor: normal 5/5 strength in all tested muscle groups and Sensory : no sensory deficits noted. Contractures: Gastrocnemius or Achilles Contractures absent  Joint / Tendon Stability: No Ankle or Subtalar instability or joint laxity.                                                               No peroneal sublux ability or dislocation  Alignment: Slight Pes Planus  Vascular: Normal Pulses/ NL Capillary refill, No evidence of DVT seen on physical exam.                        No calf swelling, no tenderness to calf muscles. Lymphatic:  No Evidence of Lymphedema. IMPRESSION:     1. Right foot pain    2. Tendonitis, Achilles, right    3. Nondisp fx of second metatarsal bone, right foot, init    4. Primary osteoarthritis of right foot    5. Peroneal tendonitis, right          PLAN:         Orders Placed This Encounter    Generic Supply Order    [40629] Foot Min 3V    MRI ANKLE RT WO CONT    diclofenac (VOLTAREN) 1 % gel               Order provided for custom orthotic, right  MRI ordered for right ankle to evaluate peroneal tendon and achilles tendon. MRI of the right foot previously completed in April 2018  Tubigrip provided for swelling  Rx provided for Voltaren gel   Continue work restrictions  Please follow up after MRI completed              Patient has been discussed with Dr. Dillon Vale during this visit and he agrees with the assessment and plan. Patient understands treatment plan and has been provided with patient education.         PAST MEDICAL HISTORY:     Past Medical History:   Diagnosis Date    Asthma     Dr. Lorrie Whiting [facility])     Lung Cancer s/p right lobectomy Dr Neil Mujica 10/06    Chronic obstructive pulmonary disease ([facility]     and right lung nodules, stable on serial CT last 6/14  Colon adenoma     2009 Dr. Gurney Sandifer; adenoma and diverticulosis 9/12 Dr. Yanna Farrell; 12/26/17    FHx: breast cancer     Glaucoma     Dr Burgess Sutton Hyperlipidemia 5/14/2018    Hypovitaminosis D 2011    IFG (impaired fasting glucose)     Morbid obesity (HCC)     peak weight 209 lbs, bmi 37 from 2/14    PUD (peptic ulcer disease)     h pylori tx'ed    Sleep apnea     on CPAP, Dr. Guille Panda 2010    Venous insufficiency        MEDICATIONS:     Current Outpatient Prescriptions   Medication Sig    diclofenac (VOLTAREN) 1 % gel Apply  to affected area four (4) times daily. USE AS DIRECTED    ergocalciferol (VITAMIN D2) 50,000 unit capsule Take 1 Cap by mouth every seven (7) days.  potassium chloride (K-DUR, KLOR-CON) 10 mEq tablet Take 1 Tab by mouth daily.  magnesium oxide (MAG-OX) 400 mg tablet Take 400 mg by mouth daily.  ascorbic acid (VITAMIN C) 250 mg tablet Take  by mouth.  beclomethasone (QVAR) 80 mcg/actuation inhaler Take 1 Puff by inhalation two (2) times a day.  albuterol (PROVENTIL HFA, VENTOLIN HFA) 90 mcg/actuation inhaler Take 2 Puffs by inhalation every four (4) hours as needed.  multivitamin (ONE A DAY) tablet Take 1 Tab by mouth daily.  lidocaine-EPINEPHrine (XYLOCAINE) 1 %-1:100,000 injection 10 mL by IntraDERMal route once for 1 dose. Indications: ADMINISTRATION OF LOCAL ANESTHESIA     No current facility-administered medications for this visit.         ALLERGIES:     Allergies   Allergen Reactions    Advair Diskus [Fluticasone-Salmeterol] Other (comments)     Hoarseness    Naproxen Unable to Obtain    Pcn [Penicillins] Rash         PAST SURGICAL HISTORY:     Past Surgical History:   Procedure Laterality Date    CARDIAC SURG PROCEDURE UNLIST  11/07    thallium negative    CARDIAC SURG PROCEDURE UNLIST  12/2017    echo showed nl lv, ef 65%, gr 1 dd; Dr Mina Ramirez  09/2016    ex stress echo without clear wma Dr Judi Cruz 11/07    negative    EGD      PUD on EGD, H pylori +txed 2009.  Saundra Esquivel Neighbor      Dr. Carlos Mackey 2009 adenoma; Dr Isela Ma 9/12  adenoma and tics; 12/26/17 sessile serrated and tubular adenomas and tics    HX HEENT      thyroid US negative 6/14    HX HYSTERECTOMY  1993    for fibroids    HX LOBECTOMY      for cancer, Dr. Neil Mujica 10/06. No adjuvant tx. She has another spot on the same side that is being followed every three months.  HX ORTHOPAEDIC      Left foot ([place]) and knee surgery x2 Dr. Elijah Ontiveros. Right foot fx 4/18 Dr David Chavez [address]T [address]  6/11    negative       SOCIAL HISTORY:     Social History     Social History    Marital status: SINGLE     Spouse name: N/A    Number of children: 0    Years of education: N/A     Occupational History    [place]       Social History Main Topics    Smoking status: Former Smoker     Packs/day: 0.50     Years: 20.00     Types: Cigarettes     Quit date: 1/1/1990    Smokeless tobacco: Never Used    Alcohol use No    Drug use: No    Sexual activity: Not Currently     Other Topics Concern    Not on file     Social History Narrative       FAMILY HISTORY:     Family History   Problem Relation Age of Onset    Breast Cancer Mother     Other Brother      SLE    Other Brother      SLE    Other Brother      SLE    Hypertension Father          REVIEW OF SYSTEMS:     Otherwise as noted in HPI      RADIOGRAPHS & DIAGNOSTIC STUDIES     Results for orders placed or performed in visit on 07/18/18   AMB POC XRAY, FOOT; COMPLETE, 3+ VIEW    Narrative    Zoe Tidwell PA-C     7/18/2018  9:06 PM  X-rays of the right foot        Bones: healing right 2nd metatarsal Fx // NO dislocations.  No   focal osteolytic or osteoblastic process                          Bone Spurs: No significant bone spurs  Foot Alignment: WNL  Joint Condition: No Significant OA  Soft Tissues: Normal, No radiopaque foreign body and No abnormal   calcific densities to soft tissues                        No ankle joint effusion in lateral   projection.   Mineralization: Suggests  no Osteopenia              Zaid JASON Hadley  7/18/2018

## 2018-07-18 NOTE — PATIENT INSTRUCTIONS
Order provided for custom orthotic, right  MRI ordered for right foot and ankle  Tubigrip provided for swelling  Rx provided for Voltaren gel   Continue work restrictions  Please follow up after MRI completed         Broken Foot: Care Instructions  Your Care Instructions    A broken foot, or foot fracture, is a break in one or more of the bones in your foot. It may happen because of a sports injury, a fall, or other accident. A compound, or open, fracture occurs when a bone breaks through the skin. A break that does not poke through the skin is a closed fracture. Your treatment depends on the location and type of break in your foot. You may need a splint, a cast, or an orthopedic shoe. Certain kinds of injuries may need surgery at some time. Whatever your treatment, you can ease symptoms and help your foot heal with care at home. You may need 6 to 8 weeks or more to fully heal.  You heal best when you take good care of yourself. Eat a variety of healthy foods, and don't smoke. Follow-up care is a key part of your treatment and safety. Be sure to make and go to all appointments, and call your doctor if you are having problems. It's also a good idea to know your test results and keep a list of the medicines you take. How can you care for yourself at home? · Be safe with medicines. Take pain medicines exactly as directed. ¨ If the doctor gave you a prescription medicine for pain, take it as prescribed. ¨ If you are not taking a prescription pain medicine, ask your doctor if you can take an over-the-counter medicine. · Leave the splint on until your follow-up appointment. Do not put any weight on the injured foot. If you were given crutches, use them as directed. · Put ice or a cold pack on your foot for 10 to 20 minutes at a time. Try to do this every 1 to 2 hours for the next 3 days (when you are awake) or until the swelling goes down. Put a thin cloth between the ice and your skin.   · Prop up the sore foot on a pillow anytime you sit or lie down during the next 3 days. Try to keep it above the level of your heart. This will help reduce swelling. · Follow the cast care instructions your doctor gives you. If you have a splint, do not take it off unless your doctor tells you to. Cast and splint care  · If you have a removable splint, ask your doctor if it is okay to remove it to bathe. Your doctor may want you to keep it on as much as possible. · Keep your plaster splint covered by taping a sheet of plastic around it when you bathe. Water under the plaster can cause your skin to itch and hurt. · Never cut your splint. When should you call for help? Call 911 anytime you think you may need emergency care. For example, call if:    · You have chest pain, are short of breath, or you cough up blood.    Call your doctor now or seek immediate medical care if:    · You have new or worse pain.     · Your foot is cool or pale or changes color.     · You have tingling, weakness, or numbness in your toes.     · Your cast or splint feels too tight.     · You have signs of a blood clot in your leg (called a deep vein thrombosis), such as:  ¨ Pain in your calf, back of the knee, thigh, or groin. ¨ Redness or swelling in your leg.    Watch closely for changes in your health, and be sure to contact your doctor if:    · You have a problem with your splint or cast.     · You do not get better as expected. Where can you learn more? Go to http://nilo-cindy.info/. Enter L499 in the search box to learn more about \"Broken Foot: Care Instructions. \"  Current as of: November 29, 2017  Content Version: 11.7  © 6804-4562 [employer]. Care instructions adapted under license by [employer] (which disclaims liability or warranty for this information).  If you have questions about a medical condition or this instruction, always ask your healthcare professional. [employer] disclaims any warranty or liability for your use of this information.

## 2018-07-19 NOTE — PROCEDURES
X-rays of the right foot        Bones: healing right 2nd metatarsal Fx // NO dislocations. No focal osteolytic or osteoblastic process                          Bone Spurs: No significant bone spurs  Foot Alignment: WNL  Joint Condition: No Significant OA  Soft Tissues: Normal, No radiopaque foreign body and No abnormal calcific densities to soft tissues                        No ankle joint effusion in lateral projection.   Mineralization: Suggests  no Osteopenia

## 2018-07-24 ENCOUNTER — TELEPHONE (OUTPATIENT)
Dept: ORTHOPEDIC SURGERY | Age: 63
End: 2018-07-24

## 2018-07-24 NOTE — TELEPHONE ENCOUNTER
Pt calling states she received a voicemail saying that she needs to do a test. Pt unsure what this is about. Please advise pt at [phone].

## 2018-07-24 NOTE — TELEPHONE ENCOUNTER
Spoke with patient, informed her that the phone call was for her MRI. Phone call was transferred to [facility].

## 2018-07-27 ENCOUNTER — HOSPITAL ENCOUNTER (OUTPATIENT)
Age: 63
Discharge: HOME | End: 2018-07-27
Attending: ORTHOPAEDIC SURGERY
Payer: COMMERCIAL

## 2018-07-27 DIAGNOSIS — M76.61 TENDONITIS, ACHILLES, RIGHT: ICD-10-CM

## 2018-07-27 PROCEDURE — 73721 MRI JNT OF LWR EXTRE W/O DYE: CPT

## 2018-07-30 ENCOUNTER — TELEPHONE (OUTPATIENT)
Dept: ORTHOPEDIC SURGERY | Age: 63
End: 2018-07-30

## 2018-07-30 ENCOUNTER — DOCUMENTATION ONLY (OUTPATIENT)
Dept: ORTHOPEDIC SURGERY | Age: 63
End: 2018-07-30

## 2018-07-30 NOTE — TELEPHONE ENCOUNTER
Patient called stating she is supposed to have an appointment at a \"prosthetic place' this morning at 11:00, but they told her they wanted her to have an MRI done first. She had the MRI done, but now she doesn't know whether she's supposed to come to our office first for a diagnostic test follow up appointment before she goes back to them or if she should keep her appointment with them for this morning. Please advise patient regarding this as soon as possible at [phone].

## 2018-07-30 NOTE — PROGRESS NOTES
Patient dropped off [facility] forms at the [facility] location. Please call for  when complete.

## 2018-07-31 NOTE — TELEPHONE ENCOUNTER
Message printed and given to Kimberly and Margie in the [facility] to have patient follow up as scheduled tomorrow, 8-1-18.

## 2018-07-31 NOTE — TELEPHONE ENCOUNTER
Patient has an appointment with Dr. Gilford Jesus tomorrow.     [address] Alva PA-C  7/31/2018   10:32 AM

## 2018-08-01 ENCOUNTER — OFFICE VISIT (OUTPATIENT)
Dept: ORTHOPEDIC SURGERY | Age: 63
End: 2018-08-01

## 2018-08-01 ENCOUNTER — DOCUMENTATION ONLY (OUTPATIENT)
Dept: ORTHOPEDIC SURGERY | Age: 63
End: 2018-08-01

## 2018-08-01 VITALS
WEIGHT: 218 LBS | DIASTOLIC BLOOD PRESSURE: 65 MMHG | HEIGHT: 63 IN | HEART RATE: 86 BPM | BODY MASS INDEX: 38.62 KG/M2 | SYSTOLIC BLOOD PRESSURE: 144 MMHG | RESPIRATION RATE: 16 BRPM | OXYGEN SATURATION: 95 % | TEMPERATURE: 98.6 F

## 2018-08-01 DIAGNOSIS — M76.71 PERONEAL TENDINITIS OF RIGHT LOWER EXTREMITY: Primary | ICD-10-CM

## 2018-08-01 DIAGNOSIS — S92.324G CLOSED NONDISPLACED FRACTURE OF SECOND METATARSAL BONE OF RIGHT FOOT WITH DELAYED HEALING, SUBSEQUENT ENCOUNTER: ICD-10-CM

## 2018-08-01 NOTE — MR AVS SNAPSHOT
[address] [place] [address], [address] 200 [facility] 
[phone] Patient: Christa Layne MRN: [id number] OVN:[id number]/3354 Visit Information Date & Time Provider Department Dept. Phone Encounter #  
 8/1/2018  3:20 PM Vazquez Rosalie, 27 [facility] [phone] [id number] Your Appointments 11/20/2018  2:00 PM  
PHYSICAL with Alex Aldrich MD  
[facility] of [address] [place] [address] [facility] Appt Note: pe rd labs at lab [address] [facility], [address] Corinne Boy [address] [place]  
  
   
 [address], [address] Upcoming Health Maintenance Date Due Hepatitis C Screening 1955 Influenza Age 5 to Adult 8/1/2018 BREAST CANCER SCRN MAMMOGRAM 11/13/2019 COLONOSCOPY 12/26/2022 DTaP/Tdap/Td series (2 - Td) 8/31/2026 Allergies as of 8/1/2018  Review Complete On: 8/1/2018 By: Rosa Hwang Severity Noted Reaction Type Reactions Advair Diskus [Fluticasone-salmeterol]   Intolerance Other (comments) Hoarseness Naproxen    Unable to Obtain Pcn [Penicillins]    Rash Current Immunizations  Reviewed on 8/28/2012 Name Date  
 TD Vaccine 1/1/1990 Not reviewed this visit You Were Diagnosed With   
  
 Codes Comments Peroneal tendinitis of right lower extremity    -  Primary ICD-10-CM: M76.71 
ICD-9-CM: 726.79 Closed nondisplaced fracture of second metatarsal bone of right foot with delayed healing, subsequent encounter     ICD-10-CM: S92.324G ICD-9-CM: V54.19 Vitals BP Pulse Temp Resp Height(growth percentile) Weight(growth percentile) 144/65 86 98.6 °F (37 °C) (Oral) 16 5' 3\" (1.6 m) 218 lb (98.9 kg) SpO2 BMI OB Status Smoking Status 95% 38.62 kg/m2 Hysterectomy Former Smoker BMI and BSA Data  Body Mass Index Body Surface Area  
 38.62 kg/m 2 2.1 m 2  
 Preferred Pharmacy Pharmacy Name Phone [address], [address] [facility] [phone] Your Updated Medication List  
  
   
This list is accurate as of 8/1/18  3:37 PM.  Always use your most recent med list.  
  
  
  
  
 albuterol 90 mcg/actuation inhaler Commonly known as:  PROVENTIL HFA, VENTOLIN HFA, PROAIR HFA Take 2 Puffs by inhalation every four (4) hours as needed. beclomethasone 80 mcg/actuation [employer] Commonly known as:  QVAR Take 1 Puff by inhalation two (2) times a day. diclofenac 1 % Gel Commonly known as:  VOLTAREN Apply  to affected area four (4) times daily. USE AS DIRECTED  
  
 ergocalciferol 50,000 unit capsule Commonly known as:  VITAMIN D2 Take 1 Cap by mouth every seven (7) days. lidocaine-EPINEPHrine 1 %-1:100,000 injection Commonly known as:  XYLOCAINE 10 mL by IntraDERMal route once for 1 dose. Indications: ADMINISTRATION OF LOCAL ANESTHESIA  
  
 magnesium oxide 400 mg tablet Commonly known as:  MAG-OX Take 400 mg by mouth daily. multivitamin tablet Commonly known as:  ONE A DAY Take 1 Tab by mouth daily. potassium chloride 10 mEq tablet Commonly known as:  KLOR-CON Take 1 Tab by mouth daily. VITAMIN C 250 mg tablet Generic drug:  ascorbic acid (vitamin C) Take  by mouth. We Performed the Following AMB SUPPLY ORDER [[id number] Custom] Comments:  
 Order Date: 8/1/2018 Bone Stimulator To-Do List   
 08/01/2018 Lab:  METABOLIC PANEL, COMPREHENSIVE   
  
 08/01/2018 Lab:  VITAMIN D, 25 HYROXY PANEL Introducing [facility]! [employer] introduces Mlog patient portal. Now you can access parts of your medical record, email your doctor's office, and request medication refills online. 1. In your internet browser, go to [email or web address] 2. Click on the First Time User? Click Here link in the Sign In box. You will see the New Member Sign Up page. 3. Enter your TÃ¡ximo Access Code exactly as it appears below. You will not need to use this code after youve completed the sign-up process. If you do not sign up before the expiration date, you must request a new code. · TÃ¡ximo Access Code: [email or web address] Expires: 10/16/2018  4:29 PM 
 
4. Enter the last four digits of your Social Security Number (xxxx) and Date of Birth (mm/dd/yyyy) as indicated and click Submit. You will be taken to the next sign-up page. 5. Create a TÃ¡ximo ID. This will be your TÃ¡ximo login ID and cannot be changed, so think of one that is secure and easy to remember. 6. Create a TÃ¡ximo password. You can change your password at any time. 7. Enter your Password Reset Question and Answer. This can be used at a later time if you forget your password. 8. Enter your e-mail address. You will receive e-mail notification when new information is available in [address] 9. Click Sign Up. You can now view and download portions of your medical record. 10. Click the Download Summary menu link to download a portable copy of your medical information. If you have questions, please visit the Frequently Asked Questions section of the TÃ¡ximo website. Remember, TÃ¡ximo is NOT to be used for urgent needs. For medical emergencies, dial 911. Now available from your iPhone and Android! Please provide this summary of care documentation to your next provider. Your primary care clinician is listed as Sherwin Serrano. If you have any questions after today's visit, please call [phone].

## 2018-08-01 NOTE — LETTER
NOTIFICATION RETURN TO WORK / SCHOOL 
 
8/1/2018 3:46 PM 
 
Ms. Jeremy Sifuentes [address] 
[address] To Whom It May Concern: 
 
Jeremy Sifuentes is currently under the care of [address] [place], [place] [address]armando Gonzalez. She will return to work on a light duty status, sedentary work only for the next 4 weeks. If there are questions or concerns please have the patient contact our office.  
 
 
 
Sincerely, 
 
 
Driss Patino MD

## 2018-08-01 NOTE — LETTER
NOTIFICATION RETURN TO WORK / SCHOOL 
 
8/1/2018 3:35 PM 
 
Ms. Jose Angel [address] 
[address] To Whom It May Concern: 
 
Jose Angel Barber is currently under the care of [address] [place] [address] Guy Gonzalez. Please allow her to return to work on limited standing or walking. Please allow her to work as a  if available. If there are questions or concerns please have the patient contact our office.  
 
 
 
Sincerely, 
 
 
Carolina Hernández MD

## 2018-08-06 ENCOUNTER — TELEPHONE (OUTPATIENT)
Dept: ORTHOPEDIC SURGERY | Age: 63
End: 2018-08-06

## 2018-08-06 NOTE — TELEPHONE ENCOUNTER
Kaylynn Musa with [facility] [phone] needs an order for orthotics faxed.     Fax# [phone]    F/U 9/5/18  Last F/U 8/1/18

## 2018-08-14 ENCOUNTER — TELEPHONE (OUTPATIENT)
Dept: ORTHOPEDIC SURGERY | Age: 63
End: 2018-08-14

## 2018-08-14 NOTE — TELEPHONE ENCOUNTER
Patient called stating someone from our office tried to call her but she doesn't know why. There is no documentation in her chart. Please advise patient back if needed at [phone].

## 2018-08-28 DIAGNOSIS — M76.71 PERONEAL TENDINITIS OF RIGHT LOWER EXTREMITY: ICD-10-CM

## 2018-08-28 DIAGNOSIS — S92.324G CLOSED NONDISPLACED FRACTURE OF SECOND METATARSAL BONE OF RIGHT FOOT WITH DELAYED HEALING, SUBSEQUENT ENCOUNTER: ICD-10-CM

## 2018-08-30 ENCOUNTER — TELEPHONE (OUTPATIENT)
Dept: ORTHOPEDIC SURGERY | Age: 63
End: 2018-08-30

## 2018-08-30 NOTE — TELEPHONE ENCOUNTER
[facility] called stating they need an updated script and signature for the patient's right custom SMO or AFO orthotic originally entered on 07/18/18. Please fax the order to [phone].

## 2018-09-11 ENCOUNTER — OFFICE VISIT (OUTPATIENT)
Dept: ORTHOPEDIC SURGERY | Age: 63
End: 2018-09-11

## 2018-09-11 VITALS
DIASTOLIC BLOOD PRESSURE: 76 MMHG | HEART RATE: 83 BPM | WEIGHT: 216.4 LBS | TEMPERATURE: 98.6 F | HEIGHT: 63 IN | BODY MASS INDEX: 38.34 KG/M2 | RESPIRATION RATE: 16 BRPM | OXYGEN SATURATION: 95 % | SYSTOLIC BLOOD PRESSURE: 150 MMHG

## 2018-09-11 DIAGNOSIS — M79.671 RIGHT FOOT PAIN: ICD-10-CM

## 2018-09-11 DIAGNOSIS — S92.324D CLOSED NONDISPLACED FRACTURE OF SECOND METATARSAL BONE OF RIGHT FOOT WITH ROUTINE HEALING, SUBSEQUENT ENCOUNTER: Primary | ICD-10-CM

## 2018-09-11 NOTE — PROCEDURES
FOOT X RAYS 3 VIEWS Right   9/11/2018    NON WEIGHT BEARING    X RAYS AT [address] [place] [address]  9/11/2018    {Right FOOT:     Bones: 2nd metatarsal shaft fx present // fractures or dislocations. No focal osteolytic or osteoblastic process  Bone Spurs Superior >> Inferior Bone Spurs   Alignment: Deformity Location: None present, Valgus Hindfoot  Joint Condition: Moderate OA changes present   JOINT SPACE NARROWING AND OA AT 2 and 3 TMT JOINT SPACES   Soft Tissues Mild swelling dorsal midfoot   No ankle joint effusion in lateral projection. Mineralization: suggests Osteopenia    I have personally reviewed the results of the above study.  The interpretation of this study is my professional opinion

## 2018-09-11 NOTE — MR AVS SNAPSHOT
[address], [address] 200 [facility] 
[phone] Patient: Christa Layne MRN: [id number] PRC:5/0/[birth year]29 Visit Information Date & Time Provider Department Dept. Phone Encounter #  
 9/11/2018  2:50 PM Vazquez Rosalie, 27 [facility] [phone] [id number] Your Appointments 11/20/2018  2:00 PM  
PHYSICAL with Alex Aldrich MD  
[facility]ists of [address] [place] [address] [facility] pe rd labs at lab [address], [address] [id number] [address] [place]  
  
   
 [address], [address] Upcoming Health Maintenance Date Due Hepatitis C Screening 1955 Influenza Age 5 to Adult 8/1/2018 BREAST CANCER SCRN MAMMOGRAM 11/13/2019 COLONOSCOPY 12/26/2022 DTaP/Tdap/Td series (2 - Td) 8/31/2026 Allergies as of 9/11/2018  Review Complete On: 9/11/2018 By: Rosa Hwang Severity Noted Reaction Type Reactions Advair Diskus [Fluticasone-salmeterol]   Intolerance Other (comments) Hoarseness Naproxen    Unable to Obtain Pcn [Penicillins]    Rash Current Immunizations  Reviewed on 8/28/2012 Name Date  
 TD Vaccine 1/1/1990 Not reviewed this visit You Were Diagnosed With   
  
 Codes Comments Closed nondisplaced fracture of second metatarsal bone of right foot with routine healing, subsequent encounter    -  Primary ICD-10-CM: A78.976L ICD-9-CM: V54.19 Right foot pain     ICD-10-CM: M79.671 ICD-9-CM: 729.5 Vitals BP Pulse Temp Resp Height(growth percentile) Weight(growth percentile) 150/76 83 98.6 °F (37 °C) 16 5' 3\" (1.6 m) 216 lb 6.4 oz (98.2 kg) SpO2 BMI OB Status Smoking Status 95% 38.33 kg/m2 Hysterectomy Former Smoker BMI and BSA Data  Body Mass Index Body Surface Area  
 38.33 kg/m 2 2.09 m 2  
  
  
 Preferred Pharmacy Pharmacy Name Phone Mohamud Yoder [address] [place], [address] [facility] [phone] Your Updated Medication List  
  
   
This list is accurate as of 9/11/18  4:14 PM.  Always use your most recent med list.  
  
  
  
  
 albuterol 90 mcg/actuation inhaler Commonly known as:  PROVENTIL HFA, VENTOLIN HFA, PROAIR HFA Take 2 Puffs by inhalation every four (4) hours as needed. beclomethasone 80 mcg/actuation [employer] Commonly known as:  QVAR Take 1 Puff by inhalation two (2) times a day. diclofenac 1 % Gel Commonly known as:  VOLTAREN Apply  to affected area four (4) times daily. USE AS DIRECTED  
  
 ergocalciferol 50,000 unit capsule Commonly known as:  VITAMIN D2 Take 1 Cap by mouth every seven (7) days. lidocaine-EPINEPHrine 1 %-1:100,000 injection Commonly known as:  XYLOCAINE 10 mL by IntraDERMal route once for 1 dose. Indications: ADMINISTRATION OF LOCAL ANESTHESIA  
  
 magnesium oxide 400 mg tablet Commonly known as:  MAG-OX Take 400 mg by mouth daily. multivitamin tablet Commonly known as:  ONE A DAY Take 1 Tab by mouth daily. potassium chloride 10 mEq tablet Commonly known as:  KLOR-CON Take 1 Tab by mouth daily. VITAMIN C 250 mg tablet Generic drug:  ascorbic acid (vitamin C) Take  by mouth. We Performed the Following AMB POC XRAY, FOOT; COMPLETE, 3+ VIEW [39346 CPT(R)] Patient Instructions Please follow up in 5 weeks. You are advised to contact us if your condition worsens. Foot Pain: Care Instructions Your Care Instructions Foot injuries that cause pain and swelling are fairly common. Almost all sports or home repair projects can cause a misstep that ends up as foot pain. Normal wear and tear, especially as you get older, also can cause foot pain.  
Most minor foot injuries will heal on their own, and home treatment is usually all you need to do. If you have a severe injury, you may need tests and treatment. Follow-up care is a key part of your treatment and safety. Be sure to make and go to all appointments, and call your doctor if you are having problems. It's also a good idea to know your test results and keep a list of the medicines you take. How can you care for yourself at home? · Take pain medicines exactly as directed. ¨ If the doctor gave you a prescription medicine for pain, take it as prescribed. ¨ If you are not taking a prescription pain medicine, ask your doctor if you can take an over-the-counter medicine. · Rest and protect your foot. Take a break from any activity that may cause pain. · Put ice or a cold pack on your foot for 10 to 20 minutes at a time. Put a thin cloth between the ice and your skin. · Prop up the sore foot on a pillow when you ice it or anytime you sit or lie down during the next 3 days. Try to keep it above the level of your heart. This will help reduce swelling. · Your doctor may recommend that you wrap your foot with an elastic bandage. Keep your foot wrapped for as long as your doctor advises. · If your doctor recommends crutches, use them as directed. · Wear roomy footwear. · As soon as pain and swelling end, begin gentle exercises of your foot. Your doctor can tell you which exercises will help. When should you call for help? Call 911 anytime you think you may need emergency care. For example, call if: 
  · Your foot turns pale, white, blue, or cold.  
 Call your doctor now or seek immediate medical care if: 
  · You cannot move or stand on your foot.  
  · Your foot looks twisted or out of its normal position.  
  · Your foot is not stable when you step down.  
  · You have signs of infection, such as: 
¨ Increased pain, swelling, warmth, or redness. ¨ Red streaks leading from the sore area. ¨ Pus draining from a place on your foot. ¨ A fever.   · Your foot is numb or tingly.  
 Watch closely for changes in your health, and be sure to contact your doctor if: 
  · You do not get better as expected.  
  · You have bruises from an injury that last longer than 2 weeks. Where can you learn more? Go to http://nilo-cindy.info/. Enter S073 in the search box to learn more about \"Foot Pain: Care Instructions. \" Current as of: November 29, 2017 Content Version: 11.7 © 1210-5963 8bit. Care instructions adapted under license by [employer] (which disclaims liability or warranty for this information). If you have questions about a medical condition or this instruction, always ask your healthcare professional. [address] any warranty or liability for your use of this information. Introducing [facility]! Lottie Solis introduces ZapHour patient portal. Now you can access parts of your medical record, email your doctor's office, and request medication refills online. 1. In your internet browser, go to [email or web address] 2. Click on the First Time User? Click Here link in the Sign In box. You will see the New Member Sign Up page. 3. Enter your ZapHour Access Code exactly as it appears below. You will not need to use this code after youve completed the sign-up process. If you do not sign up before the expiration date, you must request a new code. · ZapHour Access Code: [email or web address] Expires: 10/16/2018  4:29 PM 
 
4. Enter the last four digits of your Social Security Number (xxxx) and Date of Birth (mm/dd/yyyy) as indicated and click Submit. You will be taken to the next sign-up page. 5. Create a ZapHour ID. This will be your ZapHour login ID and cannot be changed, so think of one that is secure and easy to remember. 6. Create a ZapHour password. You can change your password at any time. 7. Enter your Password Reset Question and Answer. This can be used at a later time if you forget your password. 8. Enter your e-mail address. You will receive e-mail notification when new information is available in [address] 9. Click Sign Up. You can now view and download portions of your medical record. 10. Click the Download Summary menu link to download a portable copy of your medical information. If you have questions, please visit the Frequently Asked Questions section of the Posterous website. Remember, Posterous is NOT to be used for urgent needs. For medical emergencies, dial 911. Now available from your iPhone and Android! Please provide this summary of care documentation to your next provider. Your primary care clinician is listed as Nemiah Snellen. If you have any questions after today's visit, please call [phone].

## 2018-09-11 NOTE — PATIENT INSTRUCTIONS
Please follow up in 5 weeks. You are advised to contact us if your condition worsens. Foot Pain: Care Instructions Your Care Instructions Foot injuries that cause pain and swelling are fairly common. Almost all sports or home repair projects can cause a misstep that ends up as foot pain. Normal wear and tear, especially as you get older, also can cause foot pain. Most minor foot injuries will heal on their own, and home treatment is usually all you need to do. If you have a severe injury, you may need tests and treatment. Follow-up care is a key part of your treatment and safety. Be sure to make and go to all appointments, and call your doctor if you are having problems. It's also a good idea to know your test results and keep a list of the medicines you take. How can you care for yourself at home? · Take pain medicines exactly as directed. ¨ If the doctor gave you a prescription medicine for pain, take it as prescribed. ¨ If you are not taking a prescription pain medicine, ask your doctor if you can take an over-the-counter medicine. · Rest and protect your foot. Take a break from any activity that may cause pain. · Put ice or a cold pack on your foot for 10 to 20 minutes at a time. Put a thin cloth between the ice and your skin. · Prop up the sore foot on a pillow when you ice it or anytime you sit or lie down during the next 3 days. Try to keep it above the level of your heart. This will help reduce swelling. · Your doctor may recommend that you wrap your foot with an elastic bandage. Keep your foot wrapped for as long as your doctor advises. · If your doctor recommends crutches, use them as directed. · Wear roomy footwear. · As soon as pain and swelling end, begin gentle exercises of your foot. Your doctor can tell you which exercises will help. When should you call for help? Call 911 anytime you think you may need emergency care. For example, call if:   · Your foot turns pale, white, blue, or cold.  
 Call your doctor now or seek immediate medical care if: 
  · You cannot move or stand on your foot.  
  · Your foot looks twisted or out of its normal position.  
  · Your foot is not stable when you step down.  
  · You have signs of infection, such as: 
¨ Increased pain, swelling, warmth, or redness. ¨ Red streaks leading from the sore area. ¨ Pus draining from a place on your foot. ¨ A fever.  
  · Your foot is numb or tingly.  
 Watch closely for changes in your health, and be sure to contact your doctor if: 
  · You do not get better as expected.  
  · You have bruises from an injury that last longer than 2 weeks. Where can you learn more? Go to http://nilo-cindy.info/. Enter O804 in the search box to learn more about \"Foot Pain: Care Instructions. \" Current as of: November 29, 2017 Content Version: 11.7 © 4181-2378 iWelcome, [employer]. Care instructions adapted under license by [employer] (which disclaims liability or warranty for this information). If you have questions about a medical condition or this instruction, always ask your healthcare professional. [place] [address] any warranty or liability for your use of this information.

## 2018-09-11 NOTE — PROGRESS NOTES
AMBULATORY PROGRESS NOTE Patient: Sofya Salmon             MRN: [id number]     SSN: [id number] Body mass index is 38.33 kg/(m^2). YOB: 1955     AGE: 58 y.o. EX: female PCP: Betty Larose MD 
 
IMPRESSION/DIAGNOSIS AND TREATMENT PLAN  
 
DIAGNOSES 1. Closed nondisplaced fracture of second metatarsal bone of right foot with routine healing, subsequent encounter 2. Right foot pain Orders Placed This Encounter  [53968] Foot Min 3V Sofya Salmon understands her diagnoses and the proposed plan. Plan: 
 
1) Continue using the extended [facility] brace. 2) Continue using the bone stimulator as directed. RTO - 5 weeks HPI AND EXAMINATION Sofya Salmon IS A 58 y.o. female who presents to my outpatient office for follow up of a closed nondisplaced fracture of the second metatarsal bone of the right foot, peroneal tendinitis of the RLE. At last visit, I provided an order for a bone stimulator for the second metatarsal base, gave an order for a brace, ordered lab tests, and gave a work note. Since we saw her last, Ms. Ridge Schwartz has obtained the bone stimulator as previously instructed. Patient reports she continues to experience pain along the dorsal and lateral aspect of her right foot. She has obtained the [facility] as well and is using them in supportive tennis shoes. At this time, we will recommend that she continues using the bone stimulator and the [facility] brace. Right ANKLE and FOOT    
   
Gait: slow Tenderness: mild tenderness to second metatarsal. 
  Mild tenderness on midfoot stress. Cutaneous: No rashes, skin patches, wounds, or abrasions to the lower legs                               Warm and Normal color.  No regions of expressible drainage. 
                              Medial Border of Tibia Region: absent 
                              Skin color, texture, turgor normal. Normal. 
                               DHPD EFYUUOYD from forefoot to midfoot. Joint Motion: ROM Ankle:Normal , Hindfoot: (ST,TN,CC Normal}, Forefoot toes:Decreased Neurologic Exam: Neuro: Motor: normal 5/5 strength in all tested muscle groups and Sensory : no sensory deficits noted. Contractures: Gastrocnemius or Achilles Contractures absent Joint / Tendon Stability: No Ankle or Subtalar instability or joint laxity.                                                               No peroneal sublux ability or dislocation Alignment: Slight Pes Planus Vascular: Normal Pulses/ NL Capillary refill, No evidence of DVT seen on physical exam. 
                      No calf swelling, no tenderness to calf muscles. Lymphatic:  No Evidence of Lymphedema. CHART REVIEW Past Medical History:  
Diagnosis Date  Asthma Dr. Margareth Patelbish  Cancer ([facility] Lung Cancer s/p right lobectomy Dr Catalino Chavez 10/06  [facility]   
 and right lung nodules, stable on serial CT last 6/14  Colon adenoma 2009 Dr. Luis Antonio Cade; adenoma and diverticulosis 9/12 Dr. Tika Milligan; 12/26/17  FHx: breast cancer  Glaucoma Dr Loco Guido  Hyperlipidemia 5/14/2018  Hypovitaminosis D 2011  IFG (impaired fasting glucose)  Morbid obesity ([facility] peak weight 209 lbs, bmi 37 from 2/14  PUD (peptic ulcer disease)   
 h pylori tx'ed  Sleep apnea   
 on CPAP, Dr. Justina Underwood 2010  Venous insufficiency Current Outpatient Prescriptions Medication Sig  
 diclofenac (VOLTAREN) 1 % gel Apply  to affected area four (4) times daily. USE AS DIRECTED  ergocalciferol (VITAMIN D2) 50,000 unit capsule Take 1 Cap by mouth every seven (7) days.  potassium chloride (K-DUR, KLOR-CON) 10 mEq tablet Take 1 Tab by mouth daily.  magnesium oxide (MAG-OX) 400 mg tablet Take 400 mg by mouth daily.  ascorbic acid (VITAMIN C) 250 mg tablet Take  by mouth.  beclomethasone (QVAR) 80 mcg/actuation inhaler Take 1 Puff by inhalation two (2) times a day.  albuterol (PROVENTIL HFA, VENTOLIN HFA) 90 mcg/actuation inhaler Take 2 Puffs by inhalation every four (4) hours as needed.  multivitamin (ONE A DAY) tablet Take 1 Tab by mouth daily.  lidocaine-EPINEPHrine (XYLOCAINE) 1 %-1:100,000 injection 10 mL by IntraDERMal route once for 1 dose. Indications: ADMINISTRATION OF LOCAL ANESTHESIA No current facility-administered medications for this visit. Allergies Allergen Reactions  Advair Diskus [Fluticasone-Salmeterol] Other (comments) Hoarseness  Naproxen Unable to Obtain  Pcn [Penicillins] Rash Past Surgical History:  
Procedure Laterality Date  CARDIAC SURG PROCEDURE UNLIST  11/07  
 thallium negative  CARDIAC SURG PROCEDURE UNLIST  12/2017  
 echo showed nl lv, ef 65%, gr 1 dd; Dr Caryle Senegal  CARDIAC SURG PROCEDURE UNLIST  09/2016  
 ex stress echo without clear wma Dr Jp Greco  CT HEAD W CONT  11/07  
 negative  EGD    
 PUD on EGD, H pylori +txed 2009.  HX APPENDECTOMY  1980s  HX CATARACT REMOVAL Dr Rosalia Pavon  HX COLONOSCOPY Dr. Jonnie Bhatia 2009 adenoma; Dr Leann Chou 9/12  adenoma and tics; 12/26/17 sessile serrated and tubular adenomas and tics  HX HEENT    
 thyroid US negative 6/14 [address]  
 for fibroids  HX LOBECTOMY for cancer, Dr. Armando Ochoa 10/06. No adjuvant tx. She has another spot on the same side that is being followed every three months.  HX ORTHOPAEDIC Left foot ([place]ana) and knee surgery x2 Dr. Nora Gaytan. Right foot fx 4/18 Dr Cameron Colunga [address] [facility] [place]  6/11  
 negative Social History Occupational History  [employer]  Social History Main Topics  Smoking status: Former Smoker Packs/day: 0.50 Years: 20.00 Types: Cigarettes Quit date: 1/1/1990  Smokeless tobacco: Never Used  Alcohol use No  
  Drug use: No  
 Sexual activity: Not Currently Family History Problem Relation Age of Onset  Breast Cancer Mother  Other Brother SLE  
 Other Brother SLE  
 Other Brother SLE  Hypertension Father REVIEW OF SYSTEMS : 9/11/2018  ALL BELOW ARE Negative except : SEE HPI Constitutional: Negative for fever, chills and weight loss. Neg Weigh Loss Cardiovascular: Negative for chest pain, claudication and leg swelling. SOB, RAHMAN Gastrointestinal: Negative for  pain, N/V/D/C, Blood in stool or urine,dysuria, hematuria, Incontinence, pelvic pain Musculoskeletal: see HPI. Neurological: Negative for dizziness and weakness. Negative for headaches,Visual Changes, Confusion, Seizures, Psychiatric/Behavioral: Negative for depression, memory loss and substance abuse. Extremities:  Negative for  hair changes, rash or skin lesion changes. Hematologic: Negative for Bleeding problems, bruising, pallor or swollen lymph nodes. Peripheral Vascular: No calf pain, vascular vein tenderness to calf pain No calf throbbing, posterior knee throbbing pain DIAGNOSTIC IMAGING  
 
FOOT X RAYS 3 VIEWS Right 9/11/2018 NON WEIGHT BEARING 
 
X RAYS AT [address] [place] [address] 
9/11/2018 { Right FOOT:  
 
Bones: 2nd metatarsal shaft fx present // fractures or dislocations. No focal osteolytic or osteoblastic process Bone Spurs Superior >> Inferior Bone Spurs Alignment: Deformity Location: None present, Valgus Hindfoot Joint Condition: Moderate OA changes present JOINT SPACE NARROWING AND OA AT 2 and 3 TMT JOINT SPACES Soft Tissues Mild swelling dorsal midfoot No ankle joint effusion in lateral projection. Mineralization: suggests Osteopenia I have personally reviewed the results of the above study. The interpretation of this study is my professional opinion Written by Cruz Brooks, as dictated by Dr. Champagne Captain.  I, Ria Spears confirm that all documentation is accurate.

## 2018-10-16 ENCOUNTER — OFFICE VISIT (OUTPATIENT)
Dept: ORTHOPEDIC SURGERY | Age: 63
End: 2018-10-16

## 2018-10-16 VITALS
DIASTOLIC BLOOD PRESSURE: 60 MMHG | OXYGEN SATURATION: 97 % | HEART RATE: 88 BPM | RESPIRATION RATE: 16 BRPM | SYSTOLIC BLOOD PRESSURE: 131 MMHG | BODY MASS INDEX: 38.73 KG/M2 | HEIGHT: 63 IN | TEMPERATURE: 97.9 F | WEIGHT: 218.6 LBS

## 2018-10-16 DIAGNOSIS — S92.324D CLOSED NONDISPLACED FRACTURE OF SECOND METATARSAL BONE OF RIGHT FOOT WITH ROUTINE HEALING, SUBSEQUENT ENCOUNTER: ICD-10-CM

## 2018-10-16 DIAGNOSIS — M79.671 RIGHT FOOT PAIN: Primary | ICD-10-CM

## 2018-10-16 NOTE — PROGRESS NOTES
AMBULATORY PROGRESS NOTE Patient: Sandra Schafer             MRN: [id number]     SSN: [id number] Body mass index is 38.72 kg/(m^2). YOB: 1955     AGE: 58 y.o. EX: female PCP: Lynette Barrios MD 
 
 IMPRESSION/DIAGNOSIS AND TREATMENT PLAN  
 
DIAGNOSES 1. Right foot pain Orders Placed This Encounter  [[id number]] Foot Min 3V Sandra Schafer understands her diagnoses and the proposed plan. Plan: 
 
1) 
2) RTO - 
 HPI AND EXAMINATION Sandra Schafer IS A 58 y.o. female who presents to my outpatient office for follow up of a closed nondisplaced fracture of the second metatarsal bone of the right foot, peroneal tendinitis of the RLE. At last visit, I instructed the patient to continue using the [facility] brace and the bone stimulator as directed. Since we saw her last, *** Right ANKLE and FOOT    
   
Gait: slow Tenderness: mild tenderness to second metatarsal. 
                                            Mild tenderness on midfoot stress. Cutaneous: No rashes, skin patches, wounds, or abrasions to the lower legs                               Warm and Normal color. No regions of expressible drainage. 
                              Medial Border of Tibia Region: absent 
                              Skin color, texture, turgor normal. Normal. 
                              Mild swelling from forefoot to midfoot. Joint Motion: ROM Ankle:Normal , Hindfoot: (ST,TN,CC Normal}, Forefoot toes:Decreased Neurologic Exam: Neuro: Motor: normal 5/5 strength in all tested muscle groups and Sensory : no sensory deficits noted. Contractures: Gastrocnemius or Achilles Contractures absent Joint / Tendon Stability: No Ankle or Subtalar instability or joint laxity.                                                               No peroneal sublux ability or dislocation Alignment: Slight Pes Planus Vascular: Normal Pulses/ NL Capillary refill, No evidence of DVT seen on physical exam. 
                      No calf swelling, no tenderness to calf muscles. Lymphatic:  No Evidence of Lymphedema. CHART REVIEW Past Medical History:  
Diagnosis Date  Asthma Dr. Evans Lighter  Cancer ([facility] Lung Cancer s/p right lobectomy Dr Virgilio Sampson 10/06  [facility]   
 and right lung nodules, stable on serial CT last 6/14  Colon adenoma 2009 Dr. Diana Spencer; adenoma and diverticulosis 9/12 Dr. Anthony Ramsey; 12/26/17  FHx: breast cancer  Glaucoma Dr Rocio Hoover  Hyperlipidemia 5/14/2018  Hypovitaminosis D 2011  IFG (impaired fasting glucose)  Morbid obesity ([facility] peak weight 209 lbs, bmi 37 from 2/14  PUD (peptic ulcer disease)   
 h pylori tx'ed  Sleep apnea   
 on CPAP, Dr. Jaden Cristobal 2010  Venous insufficiency Current Outpatient Prescriptions Medication Sig  ergocalciferol (VITAMIN D2) 50,000 unit capsule Take 1 Cap by mouth every seven (7) days.  potassium chloride (K-DUR, KLOR-CON) 10 mEq tablet Take 1 Tab by mouth daily.  magnesium oxide (MAG-OX) 400 mg tablet Take 400 mg by mouth daily.  ascorbic acid (VITAMIN C) 250 mg tablet Take  by mouth.  beclomethasone (QVAR) 80 mcg/actuation inhaler Take 1 Puff by inhalation two (2) times a day.  albuterol (PROVENTIL HFA, VENTOLIN HFA) 90 mcg/actuation inhaler Take 2 Puffs by inhalation every four (4) hours as needed.  multivitamin (ONE A DAY) tablet Take 1 Tab by mouth daily.  diclofenac (VOLTAREN) 1 % gel Apply  to affected area four (4) times daily. USE AS DIRECTED  lidocaine-EPINEPHrine (XYLOCAINE) 1 %-1:100,000 injection 10 mL by IntraDERMal route once for 1 dose. Indications: ADMINISTRATION OF LOCAL ANESTHESIA No current facility-administered medications for this visit. Allergies Allergen Reactions  Advair Diskus [Fluticasone-Salmeterol] Other (comments) Hoarseness  Naproxen Unable to Obtain  Pcn [Penicillins] Rash Past Surgical History:  
Procedure Laterality Date  CARDIAC SURG PROCEDURE UNLIST  11/07  
 thallium negative  CARDIAC SURG PROCEDURE UNLIST  12/2017  
 echo showed nl lv, ef 65%, gr 1 dd; Dr Lai James  CARDIAC SURG PROCEDURE UNLIST  09/2016  
 ex stress echo without clear wma Dr Nikky Arias  CT HEAD W CONT  11/07  
 negative  EGD    
 PUD on EGD, H pylori +txed 2009.  HX APPENDECTOMY  1980s  HX CATARACT REMOVAL Dr Segun Cook  HX COLONOSCOPY Dr. Asad Hollingsworth 2009 adenoma; Dr Yael Mulligan 9/12  adenoma and tics; 12/26/17 sessile serrated and tubular adenomas and tics  HX HEENT    
 thyroid US negative 6/14 [address]  
 for fibroids  HX LOBECTOMY for cancer, Dr. Gillian Pennington 10/06. No adjuvant tx. She has another spot on the same side that is being followed every three months.  HX ORTHOPAEDIC Left foot ([place]) and knee surgery x2 Dr. Bird Hagen. Right foot fx 4/18 Dr Jr Silva [address] [facility]  6/11  
 negative Social History Occupational History  [employer]  Social History Main Topics  Smoking status: Former Smoker Packs/day: 0.50 Years: 20.00 Types: Cigarettes Quit date: 1/1/1990  Smokeless tobacco: Never Used  Alcohol use No  
 Drug use: No  
 Sexual activity: Not Currently Family History Problem Relation Age of Onset  Breast Cancer Mother  Other Brother SLE  
 Other Brother SLE  
 Other Brother SLE  Hypertension Father REVIEW OF SYSTEMS : 10/16/2018  ALL BELOW ARE Negative except : SEE HPI Constitutional: Negative for fever, chills and weight loss. Neg Weight Loss Cardiovascular: Negative for chest pain, claudication and leg swelling.  SOB, RAHMAN  
 Gastrointestinal/Urological: Negative for  pain, N/V/D/C, Blood in stool or urine,dysuria                         Hematuria, Incontinence, pelvic pain Musculoskeletal: see HPI. Neurological: Negative for dizziness and weakness, headaches,Visual Changes             Confusion,  Or Seizures, Psychiatric/Behavioral: Negative for depression, memory loss and substance abuse. Extremities:  Negative for hair changes, rash or skin lesion changes. Hematologic: Negative for Bleeding problems, bruising, pallor or swollen lymph nodes. Peripheral Vascular: No calf pain, vascular vein tenderness to calf pain No calf throbbing, posterior knee throbbing pain DIAGNOSTIC IMAGING No notes on file Please see above section of this report. I have personally reviewed the results of the above study. The interpretation of this study is my professional opinion. Written by Aung Ledezma, as dictated by Dr. Emmet Bloch. I, Dr. Emmet Bloch, confirm that all documentation is accurate.

## 2018-10-16 NOTE — PATIENT INSTRUCTIONS
Continue wearing custom SMO brace, right  Temporary Handicap placard application provided  Please follow up in 6 weeks         Broken Foot: Care Instructions  Your Care Instructions    A broken foot, or foot fracture, is a break in one or more of the bones in your foot. It may happen because of a sports injury, a fall, or other accident. A compound, or open, fracture occurs when a bone breaks through the skin. A break that does not poke through the skin is a closed fracture. Your treatment depends on the location and type of break in your foot. You may need a splint, a cast, or an orthopedic shoe. Certain kinds of injuries may need surgery at some time. Whatever your treatment, you can ease symptoms and help your foot heal with care at home. You may need 6 to 8 weeks or more to fully heal.  You heal best when you take good care of yourself. Eat a variety of healthy foods, and don't smoke. Follow-up care is a key part of your treatment and safety. Be sure to make and go to all appointments, and call your doctor if you are having problems. It's also a good idea to know your test results and keep a list of the medicines you take. How can you care for yourself at home? · Be safe with medicines. Take pain medicines exactly as directed. ¨ If the doctor gave you a prescription medicine for pain, take it as prescribed. ¨ If you are not taking a prescription pain medicine, ask your doctor if you can take an over-the-counter medicine. · Leave the splint on until your follow-up appointment. Do not put any weight on the injured foot. If you were given crutches, use them as directed. · Put ice or a cold pack on your foot for 10 to 20 minutes at a time. Try to do this every 1 to 2 hours for the next 3 days (when you are awake) or until the swelling goes down. Put a thin cloth between the ice and your skin. · Prop up the sore foot on a pillow anytime you sit or lie down during the next 3 days.  Try to keep it above the level of your heart. This will help reduce swelling. · Follow the cast care instructions your doctor gives you. If you have a splint, do not take it off unless your doctor tells you to. Cast and splint care  · If you have a removable splint, ask your doctor if it is okay to remove it to bathe. Your doctor may want you to keep it on as much as possible. · Keep your plaster splint covered by taping a sheet of plastic around it when you bathe. Water under the plaster can cause your skin to itch and hurt. · Never cut your splint. When should you call for help? Call 911 anytime you think you may need emergency care. For example, call if:    · You have chest pain, are short of breath, or you cough up blood.    Call your doctor now or seek immediate medical care if:    · You have new or worse pain.     · Your foot is cool or pale or changes color.     · You have tingling, weakness, or numbness in your toes.     · Your cast or splint feels too tight.     · You have signs of a blood clot in your leg (called a deep vein thrombosis), such as:  ¨ Pain in your calf, back of the knee, thigh, or groin. ¨ Redness or swelling in your leg.    Watch closely for changes in your health, and be sure to contact your doctor if:    · You have a problem with your splint or cast.     · You do not get better as expected. Where can you learn more? Go to http://nilo-cindy.info/. Enter A017 in the search box to learn more about \"Broken Foot: Care Instructions. \"  Current as of: November 29, 2017  Content Version: 11.7  © 3780-4138 [employer]. Care instructions adapted under license by [employer] (which disclaims liability or warranty for this information). If you have questions about a medical condition or this instruction, always ask your healthcare professional. [address] any warranty or liability for your use of this information.

## 2018-10-16 NOTE — PROCEDURES
X-rays of the right foot          Bones: healed right 2nd metatarsal Fx // NO dislocations. No focal osteolytic or osteoblastic process                          Bone Spurs: Bone spur noted to calcaneus (inferior and posterior aspects)  Foot Alignment: WNL  Joint Condition: Great toe OA noted  Soft Tissues: Normal, No radiopaque foreign body and No abnormal calcific densities to soft tissues                        No ankle joint effusion in lateral projection.   Mineralization: Suggests  no Osteopenia

## 2018-10-16 NOTE — PROGRESS NOTES
Patient: Donnell Frazier                     MRN: [id number]       SSN: [id number]  YOB: 1955                                      AGE: 58 y.o. SEX: female    Brooklynn Tesfaye MD      Chief Complaint:   Chief Complaint   Patient presents with    Foot Pain     right foot pain           HPI:     Donnell Frazier is a 58 y.o. female who presents today for evaluation of right second metatarsal fracture. Patient was last seen in the office on 9/11/2018 by Dr. Maximiliano Wilkinson. The patient's prior history is detailed in the previous encounter. At the last visit, the patient's plan was as follows:    Plan:    1) Continue using the extended SMO brace. 2) Continue using the bone stimulator as directed. 3) RTO - 5 weeks     Since LOV, patient has continued to use the SMO brace and has less pain. She reports some pain across the dorsolateral aspect of her right foot. She has continue to use the bone stimulator. PHYSICAL EXAM:     Visit Vitals    /60    Pulse 88    Temp 97.9 °F (36.6 °C) (Oral)    Resp 16    Ht 5' 3\" (1.6 m)    Wt 218 lb 9.6 oz (99.2 kg)    SpO2 97%    BMI 38.72 kg/m2     Pain Scale: /10      GEN:  Alert, well developed, well nourished, well appearing 58 y.o. female in no acute distress. PSYCH:  Normal affect, mood, and conduct. alert, oriented x 3 alert, oriented x 3, no dementia  M/S EXAMINATION OF: Right ANKLE and FOOT         Gait: slow  Tenderness: no tenderness to second metatarsal.    Mild tenderness on midfoot stress. Cutaneous: No rashes, skin patches, wounds, or abrasions to the lower legs                                Warm and Normal color. No regions of expressible drainage.                                Medial Border of [place] Region: absent                                Skin color, texture, turgor normal. Normal.                                Mild swelling from forefoot to midfoot.   Joint Motion: ROM Ankle:Normal , Hindfoot: (ST,TN,CC Normal}, Forefoot toes:Decreased  Neurologic Exam: Neuro: Motor: normal 5/5       IMPRESSION:     1. Right foot pain    2. Closed nondisplaced fracture of second metatarsal bone of right foot with routine healing, subsequent encounter          PLAN:         Orders Placed This Encounter    [96690] Foot Min 3V               Continue wearing custom SMO brace, right  Temporary Handicap placard application provided  Please follow up in 6 weeks              Patient has been discussed with Dr. Natalya Chavez during this visit and he agrees with the assessment and plan. Patient understands treatment plan and has been provided with patient education. PAST MEDICAL HISTORY:     Past Medical History:   Diagnosis Date    Asthma     Dr. Asad [facility])     Lung Cancer s/p right lobectomy Dr Negrita Leiva 10/06    Chronic obstructive pulmonary disease ([address]     and right lung nodules, stable on serial CT last 6/14    Colon adenoma     2009 Dr. Bandar Augustin; adenoma and diverticulosis 9/12 Dr. Araceli Cramer; 12/26/17    FHx: breast cancer     Glaucoma     Dr Araceli Urena Hyperlipidemia 5/14/2018    Hypovitaminosis D 2011    IFG (impaired fasting glucose)     Morbid obesity (HCC)     peak weight 209 lbs, bmi 37 from 2/14    PUD (peptic ulcer disease)     h pylori tx'ed    Sleep apnea     on CPAP, Dr. Stephanie Arenas 2010    Venous insufficiency        MEDICATIONS:     Current Outpatient Prescriptions   Medication Sig    ergocalciferol (VITAMIN D2) 50,000 unit capsule Take 1 Cap by mouth every seven (7) days.  potassium chloride (K-DUR, KLOR-CON) 10 mEq tablet Take 1 Tab by mouth daily.  magnesium oxide (MAG-OX) 400 mg tablet Take 400 mg by mouth daily.  ascorbic acid (VITAMIN C) 250 mg tablet Take  by mouth.  beclomethasone (QVAR) 80 mcg/actuation inhaler Take 1 Puff by inhalation two (2) times a day.     albuterol (PROVENTIL HFA, VENTOLIN HFA) 90 mcg/actuation inhaler Take 2 Puffs by inhalation every four (4) hours as needed.  multivitamin (ONE A DAY) tablet Take 1 Tab by mouth daily.  diclofenac (VOLTAREN) 1 % gel Apply  to affected area four (4) times daily. USE AS DIRECTED    lidocaine-EPINEPHrine (XYLOCAINE) 1 %-1:100,000 injection 10 mL by IntraDERMal route once for 1 dose. Indications: ADMINISTRATION OF LOCAL ANESTHESIA     No current facility-administered medications for this visit. ALLERGIES:     Allergies   Allergen Reactions    Advair Diskus [Fluticasone-Salmeterol] Other (comments)     Hoarseness    Naproxen Unable to Obtain    Pcn [Penicillins] Rash         PAST SURGICAL HISTORY:     Past Surgical History:   Procedure Laterality Date    CARDIAC SURG PROCEDURE UNLIST  11/07    thallium negative    CARDIAC SURG PROCEDURE UNLIST  12/2017    echo showed nl lv, ef 65%, gr 1 dd; Dr York [place]  09/2016    ex stress echo without clear wma Dr Jewel Acevedo  11/07    negative    EGD      PUD on EGD, H pylori +txed 2009.  Gillian Greco 2009 adenoma; Dr Jair Leonard 9/12  adenoma and tics; 12/26/17 sessile serrated and tubular adenomas and tics    HX HEENT      thyroid US negative 6/14    HX HYSTERECTOMY  1993    for fibroids    HX LOBECTOMY      for cancer, Dr. Gretel Sanchez 10/06. No adjuvant tx. She has another spot on the same side that is being followed every three months.  HX ORTHOPAEDIC      Left foot ([place]) and knee surgery x2 Dr. Eun Snider.   Right foot fx 4/18 Dr Sera Sanders [address]T [address]  6/11    negative       SOCIAL HISTORY:     Social History     Social History    Marital status: SINGLE     Spouse name: N/A    Number of children: 0    Years of education: N/A     Occupational History    [place]       Social History Main Topics    Smoking status: Former Smoker     Packs/day: 0.50     Years: 20.00     Types: Cigarettes     Quit date: 1/1/1990    Smokeless tobacco: Never Used    Alcohol use No    Drug use: No    Sexual activity: Not Currently     Other Topics Concern    Not on file     Social History Narrative       FAMILY HISTORY:     Family History   Problem Relation Age of Onset    Breast Cancer Mother     Other Brother      SLE    Other Brother      SLE    Other Brother      SLE    Hypertension Father          REVIEW OF SYSTEMS:     Otherwise as noted in HPI      RADIOGRAPHS & DIAGNOSTIC STUDIES     Results for orders placed or performed in visit on 10/16/18   AMB POC XRAY, FOOT; COMPLETE, 3+ VIEW    Narrative    Simona Sherwood PA-C     10/16/2018  4:24 PM  X-rays of the right foot          Bones: healed right 2nd metatarsal Fx // NO dislocations. No   focal osteolytic or osteoblastic process                          Bone Spurs: Bone spur noted to calcaneus   (inferior and posterior aspects)  Foot Alignment: WNL  Joint Condition: Great toe OA noted  Soft Tissues: Normal, No radiopaque foreign body and No abnormal   calcific densities to soft tissues                        No ankle joint effusion in lateral   projection.   Mineralization: Suggests  no Osteopenia       Simona Sherwood PA-C  10/16/2018

## 2018-10-16 NOTE — MR AVS SNAPSHOT
[address], [address] [facility] 
[phone] Patient: Colton Parent MRN: [id number] Visit Information Date & Time Provider Department Dept. Phone Encounter #  
 10/16/2018  3:40 PM Sheridan Woody, [address] [facility] [phone] Follow-up Instructions Return in about 6 weeks (around 11/27/2018) for follow up evaluation. Your Appointments 11/20/2018  2:00 PM  
PHYSICAL with Brayden Velazquez MD  
[facility] [address] [place] [address]) Appt Note: pe rd labs at lab [address], [address] [id number] [address] [facility]  
  
   
 [address] [facility], [address] Upcoming Health Maintenance Date Due Hepatitis C Screening 1955 Shingrix Vaccine Age 50> (1 of 2) 5/4/2004 Influenza Age 5 to Adult 8/1/2018 BREAST CANCER SCRN MAMMOGRAM 11/13/2019 COLONOSCOPY 12/26/2022 DTaP/Tdap/Td series (2 - Td) 8/31/2026 Allergies as of 10/16/2018  Review Complete On: 10/16/2018 By: Yojana Mccain PA-C Severity Noted Reaction Type Reactions Advair Diskus [Fluticasone-salmeterol]   Intolerance Other (comments) Hoarseness Naproxen    Unable to Obtain Pcn [Penicillins]    Rash Current Immunizations  Reviewed on 8/28/2012 Name Date  
 TD Vaccine 1/1/1990 Not reviewed this visit You Were Diagnosed With   
  
 Codes Comments Right foot pain    -  Primary ICD-10-CM: O00.244 ICD-9-CM: 729.5 Closed nondisplaced fracture of second metatarsal bone of right foot with routine healing, subsequent encounter     ICD-10-CM: B74.139V ICD-9-CM: V54.19 Vitals BP Pulse Temp Resp Height(growth percentile) Weight(growth percentile) 131/60 88 97.9 °F (36.6 °C) (Oral) 16 5' 3\" (1.6 m) 218 lb 9.6 oz (99.2 kg) SpO2 BMI OB Status Smoking Status 97% 38.72 kg/m2 Hysterectomy Former Smoker BMI and BSA Data Body Mass Index Body Surface Area 38.72 kg/m 2 2.1 m 2 Preferred Pharmacy Pharmacy Name Phone [facility], [address] [facility] [phone] Your Updated Medication List  
  
   
This list is accurate as of 10/16/18  4:16 PM.  Always use your most recent med list.  
  
  
  
  
 albuterol 90 mcg/actuation inhaler Commonly known as:  PROVENTIL HFA, VENTOLIN HFA, PROAIR HFA Take 2 Puffs by inhalation every four (4) hours as needed. beclomethasone 80 mcg/actuation [employer] Commonly known as:  QVAR Take 1 Puff by inhalation two (2) times a day. diclofenac 1 % Gel Commonly known as:  VOLTAREN Apply  to affected area four (4) times daily. USE AS DIRECTED  
  
 ergocalciferol 50,000 unit capsule Commonly known as:  VITAMIN D2 Take 1 Cap by mouth every seven (7) days. lidocaine-EPINEPHrine 1 %-1:100,000 injection Commonly known as:  XYLOCAINE 10 mL by IntraDERMal route once for 1 dose. Indications: ADMINISTRATION OF LOCAL ANESTHESIA  
  
 magnesium oxide 400 mg tablet Commonly known as:  MAG-OX Take 400 mg by mouth daily. multivitamin tablet Commonly known as:  ONE A DAY Take 1 Tab by mouth daily. potassium chloride 10 mEq tablet Commonly known as:  KLOR-CON Take 1 Tab by mouth daily. VITAMIN C 250 mg tablet Generic drug:  ascorbic acid (vitamin C) Take  by mouth. We Performed the Following AMB POC XRAY, FOOT; COMPLETE, 3+ VIEW [98214 CPT(R)] Follow-up Instructions Return in about 6 weeks (around 11/27/2018) for follow up evaluation. Patient Instructions Continue wearing custom SMO brace, right Temporary Handicap placard application provided Please follow up in 6 weeks Broken Foot: Care Instructions Your Care Instructions A broken foot, or foot fracture, is a break in one or more of the bones in your foot. It may happen because of a sports injury, a fall, or other accident. A compound, or open, fracture occurs when a bone breaks through the skin. A break that does not poke through the skin is a closed fracture. Your treatment depends on the location and type of break in your foot. You may need a splint, a cast, or an orthopedic shoe. Certain kinds of injuries may need surgery at some time. Whatever your treatment, you can ease symptoms and help your foot heal with care at home. You may need 6 to 8 weeks or more to fully heal. 
You heal best when you take good care of yourself. Eat a variety of healthy foods, and don't smoke. Follow-up care is a key part of your treatment and safety. Be sure to make and go to all appointments, and call your doctor if you are having problems. It's also a good idea to know your test results and keep a list of the medicines you take. How can you care for yourself at home? · Be safe with medicines. Take pain medicines exactly as directed. ¨ If the doctor gave you a prescription medicine for pain, take it as prescribed. ¨ If you are not taking a prescription pain medicine, ask your doctor if you can take an over-the-counter medicine. · Leave the splint on until your follow-up appointment. Do not put any weight on the injured foot. If you were given crutches, use them as directed. · Put ice or a cold pack on your foot for 10 to 20 minutes at a time. Try to do this every 1 to 2 hours for the next 3 days (when you are awake) or until the swelling goes down. Put a thin cloth between the ice and your skin. · Prop up the sore foot on a pillow anytime you sit or lie down during the next 3 days. Try to keep it above the level of your heart. This will help reduce swelling. · Follow the cast care instructions your doctor gives you. If you have a splint, do not take it off unless your doctor tells you to. Cast and splint care · If you have a removable splint, ask your doctor if it is okay to remove it to bathe. Your doctor may want you to keep it on as much as possible. · Keep your plaster splint covered by taping a sheet of plastic around it when you bathe. Water under the plaster can cause your skin to itch and hurt. · Never cut your splint. When should you call for help? Call 911 anytime you think you may need emergency care. For example, call if: 
  · You have chest pain, are short of breath, or you cough up blood.  
 Call your doctor now or seek immediate medical care if: 
  · You have new or worse pain.  
  · Your foot is cool or pale or changes color.  
  · You have tingling, weakness, or numbness in your toes.  
  · Your cast or splint feels too tight.  
  · You have signs of a blood clot in your leg (called a deep vein thrombosis), such as: 
¨ Pain in your calf, back of the knee, thigh, or groin. ¨ Redness or swelling in your leg.  
 Watch closely for changes in your health, and be sure to contact your doctor if: 
  · You have a problem with your splint or cast.  
  · You do not get better as expected. Where can you learn more? Go to http://nilo-cindy.info/. Enter T032 in the search box to learn more about \"Broken Foot: Care Instructions. \" Current as of: November 29, 2017 Content Version: 11.7 © 7437-8812 [employer]. Care instructions adapted under license by [employer] (which disclaims liability or warranty for this information). If you have questions about a medical condition or this instruction, always ask your healthcare professional. [place] [address] any warranty or liability for your use of this information. Introducing [facility] & HEALTH SERVICES! Natasha Tierney introduces [employer] patient portal. Now you can access parts of your medical record, email your doctor's office, and request medication refills online. 1. In your internet browser, go to [email or web address] 2. Click on the First Time User? Click Here link in the Sign In box. You will see the New Member Sign Up page. 3. Enter your [employer] Access Code exactly as it appears below. You will not need to use this code after youve completed the sign-up process. If you do not sign up before the expiration date, you must request a new code. · [employer] Access Code: [email or web address] Expires: 10/16/2018  4:29 PM 
 
4. Enter the last four digits of your Social Security Number (xxxx) and Date of Birth (mm/dd/yyyy) as indicated and click Submit. You will be taken to the next sign-up page. 5. Create a [employer] ID. This will be your [employer] login ID and cannot be changed, so think of one that is secure and easy to remember. 6. Create a [employer] password. You can change your password at any time. 7. Enter your Password Reset Question and Answer. This can be used at a later time if you forget your password. 8. Enter your e-mail address. You will receive e-mail notification when new information is available in [address] 9. Click Sign Up. You can now view and download portions of your medical record. 10. Click the Download Summary menu link to download a portable copy of your medical information. If you have questions, please visit the Frequently Asked Questions section of the [employer] website. Remember, [employer] is NOT to be used for urgent needs. For medical emergencies, dial 911. Now available from your iPhone and Android! Please provide this summary of care documentation to your next provider. Your primary care clinician is listed as Judy Edouard. If you have any questions after today's visit, please call [phone].

## 2018-11-09 ENCOUNTER — TELEPHONE (OUTPATIENT)
Dept: ORTHOPEDIC SURGERY | Age: 63
End: 2018-11-09

## 2018-11-09 NOTE — LETTER
NOTIFICATION RETURN TO WORK  
 
11/13/2018 3:24 PM 
 
Ms. Ne [address] 
[address] To Whom It May Concern: 
 
Ne Myers [facility] the [facility] She will return to work at fully duty starting Tuesday, November 13. If there are questions or concerns please have the patient contact our office.  
 
 
 
Sincerely, 
 
 
Rhys Garrett MD

## 2018-11-09 NOTE — TELEPHONE ENCOUNTER
Patient called because she needs a note taking her off of light duty at work. The last work note states she will only be on light duty for 4 weeks (8/1/18) but she states her job needs clarification. Patient wants to  a copy of work letter ASAP.

## 2018-11-09 NOTE — TELEPHONE ENCOUNTER
Please provide patient with updated note to return to full duty.  She must wear the [address] brace and supportive shoes    Ame Riley PA-C  11/9/2018   12:07 PM

## 2018-11-14 ENCOUNTER — OFFICE VISIT (OUTPATIENT)
Dept: ORTHOPEDIC SURGERY | Age: 63
End: 2018-11-14

## 2018-11-14 VITALS
OXYGEN SATURATION: 94 % | RESPIRATION RATE: 16 BRPM | HEART RATE: 92 BPM | DIASTOLIC BLOOD PRESSURE: 64 MMHG | HEIGHT: 63 IN | WEIGHT: 218.8 LBS | SYSTOLIC BLOOD PRESSURE: 139 MMHG | BODY MASS INDEX: 38.77 KG/M2 | TEMPERATURE: 95.9 F

## 2018-11-14 DIAGNOSIS — S92.324D CLOSED NONDISPLACED FRACTURE OF SECOND METATARSAL BONE OF RIGHT FOOT WITH ROUTINE HEALING, SUBSEQUENT ENCOUNTER: ICD-10-CM

## 2018-11-14 DIAGNOSIS — S92.301G DELAYED UNION OF FRACTURE OF METATARSAL BONE OF RIGHT FOOT: Primary | ICD-10-CM

## 2018-11-14 NOTE — PATIENT INSTRUCTIONS
Please follow up in 3 months. You are advised to contact us if your condition worsens. Fifth Metatarsal Fracture: Rehab Exercises Your Care Instructions Here are some examples of typical rehabilitation exercises for your condition. Start each exercise slowly. Ease off the exercise if you start to have pain. Your doctor or physical therapist will tell you when you can start these exercises and which ones will work best for you. How to do the exercises Calf wall stretch (back knee straight) 1. Stand facing a wall with your hands on the wall at about eye level. Put your affected foot about a step behind your other foot. 2. Keeping your back leg straight and your back heel on the floor, bend your front knee and gently bring your hip and chest toward the wall until you feel a stretch in the calf of your back leg. 3. Hold the stretch for at least 15 to 30 seconds. 4. Repeat 2 to 4 times. Calf wall stretch (knees bent) 1. Stand facing a wall with your hands on the wall at about eye level. Put your affected foot about a step behind your other foot. 2. Keeping both heels on the floor, bend both knees. Then gently bring your hip and chest toward the wall until you feel a stretch in the calf of your back leg. 3. Hold the stretch for at least 15 to 30 seconds. 4. Repeat 2 to 4 times. Towel scrunches 1. Sit in a chair, and place both feet on a towel on the floor. 2. Scrunch the towel toward you with your toes. Then use your toes to push the towel back into place. 3. Repeat 8 to 12 times. Towel inversion and eversion 1. Sit in a chair, and place both feet on a towel on the floor. 2. Swivel your feet from side to side to slide the towel. First slide your toes, then your heels, as you move the towel with your feet. Then change directions and swivel your feet from side to side to slide the towel back to the starting position. 3. Repeat 8 to 12 times. Resisted ankle inversion 1. Sit on the floor with your good foot crossed over your affected foot. 2. Hold both ends of an exercise band, and loop the band around the inside of your affected foot. Then press your other foot against the band. 3. Keeping your feet crossed, slowly push your affected foot against the band so that foot moves away from your other foot. Then slowly relax. 4. Repeat 8 to 12 times. Resisted ankle eversion 1. Sit on the floor with your legs straight. 2. Hold both ends of an exercise band, and loop the band around the outside of your affected foot. Then press your other foot against the band. 3. Keeping your leg straight, slowly push your affected foot outward against the band and away from your other foot without letting your leg rotate. Then slowly relax. 4. Repeat 8 to 12 times. Follow-up care is a key part of your treatment and safety. Be sure to make and go to all appointments, and call your doctor if you are having problems. It's also a good idea to know your test results and keep a list of the medicines you take. Where can you learn more? Go to http://nilo-cindy.info/. Enter 181 [id number] in the search box to learn more about \"Fifth Metatarsal Fracture: Rehab Exercises. \" Current as of: November 29, 2017 Content Version: 11.8 © 9210-4446 [employer]. Care instructions adapted under license by [employer] (which disclaims liability or warranty for this information). If you have questions about a medical condition or this instruction, always ask your healthcare professional. [place] [address] any warranty or liability for your use of this information.

## 2018-11-14 NOTE — PROGRESS NOTES
AMBULATORY PROGRESS NOTE Patient: Terri Serrano             MRN: [id number]     SSN: [id number] Body mass index is 38.76 kg/m². YOB: 1955     AGE: 58 y.o. EX: female PCP: Martine Romero MD 
 
 IMPRESSION/DIAGNOSIS AND TREATMENT PLAN  
 
DIAGNOSES 1. Delayed union of fracture of metatarsal bone of right foot 2. Closed nondisplaced fracture of second metatarsal bone of right foot with routine healing, subsequent encounter No orders of the defined types were placed in this encounter. Terri Serrano understands her diagnoses and the proposed plan. Plan: 
 
1) Continue using the SMO brace and bone stimulator as directed. 2) Continue gentle ROM exercises. 3) Work Note: Please allow Ms. Harper Snellen to use her discretion to take 20 minute breaks as needed, as she is still healing from her metatarsal fracture. 4) Continue supplementing with calcium and vitamin D. 5) Anticipate CT scan if condition does not improve. RTO - 3 months // PLEASE OBTAIN X-RAYS OF: right foot 3 VIEWS 
 HPI AND EXAMINATION Terri Serrano IS A 58 y.o. female who presents to my outpatient office for follow up of a closed nondisplaced fracture of the second metatarsal of the right foot. At the last visit, Courtney Schafer PA-C, instructed the patient to continue activity modification as directed and to continue wearing the custom [facility] brace. Since we saw her last, Ms. Harper Snellen states that she is doing better, though she is still experiencing some pain and soreness. She states that she has been using the bone stimulator for a month and wearing her [facility] brace as directed. She notes that the [facility] brace has been helping with her pain. She reports that she has returned to work and inquires about whether she should be on light duty. The patient works at [employer]. Visit Vitals /64 Pulse 92 Temp 95.9 °F (35.5 °C) (Oral) Resp 16 Ht 5' 3\" (1.6 m) Wt 218 lb 12.8 oz (99.2 kg) SpO2 94% BMI 38.76 kg/m² Right ANKLE and FOOT    
   
Gait: slow Tenderness: mild tenderness to second metatarsal. 
                        Mild tenderness to the midfoot. Cutaneous: No rashes, skin patches, wounds, or abrasions to the lower legs                               Warm and Normal color. No regions of expressible drainage. 
                              Medial Border of Tibia Region: absent 
                              Skin color, texture, turgor normal. Normal. 
                              Minimal swelling over the midfoot. Joint Motion: ROM Ankle:Normal , Hindfoot: (ST,TN,CC Normal}, Forefoot toes:Decreased Neurologic Exam: Neuro: Motor: normal 5/5 strength in all tested muscle groups and Sensory : no sensory deficits noted. Contractures: Gastrocnemius or Achilles Contractures absent Joint / Tendon Stability: No Ankle or Subtalar instability or joint laxity.                                            No peroneal sublux ability or dislocation Alignment: Slight Pes Planus Vascular: Normal Pulses/ NL Capillary refill, No evidence of DVT seen on physical exam. 
                      No calf swelling, no tenderness to calf muscles. Lymphatic:  No Evidence of Lymphedema. CHART REVIEW Past Medical History:  
Diagnosis Date  Asthma Dr. Joe Odom  Cancer ([facility] [address] Lung Cancer s/p right lobectomy Dr Cem Ragsdale 10/06  Chronic obstructive pulmonary disease ([facility] [address]   
 and right lung nodules, stable on serial CT last 6/14  Colon adenoma 2009 Dr. Divya Gonzalez; adenoma and diverticulosis 9/12 Dr. Balwinder Segundo; 12/26/17  FHx: breast cancer  Glaucoma Dr Leighton Monreal  Hyperlipidemia 5/14/2018  Hypovitaminosis D 2011  IFG (impaired fasting glucose)  Morbid obesity ([facility] [address] peak weight 209 lbs, bmi 37 from 2/14  PUD (peptic ulcer disease)   
 h pylori tx'ed  Sleep apnea   
 on CPAP, Dr. Oziel Maddox 2010  Venous insufficiency Current Outpatient Medications Medication Sig  
 diclofenac (VOLTAREN) 1 % gel Apply  to affected area four (4) times daily. USE AS DIRECTED  ergocalciferol (VITAMIN D2) 50,000 unit capsule Take 1 Cap by mouth every seven (7) days.  potassium chloride (K-DUR, KLOR-CON) 10 mEq tablet Take 1 Tab by mouth daily.  magnesium oxide (MAG-OX) 400 mg tablet Take 400 mg by mouth daily.  ascorbic acid (VITAMIN C) 250 mg tablet Take  by mouth.  beclomethasone (QVAR) 80 mcg/actuation inhaler Take 1 Puff by inhalation two (2) times a day.  albuterol (PROVENTIL HFA, VENTOLIN HFA) 90 mcg/actuation inhaler Take 2 Puffs by inhalation every four (4) hours as needed.  multivitamin (ONE A DAY) tablet Take 1 Tab by mouth daily.  lidocaine-EPINEPHrine (XYLOCAINE) 1 %-1:100,000 injection 10 mL by IntraDERMal route once for 1 dose. Indications: ADMINISTRATION OF LOCAL ANESTHESIA No current facility-administered medications for this visit. Allergies Allergen Reactions  Advair Diskus [Fluticasone-Salmeterol] Other (comments) Hoarseness  Naproxen Unable to Obtain  Pcn [Penicillins] Rash Past Surgical History:  
Procedure Laterality Date  CARDIAC SURG PROCEDURE UNLIST  11/07  
 thallium negative  CARDIAC SURG PROCEDURE UNLIST  12/2017  
 echo showed nl lv, ef 65%, gr 1 dd; Dr Lisa Barker  CARDIAC SURG PROCEDURE UNLIST  09/2016  
 ex stress echo without clear wma Dr Mayda Shannon  CT HEAD W CONT  11/07  
 negative  EGD    
 PUD on EGD, H pylori +txed 2009.  HX APPENDECTOMY  1980s  HX CATARACT REMOVAL Dr Travis Pena  HX COLONOSCOPY Dr. Royann Bumpers 2009 adenoma; Dr Toro Query 9/12  adenoma and tics; 12/26/17 sessile serrated and tubular adenomas and tics  HX HEENT    
 thyroid US negative 6/14 [address] [facility]  
 for fibroids  HX LOBECTOMY for cancer, Dr. Tyson Wiggins 10/06. No adjuvant tx.  She has another spot on the same side that is being followed every three months.  HX ORTHOPAEDIC Left foot ([place]) and knee surgery x2 Dr. Jony Oviedo. Right foot fx  Dr Christina Avitia [address] [facility]    
 negative Social History Occupational History  Occupation: [employer] Tobacco Use  Smoking status: Former Smoker Packs/day: 0.50 Years: 20.00 Pack years: 10.00 Types: Cigarettes Last attempt to quit: 1990 Years since quittin.8  Smokeless tobacco: Never Used Substance and Sexual Activity  Alcohol use: No  
 Drug use: No  
 Sexual activity: Not Currently Family History Problem Relation Age of Onset  Breast Cancer Mother  Other Brother SLE  
 Other Brother SLE  
 Other Brother SLE  Hypertension Father REVIEW OF SYSTEMS : 2018  ALL BELOW ARE Negative except : SEE HPI Constitutional: Negative for fever, chills and weight loss. Neg Weight Loss Cardiovascular: Negative for chest pain, claudication and leg swelling. SOB, RAHMAN Gastrointestinal/Urological: Negative for  pain, N/V/D/C, Blood in stool or urine,dysuria                         Hematuria, Incontinence, pelvic pain Musculoskeletal: see HPI. Neurological: Negative for dizziness and weakness, headaches,Visual Changes             Confusion,  Or Seizures, Psychiatric/Behavioral: Negative for depression, memory loss and substance abuse. Extremities:  Negative for hair changes, rash or skin lesion changes. Hematologic: Negative for Bleeding problems, bruising, pallor or swollen lymph nodes. Peripheral Vascular: No calf pain, vascular vein tenderness to calf pain No calf throbbing, posterior knee throbbing pain DIAGNOSTIC IMAGING No notes on file Please see above section of this report. I have personally reviewed the results of the above study.  The interpretation of this study is my professional opinion. Written by Debbie Al, as dictated by Dr. Nas Ledezma. I, Dr. Nas Ledezma, confirm that all documentation is accurate.

## 2018-11-14 NOTE — LETTER
NOTIFICATION RETURN TO WORK / SCHOOL 
 
11/14/2018 10:03 AM 
 
Ms. Carlo [address] 
[address] [place] [address] To Whom It May Concern: 
 
Carlo Pruitt is currently under the care of Ashish Gonzalez. Please allow Ms. Manuel Gordon to use her discretion to take 20 minute breaks as needed, as she is still healing from her metatarsal fracture. If there are questions or concerns please have the patient contact our office.  
 
 
 
Sincerely, 
 
 
Tanner Guillen MD

## 2018-11-17 NOTE — PROGRESS NOTES
58 y.o. BLACK OR  female who presents for rpe She is recovering from the foot issues under the care of Dr Maximiliano Wilkinson and she is back to work Denied any cardiovascular complaints and she has not seen Dr Trenton Hays in almost a year No gi or gu complaints Remains on inhalers as below. F/U CT thorax neg outside of stable nodules. She has been having costochondritis in the right lower anterior chest wall region. Seems to recall she pulled that area weeks ago Denies polyuria, polydipsia, nocturia, vision change. Not checking sugars at this time. She has not been able to lose weight Vitals 11/20/2018 11/14/2018 10/16/2018 9/11/2018 Weight 219 lb 3.2 oz 218 lb 12.8 oz 218 lb 9.6 oz 216 lb 6.4 oz Past Medical History:  
Diagnosis Date  Asthma Dr. Montaño Shoulder  Cancer ([facility] [address] Lung Cancer s/p right lobectomy Dr Breana Cristobal 10/06  Chronic obstructive pulmonary disease ([facility] [address]   
 and right lung nodules, stable on serial CT last 6/14  Colon adenoma 2009 Dr. Shona Linton; adenoma and diverticulosis 9/12 Dr. Satnam Powell; 12/26/17  FHx: breast cancer  Glaucoma Dr Germaine Henry  Hyperlipidemia 5/14/2018  Hypovitaminosis D 2011  IFG (impaired fasting glucose)  Morbid obesity ([facility] [address] peak weight 209 lbs, bmi 37 from 2/14  PUD (peptic ulcer disease)   
 h pylori tx'ed  Pulmonary nodule 06/2014  
 stable 5/18  Sleep apnea   
 on CPAP, Dr. Regina Garcia 2010  Venous insufficiency Past Surgical History:  
Procedure Laterality Date  CARDIAC SURG PROCEDURE UNLIST  11/07  
 thallium negative  CARDIAC SURG PROCEDURE UNLIST  12/2017  
 echo showed nl lv, ef 65%, gr 1 dd; Dr Trenton Hays  CARDIAC SURG PROCEDURE UNLIST  09/2016  
 ex stress echo without clear wma Dr eJnnifer Gallego  CT HEAD W CONT  11/07  
 negative  EGD    
 PUD on EGD, H pylori +txed 2009.  HX APPENDECTOMY  1980s  HX CATARACT REMOVAL Dr Germaine Henry  HX COLONOSCOPY Dr. Marcel Hermosillo 2009 adenoma; Dr Jaz Posadas   adenoma and tics; 17 sessile serrated and tubular adenomas and tics  HX HEENT    
 thyroid US negative  [address] [facility]  
 for fibroids  HX LOBECTOMY for cancer, Dr. Jeanine Wilkerson 10/06. No adjuvant tx. She has another spot on the same side that is being followed every three months.  HX ORTHOPAEDIC Left foot ([place]) and knee surgery x2 Dr. Jim Olszewski. Right foot fx  Dr CHOPRA [address] [facility] [place]    
 negative Social History Socioeconomic History  Marital status: SINGLE Spouse name: Not on file  Number of children: 0  
 Years of education: Not on file  Highest education level: Not on file Social Needs  Financial resource strain: Not on file  Food insecurity - worry: Not on file  Food insecurity - inability: Not on file  Transportation needs - medical: Not on file  Transportation needs - non-medical: Not on file Occupational History  Occupation: [employer] Tobacco Use  Smoking status: Former Smoker Packs/day: 0.50 Years: 20.00 Pack years: 10.00 Types: Cigarettes Last attempt to quit: 1990 Years since quittin.9  Smokeless tobacco: Never Used Substance and Sexual Activity  Alcohol use: No  
 Drug use: No  
 Sexual activity: Not Currently Other Topics Concern  Not on file Social History Narrative  Not on file Family History Problem Relation Age of Onset  Breast Cancer Mother  Other Brother SLE  
 Other Brother SLE  
 Other Brother SLE  Hypertension Father Immunization History Administered Date(s) Administered  TD Vaccine 1990 Current Outpatient Medications Medication Sig  ergocalciferol (VITAMIN D2) 50,000 unit capsule Take 1 Cap by mouth every seven (7) days.   
 potassium chloride (K-DUR, KLOR-CON) 10 mEq tablet Take 1 Tab by mouth daily.  
 magnesium oxide (MAG-OX) 400 mg tablet Take 400 mg by mouth daily.  ascorbic acid (VITAMIN C) 250 mg tablet Take  by mouth.  beclomethasone (QVAR) 80 mcg/actuation inhaler Take 1 Puff by inhalation two (2) times a day.  albuterol (PROVENTIL HFA, VENTOLIN HFA) 90 mcg/actuation inhaler Take 2 Puffs by inhalation every four (4) hours as needed.  multivitamin (ONE A DAY) tablet Take 1 Tab by mouth daily.  diclofenac (VOLTAREN) 1 % gel Apply  to affected area four (4) times daily. USE AS DIRECTED  lidocaine-EPINEPHrine (XYLOCAINE) 1 %-1:100,000 injection 10 mL by IntraDERMal route once for 1 dose. Indications: ADMINISTRATION OF LOCAL ANESTHESIA No current facility-administered medications for this visit. Allergies Allergen Reactions  Advair Diskus [Fluticasone-Salmeterol] Other (comments) Hoarseness  Naproxen Unable to Obtain  Pcn [Penicillins] Rash REVIEW OF SYSTEMS: mammo 11/17, hyst, colo Dr Jair Leonard 9/12 Ophtho  no vision change or eye pain Oral  no mouth pain, tongue or tooth problems Ears  no hearing loss, ear pain, fullness, no swallowing problems Cardiac  no CP, PND, orthopnea, palpitations or syncope Chest  no breast masses Resp  no wheezing, chronic coughing, dyspnea GI  no heartburn, nausea, vomiting, change in bowel habits, bleeding, hemorrhoids Urinary  no dysuria, hematuria, flank pain, urgency, frequency Genitals  no genital lesions, discharge, masses, ulceration, warts Ortho  no swelling, dec ROM, myalgias Endo - no polyuria, polydipsia, nocturia, hot flashes Visit Vitals /72 (BP 1 Location: Left arm, BP Patient Position: Sitting) Pulse 77 Temp 99.6 °F (37.6 °C) (Oral) Resp 15 Ht 5' 3\" (1.6 m) Wt 219 lb 3.2 oz (99.4 kg) SpO2 94% BMI 38.83 kg/m² A&O x3 Affect is appropriate. Mood stable No apparent distress Anicteric, no JVD, adenopathy or thyromegaly. No carotid bruits or radiated murmur Chest wall tenderness right lower ribcage region, no bruising Lungs clear to auscultation, no wheezes or rales Heart showed regular rate and rhythm. No murmur, rubs, gallops Abdomen soft nontender, no hepatosplenomegaly or masses. Extremities with trace pedal edema LABS From 8/12 showed   gluc 96,   cr 0.63, gfr 115, alt 10,           chol 197, tg 77, hdl 82, ldl-c 100, wbc 5.4, hb 12.6, plt 242, tsh 0.81, ua neg From 3/14 showed   gluc 105, cr 0.58, gfr 101,     hba1c 5.8 From 6/15 showed   gluc 97,   cr 0.73, gfr>60,  alt<5,  hba1c 5.6, chol 192, tg 56, hdl 73, ldl-c 108, wbc 5.4, hb 12.3, plt 249, tsh 0.96, vit d 12.7, hiv neg From 11/15 showed                            ddimer neg From 5/16 showed   gluc 99,   cr 0.62, gfr 112,          wbc 5.9, hb 12.5, plt 258, tsh 1.04, ua neg,     proBNP 29 From 10/17 showed gluc 100, cr 0.71, gfr 91,   alt 16, hba1c 5.2, chol 212, tg 53, hdl 92, ldl-c 109, wbc 5.4, hb 12.9, plt 253,             vit d 12.8 From 4/18 showed   gluc 100, cr 0.57, gfr 99,          wbc 6.5, hb 11.6, plt 274, uric 6.0, esr 19 From 8/18 showed   gluc 84,   cr 0.50, gfr 103, alt 6,           vit d 19.0 Patient Active Problem List  
Diagnosis Code  Colon adenoma and diverticulosis Dr. Raymundo Bernard 2012 D12.6  Sleep apnea Dr. Matt Jones 2010 G47.30  Venous insufficiency I87.2  Hypovitaminosis D E55.9  
 Hx of cancer of lung 2006 s/p resection Z85.118  
 IFG (impaired fasting glucose) R73.01  
 COPD (chronic obstructive pulmonary disease) (HCC) J44.9  Pulmonary nodules R91.8  Hyperlipidemia E78.5  Severe obesity (BMI 35.0-39. 9) with comorbidity ([address] E66.01 Assessment and plan: 1. Morbid obesity. Previously declined eagle supps, medically supervised wt loss program, bariatrics. Lifestyle and dietary measures, portion control 2. Colon adenoma and diverticulosis. Fiber, colo 2022 3. MIMI on cpap. F/U Dr. Matt Jones 4. Hypovit d. Restart supplementation 5. H/O lung ca and lung nodules. CT 5/19 
6. IFG. Wt loss would be ideal.  Lifestyle and dietary measures as above 7. Right foot fx. Per Dr Angelica Quintero 8. Costochondritis. Avoiding nsaid w prior pud hx 9. Mammo ordered 10. Declined flu shot RTC 5/19 Above conditions discussed at length and patient vocalized understanding. All questions answered to patient satisfaction

## 2018-11-20 ENCOUNTER — OFFICE VISIT (OUTPATIENT)
Dept: INTERNAL MEDICINE CLINIC | Age: 63
End: 2018-11-20

## 2018-11-20 VITALS
DIASTOLIC BLOOD PRESSURE: 72 MMHG | BODY MASS INDEX: 38.84 KG/M2 | OXYGEN SATURATION: 94 % | WEIGHT: 219.2 LBS | TEMPERATURE: 99.6 F | HEIGHT: 63 IN | SYSTOLIC BLOOD PRESSURE: 110 MMHG | HEART RATE: 77 BPM | RESPIRATION RATE: 15 BRPM

## 2018-11-20 DIAGNOSIS — Z00.00 PHYSICAL EXAM: Primary | ICD-10-CM

## 2018-11-20 DIAGNOSIS — E78.5 HYPERLIPIDEMIA, UNSPECIFIED HYPERLIPIDEMIA TYPE: ICD-10-CM

## 2018-11-20 DIAGNOSIS — Z85.118 HX OF CANCER OF LUNG: ICD-10-CM

## 2018-11-20 DIAGNOSIS — E55.9 HYPOVITAMINOSIS D: ICD-10-CM

## 2018-11-20 DIAGNOSIS — D12.6 COLON ADENOMA: ICD-10-CM

## 2018-11-20 DIAGNOSIS — G47.33 OBSTRUCTIVE SLEEP APNEA SYNDROME: ICD-10-CM

## 2018-11-20 DIAGNOSIS — R91.8 PULMONARY NODULES: ICD-10-CM

## 2018-11-20 DIAGNOSIS — J44.9 CHRONIC OBSTRUCTIVE PULMONARY DISEASE, UNSPECIFIED COPD TYPE (HCC): ICD-10-CM

## 2018-11-20 DIAGNOSIS — M94.0 COSTOCHONDRITIS: ICD-10-CM

## 2018-11-20 DIAGNOSIS — Z12.31 SCREENING MAMMOGRAM, ENCOUNTER FOR: ICD-10-CM

## 2018-11-20 DIAGNOSIS — E66.01 SEVERE OBESITY (BMI 35.0-39.9) WITH COMORBIDITY (HCC): ICD-10-CM

## 2018-11-20 DIAGNOSIS — R73.01 IFG (IMPAIRED FASTING GLUCOSE): ICD-10-CM

## 2018-11-20 RX ORDER — ERGOCALCIFEROL 1.25 MG/1
50000 CAPSULE ORAL
Qty: 13 CAP | Refills: 3 | Status: SHIPPED | OUTPATIENT
Start: 2018-11-20 | End: 2019-02-19 | Stop reason: SDUPTHER

## 2018-11-20 NOTE — PROGRESS NOTES
1. Have you been to the ER, urgent care clinic or hospitalized since your last visit? NO.  
 
2. Have you seen or consulted any other health care providers outside of the [facility] since your last visit (Include any pap smears or colon screening)? NO Do you have an Advanced Directive? NO Would you like information on Advanced Directives?  NO

## 2019-01-18 ENCOUNTER — OFFICE VISIT (OUTPATIENT)
Dept: INTERNAL MEDICINE CLINIC | Age: 64
End: 2019-01-18

## 2019-01-18 ENCOUNTER — HOSPITAL ENCOUNTER (OUTPATIENT)
Dept: GENERAL RADIOLOGY | Age: 64
Discharge: HOME | End: 2019-01-18
Payer: COMMERCIAL

## 2019-01-18 VITALS
HEART RATE: 72 BPM | BODY MASS INDEX: 39.51 KG/M2 | TEMPERATURE: 98 F | DIASTOLIC BLOOD PRESSURE: 76 MMHG | RESPIRATION RATE: 14 BRPM | HEIGHT: 63 IN | SYSTOLIC BLOOD PRESSURE: 120 MMHG | WEIGHT: 223 LBS | OXYGEN SATURATION: 97 %

## 2019-01-18 DIAGNOSIS — M79.641 PAIN OF RIGHT HAND: Primary | ICD-10-CM

## 2019-01-18 DIAGNOSIS — M79.641 PAIN OF RIGHT HAND: ICD-10-CM

## 2019-01-18 DIAGNOSIS — M25.562 ACUTE PAIN OF BOTH KNEES: ICD-10-CM

## 2019-01-18 DIAGNOSIS — S80.212A ABRASION, KNEE, LEFT, INITIAL ENCOUNTER: ICD-10-CM

## 2019-01-18 DIAGNOSIS — M25.561 ACUTE PAIN OF BOTH KNEES: ICD-10-CM

## 2019-01-18 PROCEDURE — 73120 X-RAY EXAM OF HAND: CPT

## 2019-01-18 RX ORDER — DICLOFENAC SODIUM 10 MG/G
GEL TOPICAL 4 TIMES DAILY
Qty: 100 G | Refills: 1 | Status: SHIPPED | OUTPATIENT
Start: 2019-01-18 | End: 2020-09-28

## 2019-01-18 RX ORDER — TRAMADOL HYDROCHLORIDE 50 MG/1
50 TABLET ORAL
Qty: 30 TAB | Refills: 0 | Status: SHIPPED | OUTPATIENT
Start: 2019-01-18 | End: 2020-09-28

## 2019-01-18 NOTE — PROGRESS NOTES
1. Have you been to the ER, urgent care clinic or hospitalized since your last visit? NO.  
 
2. Have you seen or consulted any other health care providers outside of the [facility] since your last visit (Include any pap smears or colon screening)? NO Chief Complaint Patient presents with  Fall  
  fell off porch and hurt bilateral knees. generalized body aches and pains. right hand pain. fell on this past tues

## 2019-01-18 NOTE — PROGRESS NOTES
58 y.o. BLACK OR  female who presents for evaluation. She was coming down off her porch about 3 days ago when she somehow took a misstep and fell forward. She thinks she landed on her knees and then took a tumble going from the concrete into the grass. She \"popped up quickly\". Not much pain after the accident, started a few hours later. She has been having aches in her thighs, bilateral knees and specifically the right hand. She just wants to make sure there is nothing broken. She has been icing things down, using Epsom salts. Unable to take nonsteroidals with history of peptic ulcer disease. No focal weakness, numbness, paresthesias. No cardiovascular or GI complaints Past Medical History:  
Diagnosis Date  Asthma Dr. Paula Boyd  Cancer ([facility] [address] Lung Cancer s/p right lobectomy Dr Carmelina Bradley 10/06  Chronic obstructive pulmonary disease ([facility] [address]   
 and right lung nodules, stable on serial CT last 6/14  Colon adenoma 2009 Dr. Duglas Hylton; adenoma and diverticulosis 9/12 Dr. Kelvin Pacheco; 12/26/17  FHx: breast cancer  Glaucoma Dr Judi Austin  Hearing loss  Hyperlipidemia 5/14/2018  Hypovitaminosis D 2011  IFG (impaired fasting glucose)  Morbid obesity ([facility] [address] peak weight 209 lbs, bmi 37 from 2/14  PUD (peptic ulcer disease)   
 h pylori tx'ed  Pulmonary nodule 06/2014  
 4 nodules <4mm; stable 5/18  Sleep apnea   
 on CPAP, Dr. Seth Gamboa 2010  Venous insufficiency Past Surgical History:  
Procedure Laterality Date  CARDIAC SURG PROCEDURE UNLIST  11/07  
 thallium negative  CARDIAC SURG PROCEDURE UNLIST  12/2017  
 echo showed nl lv, ef 65%, gr 1 dd; Dr Mayuri Richards  CARDIAC SURG PROCEDURE UNLIST  09/2016  
 ex stress echo without clear wma Dr Yue Key  CT HEAD W CONT  11/07  
 negative  EGD    
 PUD on EGD, H pylori +txed 2009.  HX APPENDECTOMY  1980s  HX CATARACT REMOVAL Dr Judi Austin  HX COLONOSCOPY Dr. Kulwinder Edouard 2009 adenoma; Dr Taras Coulter   adenoma and tics; 17 sessile serrated and tubular adenomas and tics  HX HEENT    
 thyroid US negative  [address]  
 for fibroids  HX LOBECTOMY for cancer, Dr. Marceilno Ward 10/06. No adjuvant tx. She has another spot on the same side that is being followed every three months.  HX ORTHOPAEDIC Left foot ([place]juana) and knee surgery x2 Dr. Jodie Ortega. Right foot fx  Dr Jeanette Frey [address] [facility]    
 negative Social History Socioeconomic History  Marital status: SINGLE Spouse name: Not on file  Number of children: 0  
 Years of education: Not on file  Highest education level: Not on file Social Needs  Financial resource strain: Not on file  Food insecurity - worry: Not on file  Food insecurity - inability: Not on file  Transportation needs - medical: Not on file  Transportation needs - non-medical: Not on file Occupational History  Occupation: [employer] Tobacco Use  Smoking status: Former Smoker Packs/day: 0.50 Years: 20.00 Pack years: 10.00 Types: Cigarettes Last attempt to quit: 1990 Years since quittin.0  Smokeless tobacco: Never Used Substance and Sexual Activity  Alcohol use: No  
 Drug use: No  
 Sexual activity: Not Currently Other Topics Concern  Not on file Social History Narrative  Not on file Current Outpatient Medications Medication Sig  
 diclofenac (VOLTAREN) 1 % gel Apply  to affected area four (4) times daily. USE AS DIRECTED  traMADol (ULTRAM) 50 mg tablet Take 1 Tab by mouth every six (6) hours as needed for Pain. Max Daily Amount: 200 mg.  ergocalciferol (VITAMIN D2) 50,000 unit capsule Take 1 Cap by mouth every seven (7) days.  potassium chloride (K-DUR, KLOR-CON) 10 mEq tablet Take 1 Tab by mouth daily.  magnesium oxide (MAG-OX) 400 mg tablet Take 400 mg by mouth daily.  ascorbic acid (VITAMIN C) 250 mg tablet Take  by mouth.  beclomethasone (QVAR) 80 mcg/actuation inhaler Take 1 Puff by inhalation two (2) times a day.  albuterol (PROVENTIL HFA, VENTOLIN HFA) 90 mcg/actuation inhaler Take 2 Puffs by inhalation every four (4) hours as needed.  multivitamin (ONE A DAY) tablet Take 1 Tab by mouth daily.  lidocaine-EPINEPHrine (XYLOCAINE) 1 %-1:100,000 injection 10 mL by IntraDERMal route once for 1 dose. Indications: ADMINISTRATION OF LOCAL ANESTHESIA No current facility-administered medications for this visit. Allergies Allergen Reactions  Advair Diskus [Fluticasone-Salmeterol] Other (comments) Hoarseness  Naproxen Unable to Obtain  Nsaids (Non-Steroidal Anti-Inflammatory Drug) Other (comments) H/o PUD  Pcn [Penicillins] Rash Visit Vitals /76 Pulse 72 Temp 98 °F (36.7 °C) (Oral) Resp 14 Ht 5' 3\" (1.6 m) Wt 223 lb (101.2 kg) SpO2 97% BMI 39.50 kg/m² Lungs are clear. Heart showed regular rate rhythm. Abdomen was soft and nontender obese. There is tenderness on palpation of the right third MCP joint, mild swelling also in the second through fourth MCP area. There is no carpal tenderness or interphalangeal tenderness.  was good, no bruising noted. Range of motion for the wrist, elbows, shoulders normal bilaterally. Minimal tenderness on palpation of the right patella, no soft tissue tenderness in the thigh or calf. Mild suprapatellar swelling noted on the left side, she has abrasion injury in the skin overlying the left patella. Range of motion for both knees were normal.  No clear effusion in the joints Assessment and plan: 1. Soft tissue injuries after fall. We will get x-ray of the right hand for completeness. I gave her Voltaren gel along with tramadol refill. Call with an update 2.  Abrasion injury left patella. continue local care, no signs cellulitis Above conditions discussed at length and patient vocalized understanding. All questions answered to patient satisfaction

## 2019-01-24 ENCOUNTER — TELEPHONE (OUTPATIENT)
Dept: INTERNAL MEDICINE CLINIC | Age: 64
End: 2019-01-24

## 2019-01-24 NOTE — TELEPHONE ENCOUNTER
Pt notified, says hand is feeling a little better, but her knees are hurting so bad she can hardly walk.

## 2019-01-28 ENCOUNTER — TELEPHONE (OUTPATIENT)
Dept: INTERNAL MEDICINE CLINIC | Age: 64
End: 2019-01-28

## 2019-02-04 ENCOUNTER — TELEPHONE (OUTPATIENT)
Dept: INTERNAL MEDICINE CLINIC | Age: 64
End: 2019-02-04

## 2019-02-04 NOTE — TELEPHONE ENCOUNTER
Spoke with patient and advised her to contact [facility] available to schedule an ortho appointment. Mentioned to patient if she needs another referral placed to contact office.

## 2019-02-06 ENCOUNTER — OFFICE VISIT (OUTPATIENT)
Dept: ORTHOPEDIC SURGERY | Age: 64
End: 2019-02-06

## 2019-02-06 VITALS
DIASTOLIC BLOOD PRESSURE: 74 MMHG | TEMPERATURE: 97.3 F | RESPIRATION RATE: 14 BRPM | HEIGHT: 63 IN | HEART RATE: 73 BPM | OXYGEN SATURATION: 98 % | WEIGHT: 224.4 LBS | BODY MASS INDEX: 39.76 KG/M2 | SYSTOLIC BLOOD PRESSURE: 144 MMHG

## 2019-02-06 DIAGNOSIS — S80.01XA CONTUSION OF RIGHT KNEE, INITIAL ENCOUNTER: ICD-10-CM

## 2019-02-06 DIAGNOSIS — S80.02XA CONTUSION OF LEFT KNEE, INITIAL ENCOUNTER: ICD-10-CM

## 2019-02-06 DIAGNOSIS — S63.681A OTHER SPRAIN OF RIGHT THUMB, INITIAL ENCOUNTER: ICD-10-CM

## 2019-02-06 DIAGNOSIS — S83.91XA SPRAIN OF RIGHT KNEE, UNSPECIFIED LIGAMENT, INITIAL ENCOUNTER: ICD-10-CM

## 2019-02-06 DIAGNOSIS — M25.562 ACUTE PAIN OF LEFT KNEE: ICD-10-CM

## 2019-02-06 DIAGNOSIS — M25.561 ACUTE PAIN OF RIGHT KNEE: Primary | ICD-10-CM

## 2019-02-06 DIAGNOSIS — S83.92XA SPRAIN OF LEFT KNEE, UNSPECIFIED LIGAMENT, INITIAL ENCOUNTER: ICD-10-CM

## 2019-02-06 DIAGNOSIS — S63.91XA HAND SPRAIN, RIGHT, INITIAL ENCOUNTER: ICD-10-CM

## 2019-02-06 DIAGNOSIS — M18.11 ARTHRITIS OF CARPOMETACARPAL (CMC) JOINT OF RIGHT THUMB: ICD-10-CM

## 2019-02-06 NOTE — PROGRESS NOTES
1. Have you been to the ER, urgent care clinic since your last visit? Hospitalized since your last visit? No 
 
2. Have you seen or consulted any other health care providers outside of the [address] [place] [address] since your last visit? Include any pap smears or colon screening.  No

## 2019-02-06 NOTE — PROGRESS NOTES
Patient: Naz Dunaway                MRN: [id number]       SSN: [id number] YOB: 1955        AGE: 58 y.o. SEX: female PCP: Kavya Royal MD 
02/06/19 Chief Complaint Patient presents with  
 Hand Pain RIGHT HAND PAIN  
 Knee Pain JUDY KNEE PAIN  
 
HISTORY:  Naz Dunaway is a 58 y.o. female who is seen for right hand and bilateral knee pain. She reports falling and landing on an outstretched right hand on 1/15/19. She fell down 4 slippery steps and landed on a cold concrete surface striking her right hand and her knees. She sustained severe knee abrasions. Her left knee abrasions are still healing. She reports pain with deep knee bends and squating. She has been experiencing consistent knee pain since her injury. She notes pain with standing, walking, and stair climbing. She reports startup pain and difficulty with routine daily activities. She was seen by Dr. Chitra Bae onn 1/18/19 who ordered right hand xrays--revealed osteoarthritis but no fx. She is experiencing hand pain with gripping and pinching. She has been seeing Dr. Jolene Valencia for a left foot work related injury. Pain Assessment  2/6/2019 Location of Pain Knee;Hand  
Location Modifiers Left;Right Severity of Pain 7 Quality of Pain Aching Quality of Pain Comment - Duration of Pain Persistent Frequency of Pain Constant Aggravating Factors Bending;Standing;Walking Limiting Behavior -  
Relieving Factors Rest;Heat;Ice Relieving Factors Comment - Result of Injury No  
Work-Related Injury - How long out of work? -  
Type of Injury - Type of Injury Comment -  
 
Occupation, etc:  Ms. Fabio Brooks is a  for the [employer]. She has worked for the [employer] for 42 years. She is planning on retiring later this year. She lives alone in [place]. She worked also used to work in the [employer]. Current weight is 224 pounds. She is 5'3\" tall. Lab Results Component Value Date/Time Hemoglobin A1c 5.2 10/26/2017 09:58 AM  
 
Weight Metrics 2/6/2019 1/18/2019 11/20/2018 11/14/2018 10/16/2018 9/11/2018 8/1/2018 Weight 224 lb 6.4 oz 223 lb 219 lb 3.2 oz 218 lb 12.8 oz 218 lb 9.6 oz 216 lb 6.4 oz 218 lb BMI 39.75 kg/m2 39.5 kg/m2 38.83 kg/m2 38.76 kg/m2 38.72 kg/m2 38.33 kg/m2 38.62 kg/m2 Patient Active Problem List  
Diagnosis Code  Colon adenoma and diverticulosis Dr. Jim Barrett 2012 D12.6  Sleep apnea Dr. Tana Muse 2010 G47.30  Venous insufficiency I87.2  Hypovitaminosis D E55.9  
 Hx of cancer of lung 2006 s/p resection Z85.118  
 IFG (impaired fasting glucose) R73.01  
 COPD (chronic obstructive pulmonary disease) (HCC) J44.9  Pulmonary nodules R91.8  Hyperlipidemia E78.5  Severe obesity (BMI 35.0-39. 9) with comorbidity ([facility] [address] E66.01  
 
REVIEW OF SYSTEMS: All Below are Negative except: See HPI Constitutional: negative for fever, chills, and weight loss. Cardiovascular: negative for chest pain, claudication, leg swelling, SOB, RAHMAN Gastrointestinal: Negative for pain, N/V/C/D, Blood in stool or urine, dysuria,  hematuria, incontinence, pelvic pain. Musculoskeletal: See HPI Neurological: Negative for dizziness and weakness. Negative for headaches, Visual changes, confusion, seizures Phychiatric/Behavioral: Negative for depression, memory loss, substance  abuse. Extremities: Negative for hair changes, rash, or skin lesion changes. Hematologic: Negative for bleeding problems, bruising, pallor or swollen lymph  nodes Peripheral Vascular: No calf pain, no circulation deficits. Social History Socioeconomic History  Marital status: SINGLE Spouse name: Not on file  Number of children: 0  
 Years of education: Not on file  Highest education level: Not on file Social Needs  Financial resource strain: Not on file  Food insecurity - worry: Not on file  Food insecurity - inability: Not on file  Transportation needs - medical: Not on file  Transportation needs - non-medical: Not on file Occupational History  Occupation: [employer] Tobacco Use  Smoking status: Former Smoker Packs/day: 0.50 Years: 20.00 Pack years: 10.00 Types: Cigarettes Last attempt to quit: 1990 Years since quittin.1  Smokeless tobacco: Never Used Substance and Sexual Activity  Alcohol use: No  
 Drug use: No  
 Sexual activity: Not Currently Other Topics Concern  Not on file Social History Narrative  Not on file Allergies Allergen Reactions  Advair Diskus [Fluticasone-Salmeterol] Other (comments) Hoarseness  Naproxen Unable to Obtain  Nsaids (Non-Steroidal Anti-Inflammatory Drug) Other (comments) H/o PUD  Pcn [Penicillins] Rash Current Outpatient Medications Medication Sig  
 diclofenac (VOLTAREN) 1 % gel Apply  to affected area four (4) times daily. USE AS DIRECTED  ergocalciferol (VITAMIN D2) 50,000 unit capsule Take 1 Cap by mouth every seven (7) days.  potassium chloride (K-DUR, KLOR-CON) 10 mEq tablet Take 1 Tab by mouth daily.  magnesium oxide (MAG-OX) 400 mg tablet Take 400 mg by mouth daily.  ascorbic acid (VITAMIN C) 250 mg tablet Take  by mouth.  beclomethasone (QVAR) 80 mcg/actuation inhaler Take 1 Puff by inhalation two (2) times a day.  albuterol (PROVENTIL HFA, VENTOLIN HFA) 90 mcg/actuation inhaler Take 2 Puffs by inhalation every four (4) hours as needed.  multivitamin (ONE A DAY) tablet Take 1 Tab by mouth daily.  traMADol (ULTRAM) 50 mg tablet Take 1 Tab by mouth every six (6) hours as needed for Pain. Max Daily Amount: 200 mg.  
 lidocaine-EPINEPHrine (XYLOCAINE) 1 %-1:100,000 injection 10 mL by IntraDERMal route once for 1 dose. Indications: ADMINISTRATION OF LOCAL ANESTHESIA No current facility-administered medications for this visit. PHYSICAL EXAMINATION: 
Visit Vitals /74 Pulse 73 Temp 97.3 °F (36.3 °C) (Oral) Resp 14 Ht 5' 3\" (1.6 m) Wt 224 lb 6.4 oz (101.8 kg) SpO2 98% BMI 39.75 kg/m² Appearance: Alert, well appearing and pleasant patient who is in no distress, oriented to person, place/time, and who follows commands. HEENT: Sonya Miller hears well, does not require hearing aids. Her sclera of the eyes are non-icteric. She is breathing normally and no respiratory accessory muscle use is noted. No JVD present and Neck ROM within normal limits. Psychiatric: Affect and mood are appropriate. Oriented x3 Heart[de-identified]  S1, S2 without murmer, regular rhythm Lungs:  Breath sounds clear to auscultation Cardiovascular/Peripheral Vascular: Normal pulses to each foot. Integumentary: Abrasions as noted. Warm and normal color. No drainage. Gait: slight limp Sensory Exam: Intact/Normal Sensation Lymphatic: No evidence of Lymphedema Vascular:      
Pulses: palpable Varicosities none Wounds/Abrasion: None Present Neuro: Negative, no tremors ORTHO EXAMINATION: 
Examination Right Hand Skin Intact Deformity - Swelling - Tenderness + basal joint Finger flexion Full Finger extension Full Sensation Normal  
Capillary refill Normal  
Heberden's nodes large Dupuytren's - Examination Right knee Left knee Skin Intact 2 healing 1cm abrasions Range of motion 95-0 95-0 Effusion - - Medial joint line tenderness + + Lateral joint line tenderness + + Popliteal tenderness - -  
Osteophytes palpable - - Mikels - - Patella crepitus + + Anterior drawer - - Lateral laxity - - Medial laxity - - Varus deformity - -  
Valgus deformity - - Pretibial edema - - Calf tenderness - -  
 
RADIOGRAPHS: 
XR JUDY KNEE 2/6/19 MILLI IMPRESSION:  Three views - No fractures, no effusion, mild joint space narrowing, + osteophytes present. IKDC Grade B 
 
XR RIGHT HAND 1/18/19 HBV IMPRESSION: 
 Advanced degenerative changes as above. No clearly acute findings. 
-I have independently reviewed these images during this office visit. -Dr. Eri Ramsey IMPRESSION:  Three views - No fractures, severe  1st CMC joint space narrowing, IMPRESSION:   
  ICD-10-CM ICD-9-CM 1. Acute pain of right knee M25.561 719.46 AMB POC X-RAY KNEE 3 VIEW REFERRAL TO PHYSICAL THERAPY 2. Acute pain of left knee M25.562 719.46 AMB POC X-RAY KNEE 3 VIEW REFERRAL TO PHYSICAL THERAPY 3. Contusion of left knee, initial encounter S80. 02XA 924.11 REFERRAL TO PHYSICAL THERAPY 4. Contusion of right knee, initial encounter S80. 01XA 924.11 REFERRAL TO PHYSICAL THERAPY 5. Sprain of right knee, unspecified ligament, initial encounter S83. 91XA 844.9 REFERRAL TO PHYSICAL THERAPY 6. Sprain of left knee, unspecified ligament, initial encounter S83. 92XA 844.9 REFERRAL TO PHYSICAL THERAPY 7. Hand sprain, right, initial encounter S63. 91XA 842.10   
8. Other sprain of right thumb, initial encounter S63.681A 842.19   
9. Arthritis of carpometacarpal [place]) joint of right thumb M18.11 716.94 PLAN:  She will start a brief course of aquatic outpatient physical therapy. She will be referred for bariatric surgery consultation. She will follow up as needed. New antibiotic ointment dressing applied to her left knee abrasions. There is no need for surgery at this time. We discussed possible need for cortisone injection at some time in the future if pain continues.  
 
Scribed by Krissy Wade ([address]) as dictated by Sana Hodges MD

## 2019-02-12 ENCOUNTER — LAB ONLY (OUTPATIENT)
Dept: INTERNAL MEDICINE CLINIC | Age: 64
End: 2019-02-12

## 2019-02-12 DIAGNOSIS — Z11.59 NEED FOR HEPATITIS C SCREENING TEST: Primary | ICD-10-CM

## 2019-02-13 LAB
25(OH)D3+25(OH)D2 SERPL-MCNC: 17.4 NG/ML (ref 30–100)
BUN SERPL-MCNC: 16 MG/DL (ref 8–27)
BUN/CREAT SERPL: 22 (ref 12–28)
CALCIUM SERPL-MCNC: 9.3 MG/DL (ref 8.7–10.3)
CHLORIDE SERPL-SCNC: 105 MMOL/L (ref 96–106)
CHOLEST SERPL-MCNC: 199 MG/DL (ref 100–199)
CO2 SERPL-SCNC: 24 MMOL/L (ref 20–29)
CREAT SERPL-MCNC: 0.73 MG/DL (ref 0.57–1)
GLUCOSE SERPL-MCNC: 109 MG/DL (ref 65–99)
HDLC SERPL-MCNC: 84 MG/DL
INTERPRETATION, 910389: NORMAL
LDLC SERPL CALC-MCNC: 102 MG/DL (ref 0–99)
POTASSIUM SERPL-SCNC: 4.8 MMOL/L (ref 3.5–5.2)
SODIUM SERPL-SCNC: 144 MMOL/L (ref 134–144)
TRIGL SERPL-MCNC: 66 MG/DL (ref 0–149)
VLDLC SERPL CALC-MCNC: 13 MG/DL (ref 5–40)

## 2019-02-15 NOTE — PROGRESS NOTES
58 y.o. BLACK OR  female who presents for evaluation She saw Dr Tai Rey for the knee issue and no specific recs outside of water therapy and wt loss. She is asking for handicap sticker Denied any cardiovascular complaints and she has not seen Dr Mayuri Richards in [address] year No gi or gu complaints Remains on inhalers as below and no wheezing, chronic cough, sob Denies polyuria, polydipsia, nocturia, vision change. Not checking sugars at this time. She has done better with inc the salads and cutting back carbs, weight down 5 lbs on her scale at home Vitals 11/20/2018 11/14/2018 10/16/2018 9/11/2018 Weight 219 lb 3.2 oz 218 lb 12.8 oz 218 lb 9.6 oz 216 lb 6.4 oz  
 
IF 2/19 Past Medical History:  
Diagnosis Date  Asthma Dr. Paula Boyd  Cancer ([facility] [address] Lung Cancer s/p right lobectomy Dr Carmelina Bradley 10/06  Chronic obstructive pulmonary disease ([facility] [address]   
 and right lung nodules, stable on serial CT last 6/14  Colon adenoma 2009 Dr. Duglas Hylton; adenoma and diverticulosis 9/12 Dr. Kelvin Pacheco; 12/26/17  FHx: breast cancer  Glaucoma Dr Judi Austin  Hearing loss  Hyperlipidemia 5/14/2018  Hypovitaminosis D 2011  IFG (impaired fasting glucose)  Morbid obesity ([facility] [address] peak weight 209 lbs, bmi 37 from 2/14  PUD (peptic ulcer disease)   
 h pylori tx'ed  Pulmonary nodule 06/2014  
 4 nodules <4mm; stable 5/18  Sleep apnea   
 on CPAP, Dr. Seth Gamboa 2010  Venous insufficiency Past Surgical History:  
Procedure Laterality Date  CARDIAC SURG PROCEDURE UNLIST  11/07  
 thallium negative  CARDIAC SURG PROCEDURE UNLIST  12/2017  
 echo showed nl lv, ef 65%, gr 1 dd; Dr Mayuri Richards  CARDIAC SURG PROCEDURE UNLIST  09/2016  
 ex stress echo without clear wma Dr Yue Key  CT HEAD W CONT  11/07  
 negative  EGD    
 PUD on EGD, H pylori +txed 2009.  HX APPENDECTOMY  1980s  HX CATARACT REMOVAL Dr Judi Austin  HX COLONOSCOPY Dr. Camila Menezes  adenoma; Dr Rhoda Cage   adenoma and tics; 17 sessile serrated and tubular adenomas and tics  HX HEENT    
 thyroid US negative  [address] [facility]  
 for fibroids  HX LOBECTOMY for cancer, Dr. Juany Galdamez 10/06. No adjuvant tx. She has another spot on the same side that is being followed every three months.  HX ORTHOPAEDIC Left foot ([place]) and knee surgery x2 Dr. Sellers Ranks. Right foot fx  Dr Cristopher Riggins [address] [facility]    
 negative Social History Socioeconomic History  Marital status: SINGLE Spouse name: Not on file  Number of children: 0  
 Years of education: Not on file  Highest education level: Not on file Social Needs  Financial resource strain: Not on file  Food insecurity - worry: Not on file  Food insecurity - inability: Not on file  Transportation needs - medical: Not on file  Transportation needs - non-medical: Not on file Occupational History  Occupation: [employer] Tobacco Use  Smoking status: Former Smoker Packs/day: 0.50 Years: 20.00 Pack years: 10.00 Types: Cigarettes Last attempt to quit: 1990 Years since quittin.1  Smokeless tobacco: Never Used Substance and Sexual Activity  Alcohol use: No  
 Drug use: No  
 Sexual activity: Not Currently Other Topics Concern  Not on file Social History Narrative  Not on file Family History Problem Relation Age of Onset  Breast Cancer Mother  Other Brother SLE  
 Other Brother SLE  
 Other Brother SLE  Hypertension Father Immunization History Administered Date(s) Administered  TD Vaccine 1990 Current Outpatient Medications Medication Sig  ergocalciferol (VITAMIN D2) 50,000 unit capsule Take 1 Cap by mouth every seven (7) days.   
 potassium chloride (K-DUR, KLOR-CON) 10 mEq tablet Take 1 Tab by mouth daily.  
 magnesium oxide (MAG-OX) 400 mg tablet Take 400 mg by mouth daily.  ascorbic acid (VITAMIN C) 250 mg tablet Take 250 mg by mouth daily.  beclomethasone (QVAR) 80 mcg/actuation inhaler Take 1 Puff by inhalation two (2) times a day.  albuterol (PROVENTIL HFA, VENTOLIN HFA) 90 mcg/actuation inhaler Take 2 Puffs by inhalation every four (4) hours as needed.  multivitamin (ONE A DAY) tablet Take 1 Tab by mouth daily.  diclofenac (VOLTAREN) 1 % gel Apply  to affected area four (4) times daily. USE AS DIRECTED  traMADol (ULTRAM) 50 mg tablet Take 1 Tab by mouth every six (6) hours as needed for Pain. Max Daily Amount: 200 mg.  
 lidocaine-EPINEPHrine (XYLOCAINE) 1 %-1:100,000 injection 10 mL by IntraDERMal route once for 1 dose. Indications: ADMINISTRATION OF LOCAL ANESTHESIA No current facility-administered medications for this visit. Allergies Allergen Reactions  Naproxen Unable to Obtain and Other (comments)  Penicillins Rash, Hives and Swelling  Advair Diskus [Fluticasone-Salmeterol] Other (comments) Hoarseness  Nsaids (Non-Steroidal Anti-Inflammatory Drug) Other (comments) H/o PUD REVIEW OF SYSTEMS: mammo 11/17, hyst, colo Dr Nicole Miner 9/12 Ophtho  no vision change or eye pain Oral  no mouth pain, tongue or tooth problems Ears  no hearing loss, ear pain, fullness, no swallowing problems Cardiac  no CP, PND, orthopnea, palpitations or syncope Chest  no breast masses Resp  no wheezing, chronic coughing, dyspnea GI  no heartburn, nausea, vomiting, change in bowel habits, bleeding, hemorrhoids Urinary  no dysuria, hematuria, flank pain, urgency, frequency Genitals  no genital lesions, discharge, masses, ulceration, warts Ortho  no swelling, dec ROM, myalgias Endo - no polyuria, polydipsia, nocturia, hot flashes Visit Vitals /74 (BP 1 Location: Left arm, BP Patient Position: Sitting) Pulse 76 Temp 97.4 °F (36.3 °C) (Oral) Resp 16 Ht 5' 3\" (1.6 m) Wt 219 lb 3.2 oz (99.4 kg) SpO2 98% BMI 38.83 kg/m² A&O x3 Affect is appropriate. Mood stable No apparent distress Anicteric, no JVD, adenopathy or thyromegaly. No carotid bruits or radiated murmur Chest wall tenderness right lower ribcage region, no bruising Lungs clear to auscultation, no wheezes or rales Heart showed regular rate and rhythm. No murmur, rubs, gallops Abdomen soft nontender, no hepatosplenomegaly or masses. Extremities with trace pedal edema LABS From 8/12 showed   gluc 96,   cr 0.63, gfr 115, alt 10,           chol 197, tg 77, hdl 82, ldl-c 100, wbc 5.4, hb 12.6, plt 242, tsh 0.81, ua neg From 3/14 showed   gluc 105, cr 0.58, gfr 101,     hba1c 5.8 From 6/15 showed   gluc 97,   cr 0.73, gfr>60,  alt<5,  hba1c 5.6, chol 192, tg 56, hdl 73, ldl-c 108, wbc 5.4, hb 12.3, plt 249, tsh 0.96, vit d 12.7, hiv neg From 11/15 showed                            ddimer neg From 5/16 showed   gluc 99,   cr 0.62, gfr 112,          wbc 5.9, hb 12.5, plt 258, tsh 1.04, ua neg,     proBNP 29 From 10/17 showed gluc 100, cr 0.71, gfr 91,   alt 16, hba1c 5.2, chol 212, tg 53, hdl 92, ldl-c 109, wbc 5.4, hb 12.9, plt 253,             vit d 12.8 From 4/18 showed   gluc 100, cr 0.57, gfr 99,          wbc 6.5, hb 11.6, plt 274, uric 6.0, esr 19 From 8/18 showed   gluc 84,   cr 0.50, gfr 103, alt 6,           vit d 19.0 Results for orders placed or performed in visit on 02/12/19 METABOLIC PANEL, BASIC Result Value Ref Range Glucose 109 (H) 65 - 99 mg/dL BUN 16 8 - 27 mg/dL Creatinine 0.73 0.57 - 1.00 mg/dL GFR est non-AA 87 >59 mL/min/1.73 GFR est  >59 mL/min/1.73  
 BUN/Creatinine ratio 22 12 - 28 Sodium 144 134 - 144 mmol/L Potassium 4.8 3.5 - 5.2 mmol/L Chloride 105 96 - 106 mmol/L  
 CO2 24 20 - 29 mmol/L Calcium 9.3 8.7 - 10.3 mg/dL LIPID PANEL Result Value Ref Range Cholesterol, total 199 100 - 199 mg/dL Triglyceride 66 0 - 149 mg/dL HDL Cholesterol 84 >39 mg/dL VLDL, calculated 13 5 - 40 mg/dL LDL, calculated 102 (H) 0 - 99 mg/dL VITAMIN D, 25 HYDROXY Result Value Ref Range VITAMIN D, 25-HYDROXY 17.4 (L) 30.0 - 100.0 ng/mL CVD REPORT Result Value Ref Range INTERPRETATION Note Patient Active Problem List  
Diagnosis Code  Colon adenoma and diverticulosis Dr. Kelvin Pacheco 2012 D12.6  Sleep apnea Dr. Seth Gamboa 2010 G47.30  Venous insufficiency I87.2  Hypovitaminosis D E55.9  
 Hx of cancer of lung 2006 s/p resection Z85.118  
 IFG (impaired fasting glucose) R73.01  
 COPD (chronic obstructive pulmonary disease) (HCC) J44.9  Pulmonary nodules R91.8  Hyperlipidemia E78.5  Severe obesity (BMI 35.0-39. 9) with comorbidity ([facility] [address] E66.01 Assessment and plan: 1. Morbid obesity. Previously declined eagle supps, medically supervised wt loss program, bariatrics. Lifestyle and dietary measures, portion control. Intermittent fasting discussed at length. Explained the concepts in detail. Went over possible physiologic changes that could occur and how that would possibly impact her situation in a positive way. Discussed 16:8 program in particular. We also went over the differences between hunger and nicole hypoglycemia. She will do some more research and consider implementing in the near future, standard handout given 2. Colon adenoma and diverticulosis. Fiber, colo 2022 3. MIMI on cpap. F/U Dr. Seth Gamboa 4. Hypovit d. Restart supplementation 5. H/O lung ca and lung nodules. CT 5/19 
6. IFG. Wt loss would be ideal.  Lifestyle and dietary measures as above RTC 5/19 Above conditions discussed at length and patient vocalized understanding. All questions answered to patient satisfaction

## 2019-02-19 ENCOUNTER — TELEPHONE (OUTPATIENT)
Dept: INTERNAL MEDICINE CLINIC | Age: 64
End: 2019-02-19

## 2019-02-19 ENCOUNTER — OFFICE VISIT (OUTPATIENT)
Dept: INTERNAL MEDICINE CLINIC | Age: 64
End: 2019-02-19

## 2019-02-19 VITALS
HEIGHT: 63 IN | BODY MASS INDEX: 38.84 KG/M2 | SYSTOLIC BLOOD PRESSURE: 138 MMHG | WEIGHT: 219.2 LBS | HEART RATE: 76 BPM | DIASTOLIC BLOOD PRESSURE: 74 MMHG | RESPIRATION RATE: 16 BRPM | TEMPERATURE: 97.4 F | OXYGEN SATURATION: 98 %

## 2019-02-19 DIAGNOSIS — E55.9 HYPOVITAMINOSIS D: ICD-10-CM

## 2019-02-19 DIAGNOSIS — D12.6 COLON ADENOMA: ICD-10-CM

## 2019-02-19 DIAGNOSIS — Z85.118 HX OF CANCER OF LUNG: ICD-10-CM

## 2019-02-19 DIAGNOSIS — R91.8 PULMONARY NODULES: ICD-10-CM

## 2019-02-19 DIAGNOSIS — J44.9 CHRONIC OBSTRUCTIVE PULMONARY DISEASE, UNSPECIFIED COPD TYPE (HCC): ICD-10-CM

## 2019-02-19 DIAGNOSIS — E78.5 HYPERLIPIDEMIA, UNSPECIFIED HYPERLIPIDEMIA TYPE: ICD-10-CM

## 2019-02-19 DIAGNOSIS — R73.01 IFG (IMPAIRED FASTING GLUCOSE): ICD-10-CM

## 2019-02-19 DIAGNOSIS — G47.33 OBSTRUCTIVE SLEEP APNEA SYNDROME: Primary | ICD-10-CM

## 2019-02-19 DIAGNOSIS — E66.01 SEVERE OBESITY (BMI 35.0-39.9) WITH COMORBIDITY (HCC): ICD-10-CM

## 2019-02-19 RX ORDER — ERGOCALCIFEROL 1.25 MG/1
50000 CAPSULE ORAL
Qty: 13 CAP | Refills: 3 | Status: SHIPPED | OUTPATIENT
Start: 2019-02-19 | End: 2019-09-05 | Stop reason: SDUPTHER

## 2019-02-19 NOTE — PATIENT INSTRUCTIONS
Patient was given a copy of the Advanced Medical Directive Form, and understands to bring it in once completed. Health Maintenance Due Topic Date Due  
 Hepatitis C Screening  1955  Shingrix Vaccine Age 50> (1 of 2) 05/04/2004  Influenza Age 5 to Adult  08/01/2018  Bone Densitometry (Dexa) Screening  05/04/2019

## 2019-02-19 NOTE — PROGRESS NOTES
Chief Complaint Patient presents with  Cholesterol Problem  
  follow up  Labs  
  done 02-12-19 1. Have you been to the ER, urgent care clinic since your last visit? Hospitalized since your last visit? No 
 
2. Have you seen or consulted any other health care providers outside of the [address] [place] [address] since your last visit? Include any pap smears or colon screening. No 
 
Patient was given a copy of the Advanced Directive and understands to bring it in once completed. Health Maintenance Due Topic Date Due  
 Hepatitis C Screening  1955  Shingrix Vaccine Age 50> (1 of 2) 05/04/2004  Influenza Age 5 to Adult  08/01/2018  Bone Densitometry (Dexa) Screening  05/04/2019

## 2019-03-19 ENCOUNTER — OFFICE VISIT (OUTPATIENT)
Dept: ORTHOPEDIC SURGERY | Age: 64
End: 2019-03-19

## 2019-03-19 VITALS
BODY MASS INDEX: 39.23 KG/M2 | SYSTOLIC BLOOD PRESSURE: 141 MMHG | OXYGEN SATURATION: 99 % | WEIGHT: 221.4 LBS | RESPIRATION RATE: 16 BRPM | HEART RATE: 74 BPM | TEMPERATURE: 97.5 F | HEIGHT: 63 IN | DIASTOLIC BLOOD PRESSURE: 73 MMHG

## 2019-03-19 DIAGNOSIS — S92.324G CLOSED NONDISPLACED FRACTURE OF SECOND METATARSAL BONE OF RIGHT FOOT WITH DELAYED HEALING, SUBSEQUENT ENCOUNTER: ICD-10-CM

## 2019-03-19 DIAGNOSIS — S92.301G DELAYED UNION OF FRACTURE OF METATARSAL BONE OF RIGHT FOOT: Primary | ICD-10-CM

## 2019-03-19 NOTE — PROGRESS NOTES
AMBULATORY PROGRESS NOTE      Patient: Benoit Shaver             MRN: [id number]     SSN: [id number] Body mass index is 39.22 kg/m². YOB: 1955     AGE: 58 y.o. EX: female    PCP: Bard Edith MD     IMPRESSION/DIAGNOSIS AND TREATMENT PLAN     DIAGNOSES  1. Delayed union of fracture of metatarsal bone of right foot    2. Closed nondisplaced fracture of second metatarsal bone of right foot with delayed healing, subsequent encounter        Orders Placed This Encounter    [[id number]] Foot Min 3V      Benoit Shaver understands her diagnoses and the proposed plan. Plan:    1) Continue activity as tolerated  2) Follow up with Dr. Florian Sánchez for knees    RTO - PRN    Benoit Shaver IS A 58 y.o. female who presents to my outpatient office for follow up of a closed nondisplaced fracture of the second metatarsal of the right foot. At the last visit, Dr. Vane Walker provided a work note, instructed the patient to continue activity modification as directed, to continue using the [address] brace, to continue performing gentle ROM exercises, to continue supplementing with calcium and vitamin D, and to anticipate a CT scan if condition does not improve. Patient states that she is doing much better and has much less pain. She states that she did sustain a fall on her knees and has an appointment with Dr. Florian Sánchez on tomorrow for her knees. The patient works at [employer]. Visit Vitals  /73   Pulse 74   Temp 97.5 °F (36.4 °C) (Oral)   Resp 16   Ht 5' 3\" (1.6 m)   Wt 221 lb 6.4 oz (100.4 kg)   SpO2 99%   BMI 39.22 kg/m²       Appearance: Alert, well appearing and pleasant patient who is in no distress, oriented to person, place/time, and who follows commands. This patient is accompanied in the examination room by her self. Dementia: no dementia  Psychiatric: Affect and mood are appropriate.  Patient arrives to office via: without assistive device  HEENT: Head normocephalic & atraumatic. Both pupils are round, non icteric sclera   Eye: EOM are intact and sclera are clear    Neck: ROM WNL and JVD neck is not present     Hearings Intact, does not require hearing aid device  Respiratory: Breathing is unlabored without accessory chest muscle use  Cardiovascular/Peripheral Vascular: Normal Pulses to each hand and foot    Right ANKLE and FOOT         Gait: slow  Tenderness: mild tenderness to second metatarsal.                          Mild tenderness to the midfoot. Cutaneous: No rashes, skin patches, wounds, or abrasions to the lower legs                                Warm and Normal color. No regions of expressible drainage.                                Medial Border of [place] Region: absent                                Skin color, texture, turgor normal. Normal.                                Minimal swelling over the midfoot. Joint Motion: ROM Ankle:Normal , Hindfoot: (ST,TN,CC Normal}, Forefoot toes:Decreased  Neurologic Exam: Neuro: Motor: normal 5/5 strength in all tested muscle groups and Sensory : no sensory deficits noted. Contractures: Gastrocnemius or Achilles Contractures absent  Joint / Tendon Stability: No Ankle or Subtalar instability or joint laxity.                                            No peroneal sublux ability or dislocation  Alignment: Slight Pes Planus  Vascular: Normal Pulses/ NL Capillary refill, No evidence of DVT seen on physical exam.                        No calf swelling, no tenderness to calf muscles.   Lymphatic:  No Evidence of Lymphedema.     CHART REVIEW     Past Medical History:   Diagnosis Date    Asthma     Dr. Pedro Ion [facility])     Lung Cancer s/p right lobectomy Dr Letha Perez 10/06    Chronic obstructive pulmonary disease ([address]     and right lung nodules, stable on serial CT last 6/14    Colon adenoma     2009 Dr. Bennett Lamb; adenoma and diverticulosis 9/12 Dr. Rose Glaser; 12/26/17    FHx: breast cancer     Glaucoma Dr Bri Ashby    Hearing loss     Hyperlipidemia 5/14/2018    Hypovitaminosis D 2011    IFG (impaired fasting glucose)     Morbid obesity (HCC)     IF 2/19 start weight 219 lbs    PUD (peptic ulcer disease)     h pylori tx'ed    Pulmonary nodule 06/2014    4 nodules <4mm; stable 5/18    Sleep apnea     on CPAP, Dr. Mono Pal 2010    Venous insufficiency      Current Outpatient Medications   Medication Sig    ergocalciferol (VITAMIN D2) 50,000 unit capsule Take 1 Cap by mouth every seven (7) days.  diclofenac (VOLTAREN) 1 % gel Apply  to affected area four (4) times daily. USE AS DIRECTED    traMADol (ULTRAM) 50 mg tablet Take 1 Tab by mouth every six (6) hours as needed for Pain. Max Daily Amount: 200 mg.  potassium chloride (K-DUR, KLOR-CON) 10 mEq tablet Take 1 Tab by mouth daily.  magnesium oxide (MAG-OX) 400 mg tablet Take 400 mg by mouth daily.  ascorbic acid (VITAMIN C) 250 mg tablet Take 250 mg by mouth daily.  beclomethasone (QVAR) 80 mcg/actuation inhaler Take 1 Puff by inhalation two (2) times a day.  albuterol (PROVENTIL HFA, VENTOLIN HFA) 90 mcg/actuation inhaler Take 2 Puffs by inhalation every four (4) hours as needed.  multivitamin (ONE A DAY) tablet Take 1 Tab by mouth daily.  lidocaine-EPINEPHrine (XYLOCAINE) 1 %-1:100,000 injection 10 mL by IntraDERMal route once for 1 dose. Indications: ADMINISTRATION OF LOCAL ANESTHESIA     No current facility-administered medications for this visit.       Allergies   Allergen Reactions    Naproxen Unable to Obtain and Other (comments)    Penicillins Rash, Hives and Swelling    Advair Diskus [Fluticasone Propion-Salmeterol] Other (comments)     Hoarseness    Nsaids (Non-Steroidal Anti-Inflammatory Drug) Other (comments)     H/o PUD     Past Surgical History:   Procedure Laterality Date    CARDIAC SURG PROCEDURE UNLIST  11/07    thallium negative    CARDIAC SURG PROCEDURE UNLIST  12/2017    echo showed nl lv, ef 65%, gr 1 dd; Dr Johnathan Yoon  2016    ex stress echo without clear wma Dr Dahlia Haynes      negative    EGD      PUD on EGD, H pylori +txed .  Fran Massed Dr Larita Schillings Dr. Sara Lesch  adenoma; Dr Reji Johnson   adenoma and tics; 17 sessile serrated and tubular adenomas and tics    HX HEENT      thyroid US negative     HX HYSTERECTOMY      for fibroids    HX LOBECTOMY      for cancer, Dr. Halima Espinosa 10/06. No adjuvant tx. She has another spot on the same side that is being followed every three months.  HX ORTHOPAEDIC      Left foot ([place]) and knee surgery x2 Dr. Thania Rosen. Right foot fx  Dr Cameron Lewis [address]T [address]      negative     Social History     Occupational History    Occupation: [employer]   Tobacco Use    Smoking status: Former Smoker     Packs/day: 0.50     Years: 20.00     Pack years: 10.00     Types: Cigarettes     Last attempt to quit: 1990     Years since quittin.2    Smokeless tobacco: Never Used   Substance and Sexual Activity    Alcohol use: No    Drug use: No    Sexual activity: Not Currently     Family History   Problem Relation Age of Onset    Breast Cancer Mother     Other Brother         SLE    Other Brother         SLE    Other Brother         SLE    Hypertension Father         REVIEW OF SYSTEMS : 3/19/2019  ALL BELOW ARE Negative except : SEE HPI     Constitutional: Negative for fever, chills and weight loss. Neg Weight Loss  Cardiovascular: Negative for chest pain, claudication and leg swelling. SOB, RAHMAN   Gastrointestinal/Urological: Negative for  pain, N/V/D/C, Blood in stool or urine,dysuria  Hematuria, Incontinence, pelvic pain  Musculoskeletal: see HPI.   Neurological: Negative for dizziness and weakness, headaches,Visual Changes   Confusion,  Or Seizures,   Psychiatric/Behavioral: Negative for depression, memory loss and substance abuse. Extremities:  Negative for hair changes, rash or skin lesion changes. Hematologic: Negative for Bleeding problems, bruising, pallor or swollen lymph nodes. Peripheral Vascular: No calf pain, vascular vein tenderness to calf pain              No calf throbbing, posterior knee throbbing pain     DIAGNOSTIC IMAGING     X-rays of the right foot          Bones: healed right 2nd metatarsal Fx // NO dislocations. No focal osteolytic or osteoblastic process                          Bone Spurs: Bone spur noted to calcaneus (inferior and posterior aspects)  Foot Alignment: WNL  Joint Condition: Great toe OA noted  Soft Tissues: Normal, No radiopaque foreign body and No abnormal calcific densities to soft tissues                        No ankle joint effusion in lateral projection.   Mineralization: Suggests  no Osteopenia    Jaret Dallas PA-C

## 2019-03-20 ENCOUNTER — OFFICE VISIT (OUTPATIENT)
Dept: ORTHOPEDIC SURGERY | Age: 64
End: 2019-03-20

## 2019-03-20 VITALS
TEMPERATURE: 98 F | BODY MASS INDEX: 39.16 KG/M2 | DIASTOLIC BLOOD PRESSURE: 76 MMHG | WEIGHT: 221 LBS | HEIGHT: 63 IN | SYSTOLIC BLOOD PRESSURE: 139 MMHG | OXYGEN SATURATION: 100 % | RESPIRATION RATE: 15 BRPM | HEART RATE: 83 BPM

## 2019-03-20 DIAGNOSIS — M25.562 ACUTE PAIN OF LEFT KNEE: ICD-10-CM

## 2019-03-20 DIAGNOSIS — S80.02XD CONTUSION OF LEFT KNEE, SUBSEQUENT ENCOUNTER: ICD-10-CM

## 2019-03-20 DIAGNOSIS — S80.01XD CONTUSION OF RIGHT KNEE, SUBSEQUENT ENCOUNTER: ICD-10-CM

## 2019-03-20 DIAGNOSIS — S83.92XD SPRAIN OF LEFT KNEE, UNSPECIFIED LIGAMENT, SUBSEQUENT ENCOUNTER: ICD-10-CM

## 2019-03-20 DIAGNOSIS — S83.91XD SPRAIN OF RIGHT KNEE, UNSPECIFIED LIGAMENT, SUBSEQUENT ENCOUNTER: ICD-10-CM

## 2019-03-20 DIAGNOSIS — M25.561 ACUTE PAIN OF RIGHT KNEE: Primary | ICD-10-CM

## 2019-03-20 RX ORDER — DICLOFENAC SODIUM 20 MG/G
2 SOLUTION TOPICAL
Qty: 1 BOTTLE | Refills: 1 | Status: SHIPPED | OUTPATIENT
Start: 2019-03-20 | End: 2020-09-28

## 2019-03-20 NOTE — PROGRESS NOTES
Patient: Rosario Stringer                MRN: [id number]       SSN: [id number] YOB: 1955        AGE: 58 y.o. SEX: female PCP: Taya Lundy MD 
03/20/19 Chief Complaint Patient presents with  Knee Pain JUDY KNEE PAIN  
 
HISTORY:  Rosario Stringer is a 58 y.o. female who is seen for right hand and bilateral L>R knee pain. She reports falling and landing on an outstretched right hand on 1/15/19. She fell down 4 slippery steps and landed on a cold concrete surface striking her right hand and her knees. She sustained severe knee abrasions. Her left knee abrasions are still healing. She reports pain with deep knee bends and squating. She has been experiencing consistent knee pain since her injury. She notes pain with standing, walking, and stair climbing. She reports startup pain and difficulty with routine daily activities. She was seen by Dr. Ziggy Ruano onn 1/18/19 who ordered right hand xrays--revealed osteoarthritis but no fx. She is experiencing hand pain with gripping and pinching. She feels a sharp sticking in her left knee with flexion. She does not like cortisone injections since they create hypopigmentation - previously received foot cortisone injections by Dr. Abbey Umana. She has been seeing Dr. Moriah Kelly for a left foot work related injury. Pain Assessment  3/20/2019 Location of Pain Knee Location Modifiers Left;Right Severity of Pain 6 Quality of Pain Aching; Fronie Wilmer Quality of Pain Comment - Duration of Pain Persistent Frequency of Pain Constant Aggravating Factors Bending;Standing;Walking Limiting Behavior -  
Relieving Factors Rest  
Relieving Factors Comment - Result of Injury No  
Work-Related Injury - How long out of work? -  
Type of Injury - Type of Injury Comment -  
 
Occupation, etc:  Ms. Franchesca William is a  for the [employer]. She has worked for the [employer] for 42 years.  She is planning on retiring later this year. She lives alone in [place]. She worked also used to work in the [employer] at [email or web address]. Current weight is 224 pounds. She is 5'3\" tall. Lab Results Component Value Date/Time Hemoglobin A1c 5.2 10/26/2017 09:58 AM  
 
Weight Metrics 3/20/2019 3/19/2019 2/19/2019 2/6/2019 1/18/2019 11/20/2018 11/14/2018 Weight 221 lb 221 lb 6.4 oz 219 lb 3.2 oz 224 lb 6.4 oz 223 lb 219 lb 3.2 oz 218 lb 12.8 oz BMI 39.15 kg/m2 39.22 kg/m2 38.83 kg/m2 39.75 kg/m2 39.5 kg/m2 38.83 kg/m2 38.76 kg/m2 Patient Active Problem List  
Diagnosis Code  Colon adenoma and diverticulosis Dr. Ta Ortiz 2012 D12.6  Sleep apnea Dr. Eduardo Luke 2010 G47.30  Venous insufficiency I87.2  Hypovitaminosis D E55.9  
 Hx of cancer of lung 2006 s/p resection Z85.118  
 IFG (impaired fasting glucose) R73.01  
 COPD (chronic obstructive pulmonary disease) ([facility]) J44.9  Pulmonary nodules R91.8  Hyperlipidemia E78.5  Severe obesity (BMI 35.0-39. 9) with comorbidity ([address] E66.01  
 
REVIEW OF SYSTEMS: All Below are Negative except: See HPI Constitutional: negative for fever, chills, and weight loss. Cardiovascular: negative for chest pain, claudication, leg swelling, SOB, RAHMAN Gastrointestinal: Negative for pain, N/V/C/D, Blood in stool or urine, dysuria,  hematuria, incontinence, pelvic pain. Musculoskeletal: See HPI Neurological: Negative for dizziness and weakness. Negative for headaches, Visual changes, confusion, seizures Phychiatric/Behavioral: Negative for depression, memory loss, substance  abuse. Extremities: Negative for hair changes, rash, or skin lesion changes. Hematologic: Negative for bleeding problems, bruising, pallor or swollen lymph  nodes Peripheral Vascular: No calf pain, no circulation deficits. Social History Socioeconomic History  Marital status: SINGLE Spouse name: Not on file  Number of children: 0  
 Years of education: Not on file  Highest education level: Not on file Occupational History  Occupation: [employer] Social Needs  Financial resource strain: Not on file  Food insecurity:  
  Worry: Not on file Inability: Not on file  Transportation needs:  
  Medical: Not on file Non-medical: Not on file Tobacco Use  Smoking status: Former Smoker Packs/day: 0.50 Years: 20.00 Pack years: 10.00 Types: Cigarettes Last attempt to quit: 1990 Years since quittin.2  Smokeless tobacco: Never Used Substance and Sexual Activity  Alcohol use: No  
 Drug use: No  
 Sexual activity: Not Currently Lifestyle  Physical activity:  
  Days per week: Not on file Minutes per session: Not on file  Stress: Not on file Relationships  Social connections:  
  Talks on phone: Not on file Gets together: Not on file Attends Jewish service: Not on file Active member of club or organization: Not on file Attends meetings of clubs or organizations: Not on file Relationship status: Not on file  Intimate partner violence:  
  Fear of current or ex partner: Not on file Emotionally abused: Not on file Physically abused: Not on file Forced sexual activity: Not on file Other Topics Concern  Not on file Social History Narrative  Not on file Allergies Allergen Reactions  Naproxen Unable to Obtain and Other (comments)  Penicillins Rash, Hives and Swelling  Advair Diskus [Fluticasone Propion-Salmeterol] Other (comments) Hoarseness  Nsaids (Non-Steroidal Anti-Inflammatory Drug) Other (comments) H/o PUD Current Outpatient Medications Medication Sig  
 diclofenac sodium (PENNSAID) 20 mg/gram /actuation(2 %) sopm 2 Pump(s) by Apply Externally route two (2) times daily as needed for Pain.  ergocalciferol (VITAMIN D2) 50,000 unit capsule Take 1 Cap by mouth every seven (7) days.  diclofenac (VOLTAREN) 1 % gel Apply  to affected area four (4) times daily. USE AS DIRECTED  traMADol (ULTRAM) 50 mg tablet Take 1 Tab by mouth every six (6) hours as needed for Pain. Max Daily Amount: 200 mg.  potassium chloride (K-DUR, KLOR-CON) 10 mEq tablet Take 1 Tab by mouth daily.  magnesium oxide (MAG-OX) 400 mg tablet Take 400 mg by mouth daily.  ascorbic acid (VITAMIN C) 250 mg tablet Take 250 mg by mouth daily.  beclomethasone (QVAR) 80 mcg/actuation inhaler Take 1 Puff by inhalation two (2) times a day.  albuterol (PROVENTIL HFA, VENTOLIN HFA) 90 mcg/actuation inhaler Take 2 Puffs by inhalation every four (4) hours as needed.  multivitamin (ONE A DAY) tablet Take 1 Tab by mouth daily.  lidocaine-EPINEPHrine (XYLOCAINE) 1 %-1:100,000 injection 10 mL by IntraDERMal route once for 1 dose. Indications: ADMINISTRATION OF LOCAL ANESTHESIA No current facility-administered medications for this visit. PHYSICAL EXAMINATION: 
Visit Vitals /76 Pulse 83 Temp 98 °F (36.7 °C) (Oral) Resp 15 Ht 5' 3\" (1.6 m) Wt 221 lb (100.2 kg) SpO2 100% BMI 39.15 kg/m² ORTHO EXAMINATION: 
Examination Right Hand Skin Intact Deformity - Swelling - Tenderness + basal joint Finger flexion Full Finger extension Full Sensation Normal  
Capillary refill Normal  
Heberden's nodes large Dupuytren's - Examination Right knee Left knee Skin Intact 2 healing 1cm abrasions Range of motion 95-0 95-0 Effusion - - Medial joint line tenderness + + Lateral joint line tenderness + + Popliteal tenderness - -  
Osteophytes palpable - - Mikels - - Patella crepitus + + Anterior drawer - - Lateral laxity - - Medial laxity - - Varus deformity - -  
Valgus deformity - - Pretibial edema - - Calf tenderness - -  
 
RADIOGRAPHS: 
XR JUDY KNEE 2/6/19 MILLI IMPRESSION:  Three views - No fractures, no effusion, mild joint space narrowing, + osteophytes present. IKDC Grade B 
 
XR RIGHT HAND 1/18/19 HBV IMPRESSION: 
Advanced degenerative changes as above. No clearly acute findings. 
-I have independently reviewed these images during this office visit. -Dr. Last Floor IMPRESSION:  Three views - No fractures, severe  1st CMC joint space narrowing, IMPRESSION:   
  ICD-10-CM ICD-9-CM 1. Acute pain of right knee M25.561 719.46 diclofenac sodium (PENNSAID) 20 mg/gram /actuation(2 %) sop  
   REFERRAL TO PHYSICAL THERAPY 2. Acute pain of left knee M25.562 719.46 diclofenac sodium (PENNSAID) 20 mg/gram /actuation(2 %) sop  
   REFERRAL TO PHYSICAL THERAPY 3. Contusion of left knee, subsequent encounter S80. 02XD V58.89 diclofenac sodium (PENNSAID) 20 mg/gram /actuation(2 %) sop  
  924.11 REFERRAL TO PHYSICAL THERAPY 4. Contusion of right knee, subsequent encounter S80. 01XD V58.89 diclofenac sodium (PENNSAID) 20 mg/gram /actuation(2 %) sop  
  924.11 REFERRAL TO PHYSICAL THERAPY 5. Sprain of right knee, unspecified ligament, subsequent encounter S83. 91XD V58.89 diclofenac sodium (PENNSAID) 20 mg/gram /actuation(2 %) sopm  
  844.9 REFERRAL TO PHYSICAL THERAPY 6. Sprain of left knee, unspecified ligament, subsequent encounter S83. 92XD V58.89 diclofenac sodium (PENNSAID) 20 mg/gram /actuation(2 %) sopm  
  844.9 REFERRAL TO PHYSICAL THERAPY PLAN:  She will start a brief course of aquatic outpatient physical therapy - ordered last OV. She will be referred for bariatric surgery consultation. She will follow up as needed. New antibiotic ointment dressing applied to her left knee abrasions. There is no need for surgery at this time. We discussed possible need for cortisone injection at some time in the future if pain continues. Voltaren Gel Rx provided.   
 
Scribed by Fina Flood MD [facility]) as dictated by Fina Flood MD

## 2019-03-20 NOTE — PROGRESS NOTES
1. Have you been to the ER, urgent care clinic since your last visit? Hospitalized since your last visit? No 
 
2. Have you seen or consulted any other health care providers outside of the [address] [place] [address] since your last visit? Include any pap smears or colon screening.  No

## 2019-03-29 ENCOUNTER — HOSPITAL ENCOUNTER (OUTPATIENT)
Dept: PHYSICAL THERAPY | Age: 64
Discharge: HOME | End: 2019-03-29
Payer: COMMERCIAL

## 2019-03-29 PROCEDURE — 97110 THERAPEUTIC EXERCISES: CPT

## 2019-03-29 PROCEDURE — 97535 SELF CARE MNGMENT TRAINING: CPT

## 2019-03-29 PROCEDURE — 97161 PT EVAL LOW COMPLEX 20 MIN: CPT

## 2019-03-29 NOTE — PROGRESS NOTES
PT DAILY TREATMENT NOTE/KNEE EVAL 10-18 Patient Name: Sulma Newman Date:3/29/2019 : 1955 [x]  Patient  Verified Payor: Tomás Zeng / Plan: [facility]SI [facility] PPO / Product Type: Commerical / In time:4:10pm  Out time:4:48pm 
Total Treatment Time (min): 38 Visit #: 1 of 12 Medicare/BCBS Only Total Timed Codes (min):  20 1:1 Treatment Time:  45 Treatment Area: Pain in right knee [M25.561] Pain in left knee [M25.562] SUBJECTIVE Pain Level (0-10 scale): 9/10 [x]constant []intermittent []improving []worsening []no change since onset Any medication changes, allergies to medications, adverse drug reactions, diagnosis change, or new procedure performed?: [x] No    [] Yes (see summary sheet for update) Subjective functional status/changes:    
Pt is a 58year old female presenting with reports of bilat knee pain after tripping and falling off her front porch in 2019. She reports she has had constant anterior knee pain that has worsened since. She has f/u since with orthopaedics with radiographic imaging that was reportedly normal of both knees, but has persistent burning and aching pain that limits her ability to walk and get out of chairs, more so on the left knee. PLOF: ambulatory void of knee pain, working at [employer] Limitations to PLOF: limited ease of ambulation and transfers 2/2 pain Mechanism of Injury: fall Current symptoms/Complaints: see above Previous Treatment/Compliance: orthopaedics PMHx/Surgical Hx: lobectomy right lung, hysterectomy, 2x right knee arthroscopy, bone removal in left foot per pt Work Hx: see intake Living Situation:  
Pt Goals: see intake Barriers: []pain []financial []time []transportation []other Motivation: appears motivated and cooperative Substance use: []Alcohol []Tobacco []other:  
FABQ Score: []low []elevate Cognition: A & O x 4    Other: OBJECTIVE/EXAMINATION Domestic Life: Activity/Recreational Limitations:  
Mobility:  
Self Care:  
 
18 min [x]Eval                  []Re-Eval  
 
10 min Therapeutic Exercise:  [] See flow sheet : HEP handout Rationale: increase ROM and increase strength to improve the patients ability to improve ease of daily activity and ambulation. Self Care: 8 minutes pt education regarding relevant anatomy, diagnosis, prognosis, plan of care, activity modification, self modality use to facilitate pt self management. With 
 [] TE 
 [] TA 
 [] neuro 
 [] other: Patient Education: [x] Review HEP [] Progressed/Changed HEP based on:  
[] positioning   [] body mechanics   [] transfers   [] heat/ice application   
[] other:   
 
Other Objective/Functional Measures:  
Physical Therapy Evaluation - Knee Posture: [] Varus    [] Valgus    [] Recurvatum     
  [] Tibial Torsion    [] Foot Supination    [] Foot Pronation Describe: 
 
Gait:  [] Normal    [x] Abnormal    [x] Antalgic    [] NWB    Device: 
  Describe: decreased WB on LLE 
 
ROM / Strength 
[] Unable to assess                  AROM                      PROM                   Strength (1-5) Left Right Left Right Left Right Hip Flexion     5 5 Extension Abduction Adduction     3+ 3+ Knee Flexion 100 100 123 117 4 4 Extension 0 0   4+ 4+ Ankle Plantarflexion     5 5 Dorsiflexion     5 5 Soft end feel, initially empty, gradually relaxes towards end range Flexibility: [] Unable to assess at this time Hamstrings: (L) Tightness= [x] WNL   [] Min   [] Mod   [] Severe (R) Tightness= [x] WNL   [] Min   [] Mod   [] Severe Quadriceps: (L) Tightness= [] WNL   [x] Min   [] Mod   [] Severe (R) Tightness= [] WNL   [x] Min   [] Mod   [] Severe Gastroc: (L) Tightness= [x] WNL   [] Min   [] Mod   [] Severe (R) Tightness= [x] WNL   [] Min   [] Mod   [] Severe Other: 
 
Palpation: 
 Neg/Pos  Neg/Pos  Neg/Pos Joint Line  Quad tendon  Patellar ligament Patella (+) bilat Fibular head  Pes Anserinus Tibial tubercle  Hamstring tendons  Infrapatellar fat pad Optional Tests: 
Patellar Positioning (Static) []L []R Normal []L []R Lateral 
 []L []R Vishal Moll      []L []R Medial 
 []L []R [facility] Patellar Tracking 
 []L []R Glide (Lat)   []L []R Tilt (Lat) []L []R Glide (Med)  []L []R Tilt (Med) 
    []L []R Tile (Inf) Patellar Mobility: guarded during attempted assessment 
 []L []R Hypermobile []L []R Hypomobile Girth Measurements:  
  Cm at  Cm above joint line   Cm at   Cm below joint line  Cm at joint line Left Right Lachmans  [x] Neg    [] Pos Posterior Drawer [x] Neg    [] Pos Pivot Shift  [] Neg    [] Pos Posterior Sag  [] Neg    [] Pos MONIKA   [] Neg    [] Pos Hamilton's Test [] Neg    [] Pos ALRI   [] Neg    [] Pos Squat   [] Neg    [] Pos 
Valgus@ 0 Degrees [x] Neg    [] Pos Mikel-Nils [x] Neg    [] Pos 
Valgus@ 30 Degrees [x] Neg    [] Pos Patellar Apprehension [x] Neg    [] Pos 
Varus@ 0 Degrees [x] Neg    [] Pos Pagan's Compression [] Neg    [] Pos 
Varus@ 30 Degrees [x] Neg    [] Pos Ely's Test  [x] Neg    [] Pos Apley's Compression [] Neg    [] Pos Jelena's Test  [] Neg    [] Pos Apley's Distraction [] Neg    [] Pos Stroke Test  [] Neg    [] Pos Anterior Drawer [x] Neg    [] Pos Fluctuation Test [] Neg    [] Pos Other:                  [] Neg    [] Pos Other tests/comments: 
30 second to stand: 6 reps Stairs: reciprocal gait pattern Pt conversant and pleasant void of outward signs of severe distress or discomfort despite high pain reports No effusion or significant swelling noted Pain Level (0-10 scale) post treatment: 9/10 ASSESSMENT/Changes in Function: per POC.  
 
Patient will continue to benefit from skilled PT services to modify and progress therapeutic interventions, address functional mobility deficits, address ROM deficits, address strength deficits, analyze and address soft tissue restrictions, analyze and cue movement patterns and instruct in home and community integration to attain remaining goals. [x]  See Plan of Care 
[]  See progress note/recertification 
[]  See Discharge Summary Progress towards goals / Updated goals: 
STG. 
1. Patient will report performance of HEP at least 2 times per day to facilitate improved outcomes and improved self management. LTG. 1. Patient will report FOTO score of 50 or better to indicate significant improvement in functional status. 2. Pt will demonstrate 30 second sit to stand score of 10 reps or better to improve ease of transfers. 3. Pt will demonstrate 125 or better knee flexion AROM bilat to improve ease of functional mobility. 4. Pt will demonstrate normalized gait mechanics to improve community ambulation ability. PLAN [x]  Upgrade activities as tolerated     [x]  Continue plan of care 
[]  Update interventions per flow sheet      
[]  Discharge due to:_ 
[x]  Other:_  [employer] Jennifer Smith, PT, DPT, ATC 3/29/2019  4:14 PM

## 2019-03-29 NOTE — PROGRESS NOTES
[address]  [address] [place], [address] 
[phone] fax Plan of Care/ Statement of Necessity for Physical Therapy Services Patient name: Sulma Newman Start of Care: 3/29/2019 Referral source: Ponce Calderon MD : 1955 Medical Diagnosis: Pain in right knee [M25.561] Pain in left knee [M25.562] Payor: Tomás Zeng / Plan: [employer] PPO / Product Type: Commerical /  Onset Date:2019 Treatment Diagnosis: mechanical knee pain Prior Hospitalization: see medical history Provider#: [id number] Medications: Verified on Patient summary List  
 Comorbidities: lobectomy right lung, hysterectomy, 2x right knee arthroscopy, bone removal in left foot per pt Prior Level of Function: ambulatory void of knee pain, working at [employer] The Plan of Care and following information is based on the information from the initial evaluation. Assessment/ key information: Pt is a 58year old female presenting with reports of bilat knee pain after tripping and falling off her front porch in 2019. She reports she has had constant anterior knee pain that has worsened since. She has f/u since with orthopaedics with radiographic imaging that was reportedly normal of both knees, but has persistent burning and aching pain that limits her ability to walk and get out of chairs, more so on the left knee. She presents with impairments consisting of antalgic gait, limited knee ROM bilat, impairedease of transfers and stair negotiation. She demonstrates (-) indications of ligamentous involvement, (-) edema/effusion. Pt will benefit from skilled PT management to address their impairments and improve their level of function.  
 
 
 
Evaluation Complexity History MEDIUM  Complexity : 1-2 comorbidities / personal factors will impact the outcome/ POC ; Examination MEDIUM Complexity : 3 Standardized tests and measures addressing body structure, function, activity limitation and / or participation in recreation  ;Presentation LOW Complexity : Stable, uncomplicated  ;Clinical Decision Making MEDIUM Complexity : FOTO score of 26-74 Overall Complexity Rating: LOW Problem List: pain affecting function, decrease ROM, decrease strength, impaired gait/ balance, decrease ADL/ functional abilitiies, decrease activity tolerance, decrease flexibility/ joint mobility and decrease transfer abilities Treatment Plan may include any combination of the following: Therapeutic exercise, Therapeutic activities, Neuromuscular re-education, Physical agent/modality, Gait/balance training, Manual therapy, Aquatic therapy, Patient education and Self Care training Patient / Family readiness to learn indicated by: asking questions, trying to perform skills and interest 
Persons(s) to be included in education: patient (P) Barriers to Learning/Limitations: None Patient Goal (s): To get well.  Patient Self Reported Health Status: excellent Rehabilitation Potential: good Short Term Goals: To be accomplished in 2 weeks: 1. Patient will report performance of HEP at least 2 times per day to facilitate improved outcomes and improved self management. Long Term Goals: To be accomplished in 4 weeks: 1. Patient will report FOTO score of 50 or better to indicate significant improvement in functional status. 2. Pt will demonstrate 30 second sit to stand score of 10 reps or better to improve ease of transfers. 3. Pt will demonstrate 125 or better knee flexion AROM bilat to improve ease of functional mobility. 4. Pt will demonstrate normalized gait mechanics to improve community ambulation ability. 
  
Frequency / Duration: Patient to be seen 2 times per week for 4 weeks. Patient/ Caregiver education and instruction: Diagnosis, prognosis, self care, activity modification and exercises [x]  Plan of care has been reviewed with LANDEN Paul, Pt, DPT, ATC 3/29/2019 6:47 PM 
 
________________________________________________________________________ I certify that the above Therapy Services are being furnished while the patient is under my care. I agree with the treatment plan and certify that this therapy is necessary. [de-identified] Signature:____________Date:_________TIME:________ 
 
[employer], Date and Time must be completed for valid certification ** Please sign and return to [address] [place], [address] 
[phone] fax

## 2019-04-09 ENCOUNTER — APPOINTMENT (OUTPATIENT)
Dept: PHYSICAL THERAPY | Age: 64
End: 2019-04-09
Payer: COMMERCIAL

## 2019-04-15 ENCOUNTER — HOSPITAL ENCOUNTER (OUTPATIENT)
Dept: PHYSICAL THERAPY | Age: 64
Discharge: HOME | End: 2019-04-15
Payer: COMMERCIAL

## 2019-04-15 PROCEDURE — 97113 AQUATIC THERAPY/EXERCISES: CPT

## 2019-04-15 NOTE — PROGRESS NOTES
PT DAILY TREATMENT NOTE 10-18 Patient Name: Dann Palmer Date:4/15/2019 : 1955 [x]  Patient  Verified Payor: Jackelyn Sigala / Plan: [employer]SI [facility] PPO / Product Type: Commerical / In time:4:25  Out time:4:55 Total Treatment Time (min): 30 Visit #: 2 of 12 Medicare/BCBS Only Total Timed Codes (min):  30 1:1 Treatment Time:  30 Treatment Area: Pain in right knee [M25.561] Pain in left knee [M25.562] SUBJECTIVE Pain Level (0-10 scale): 6/10 Any medication changes, allergies to medications, adverse drug reactions, diagnosis change, or new procedure performed?: [x] No    [] Yes (see summary sheet for update) Subjective functional status/changes:   [] No changes reported Pt reports compliance with HEP. Pt reports she feels okay today but over the last few days she's had increased pain. OBJECTIVE 30 min Therapeutic Exercise:  [x] See flow sheet :  
Rationale: increase strength, improve coordination and increase proprioception to improve the patients ability to tolerate functional activities. With 
 [] TE 
 [] TA 
 [] neuro 
 [] other: Patient Education: [x] Review HEP [] Progressed/Changed HEP based on:  
[] positioning   [] body mechanics   [] transfers   [] heat/ice application   
[] other:   
 
Other Objective/Functional Measures: initiated aquatic exercises as per flow sheet. Pain Level (0-10 scale) post treatment: 10 ASSESSMENT/Changes in Function: Pt demonstrates fair control and form with aquatic exercises. Patient will continue to benefit from skilled PT services to modify and progress therapeutic interventions, address functional mobility deficits, address ROM deficits, address strength deficits, analyze and address soft tissue restrictions, analyze and cue movement patterns and analyze and modify body mechanics/ergonomics to attain remaining goals. []  See Plan of Care 
[]  See progress note/recertification []  See Discharge Summary Progress towards goals / Updated goals: 
STG. 
1. Patient will report performance of HEP at least 2 times per day to facilitate improved outcomes and improved self management. 
  
LTG. 
  
1. Patient will report FOTO score of 50 or better to indicate significant improvement in functional status. 2. Pt will demonstrate 30 second sit to stand score of 10 reps or better to improve ease of transfers. 3. Pt will demonstrate 125 or better knee flexion AROM bilat to improve ease of functional mobility. 4. Pt will demonstrate normalized gait mechanics to improve community ambulation ability. PLAN 
[]  Upgrade activities as tolerated     [x]  Continue plan of care 
[]  Update interventions per flow sheet      
[]  Discharge due to:_ 
[]  Other:_ Darrell Watkins PTA 4/15/2019  4:27 PM 
 
Future Appointments Date Time Provider Kinsey Ivey 4/15/2019  4:30 PM Cam Pour, PTA [facility]  
4/17/2019  4:30 PM Cam Pour, PTA [facility]  
4/22/2019  4:30 PM Cam Pour, PTA [facility]  
4/24/2019  4:30 PM Cam Pour, PTA [facility]  
4/29/2019  4:30 PM Cam Pour, PTA [facility]  
8/27/2019  2:30 PM Peri Sheikh MD [facility]

## 2019-04-17 ENCOUNTER — HOSPITAL ENCOUNTER (OUTPATIENT)
Dept: PHYSICAL THERAPY | Age: 64
Discharge: HOME | End: 2019-04-17
Payer: COMMERCIAL

## 2019-04-17 PROCEDURE — 97113 AQUATIC THERAPY/EXERCISES: CPT

## 2019-04-17 NOTE — PROGRESS NOTES
PT DAILY TREATMENT NOTE 10-18 Patient Name: Bibi Ingram Date:2019 : 1955 [x]  Patient  Verified Payor: Quentin Winn / Plan: [facility] PPO / Product Type: Commerical / In time:4:28  Out time:5:06 Total Treatment Time (min): 38 Visit #: 3 of 12 Medicare/BCBS Only Total Timed Codes (min):   1:1 Treatment Time:    
 
 
Treatment Area: Pain in right knee [M25.561] Pain in left knee [M25.562] SUBJECTIVE Pain Level (0-10 scale): 6/10 Any medication changes, allergies to medications, adverse drug reactions, diagnosis change, or new procedure performed?: [x] No    [] Yes (see summary sheet for update) Subjective functional status/changes:   [] No changes reported Pt reports no new complaints of pain. Pt reports her knees feel like they are stinging. Pt states she had soreness after last session. OBJECTIVE 38 min Therapeutic Exercise:  [] See flow sheet :  
Rationale: increase ROM and increase strength to improve the patients ability to perform functional activities with increased ease. With 
 [] TE 
 [] TA 
 [] neuro 
 [] other: Patient Education: [x] Review HEP [] Progressed/Changed HEP based on:  
[] positioning   [] body mechanics   [] transfers   [] heat/ice application   
[] other:   
 
Other Objective/Functional Measures: Added hip circles and LAQ today. Pain Level (0-10 scale) post treatment: 5/10 ASSESSMENT/Changes in Function: Pt demonstrates good carry over with vc's for form. Pt has decreased symptoms post treatment. Patient will continue to benefit from skilled PT services to modify and progress therapeutic interventions, address functional mobility deficits, address ROM deficits, address strength deficits, analyze and address soft tissue restrictions, analyze and cue movement patterns and analyze and modify body mechanics/ergonomics to attain remaining goals. []  See Plan of Care []  See progress note/recertification 
[]  See Discharge Summary Progress towards goals / Updated goals: 
STG. 
1. Patient will report performance of HEP at least 2 times per day to facilitate improved outcomes and improved self management. 
  
LTG. 
  
1. Patient will report FOTO score of 50 or better to indicate significant improvement in functional status. 2. Pt will demonstrate 30 second sit to stand score of 10 reps or better to improve ease of transfers. 3. Pt will demonstrate 125 or better knee flexion AROM bilat to improve ease of functional mobility. 4. Pt will demonstrate normalized gait mechanics to improve community ambulation ability. PLAN 
[]  Upgrade activities as tolerated     [x]  Continue plan of care 
[]  Update interventions per flow sheet      
[]  Discharge due to:_ 
[]  Other:_ Darrell Watkins PTA 4/17/2019  4:32 PM 
 
Future Appointments Date Time Provider Kinsey Ivey 4/22/2019  4:30 PM Cam Pour, PTA [facility]  
4/24/2019  4:30 PM Cam Pour, PTA [facility]  
4/29/2019  4:30 PM Cam Pour, PTA [facility]  
8/27/2019  2:30 PM Peri Sheikh MD [facility]

## 2019-04-22 ENCOUNTER — HOSPITAL ENCOUNTER (OUTPATIENT)
Dept: PHYSICAL THERAPY | Age: 64
Discharge: HOME | End: 2019-04-22
Payer: COMMERCIAL

## 2019-04-22 PROCEDURE — 97113 AQUATIC THERAPY/EXERCISES: CPT

## 2019-04-22 NOTE — PROGRESS NOTES
PT DAILY TREATMENT NOTE 10-18 Patient Name: Sulma Newman Date:2019 : 1955 [x]  Patient  Verified Payor: Tomás Zeng / Plan: [facility]SI [facility] PPO / Product Type: Commerical / In time:4:27  Out time:5:05 Total Treatment Time (min): 38 Visit #: 4 of 12 Medicare/BCBS Only Total Timed Codes (min):   1:1 Treatment Time:    
 
 
Treatment Area: Pain in right knee [M25.561] Pain in left knee [M25.562] SUBJECTIVE Pain Level (0-10 scale): 4/10 Any medication changes, allergies to medications, adverse drug reactions, diagnosis change, or new procedure performed?: [x] No    [] Yes (see summary sheet for update) Subjective functional status/changes:   [] No changes reported Pt reports her knees feel better but her left one feels warm today. OBJECTIVE 38 min Therapeutic Exercise:  [x] See flow sheet :  
Rationale: increase ROM and increase strength to improve the patients ability to tolerate ADLs. With 
 [] TE 
 [] TA 
 [] neuro 
 [] other: Patient Education: [x] Review HEP [] Progressed/Changed HEP based on:  
[] positioning   [] body mechanics   [] transfers   [] heat/ice application   
[] other:   
 
Other Objective/Functional Measures: Increased reps as per flow sheet. Pain Level (0-10 scale) post treatment: 0/10 ASSESSMENT/Changes in Function: Pt had no adverse reaction to treatment. Pt demonstrates good form with all exercises. Patient will continue to benefit from skilled PT services to modify and progress therapeutic interventions, address functional mobility deficits, address ROM deficits, address strength deficits, analyze and address soft tissue restrictions, analyze and cue movement patterns and analyze and modify body mechanics/ergonomics to attain remaining goals. []  See Plan of Care 
[]  See progress note/recertification 
[]  See Discharge Summary Progress towards goals / Updated goals: 
STG. 1. Patient will report performance of HEP at least 2 times per day to facilitate improved outcomes and improved self management. - met per patient report. 4/22/19 
  
LTG. 
  
1. Patient will report FOTO score of 50 or better to indicate significant improvement in functional status. 2. Pt will demonstrate 30 second sit to stand score of 10 reps or better to improve ease of transfers. 3. Pt will demonstrate 125 or better knee flexion AROM bilat to improve ease of functional mobility. 4. Pt will demonstrate normalized gait mechanics to improve community ambulation ability. - progressing, decreased gait deviations noted when ambulating into pool room. 4/22/19 PLAN 
[]  Upgrade activities as tolerated     [x]  Continue plan of care 
[]  Update interventions per flow sheet      
[]  Discharge due to:_ 
[]  Other:_ Howard Barger PTA 4/22/2019  4:55 PM 
 
Future Appointments Date Time Provider Kinsey Ivey 4/24/2019  4:30 PM [facility]  
4/29/2019  4:30 PM Fidel May PTA [facility]  
8/27/2019  2:30 PM Libertad Sandhu MD [facility]

## 2019-04-24 ENCOUNTER — HOSPITAL ENCOUNTER (OUTPATIENT)
Dept: PHYSICAL THERAPY | Age: 64
Discharge: HOME | End: 2019-04-24
Payer: COMMERCIAL

## 2019-04-24 PROCEDURE — 97113 AQUATIC THERAPY/EXERCISES: CPT

## 2019-04-24 NOTE — PROGRESS NOTES
PT DAILY TREATMENT NOTE 10-18 Patient Name: Sarah Azevedo Date:2019 : 1955 [x]  Patient  Verified Payor: Corby Araya / Plan: [facility]SI [facility] PPO / Product Type: Commerical / In time:4:25  Out time:5:05 Total Treatment Time (min): 40 Visit #: 5 of 12 Medicare/BCBS Only Total Timed Codes (min):   1:1 Treatment Time:    
 
 
Treatment Area: Pain in right knee [M25.561] Pain in left knee [M25.562] SUBJECTIVE Pain Level (0-10 scale): 4/10 Any medication changes, allergies to medications, adverse drug reactions, diagnosis change, or new procedure performed?: [x] No    [] Yes (see summary sheet for update) Subjective functional status/changes:   [] No changes reported Pt reports no new complaints of pain. Pt reports she had increased activity today and feels fine. OBJECTIVE 40 min Therapeutic Exercise:  [x] See flow sheet :  
Rationale: increase ROM and increase strength to improve the patients ability to tolerate ADLs. With 
 [] TE 
 [] TA 
 [] neuro 
 [] other: Patient Education: [x] Review HEP [] Progressed/Changed HEP based on:  
[] positioning   [] body mechanics   [] transfers   [] heat/ice application   
[] other:   
 
Other Objective/Functional Measures: increased reps with exercises sa per flow sheet. Pain Level (0-10 scale) post treatment: 0/10 ASSESSMENT/Changes in Function: Pt demonstrates good tolerance to therapeutic exercises. Pt has good carry over with vc's. Consider DC next visit to post rehab program if patient continues to progress toward functional goals. Patient will continue to benefit from skilled PT services to modify and progress therapeutic interventions, address functional mobility deficits, address ROM deficits, address strength deficits, analyze and address soft tissue restrictions, analyze and cue movement patterns and analyze and modify body mechanics/ergonomics to attain remaining goals. []  See Plan of Care 
[]  See progress note/recertification 
[]  See Discharge Summary Progress towards goals / Updated goals: 
STG. 
1. Patient will report performance of HEP at least 2 times per day to facilitate improved outcomes and improved self management. - met per patient report. 4/22/19 
  
LTG. 
  
1. Patient will report FOTO score of 50 or better to indicate significant improvement in functional status. 2. Pt will demonstrate 30 second sit to stand score of 10 reps or better to improve ease of transfers. 3. Pt will demonstrate 125 or better knee flexion AROM bilat to improve ease of functional mobility. 4. Pt will demonstrate normalized gait mechanics to improve community ambulation ability. - progressing, decreased gait deviations noted when ambulating into pool room. 4/22/19 PLAN 
[]  Upgrade activities as tolerated     [x]  Continue plan of care 
[]  Update interventions per flow sheet      
[]  Discharge due to:_ 
[]  Other:_ Shandra Gibson PTA 4/24/2019  4:34 PM 
 
Future Appointments Date Time Provider Kinsey Ivey 4/29/2019  4:30 PM Piotr Ramos PTA [facility]  
8/27/2019  2:30 PM Timothy Chandler MD [facility]

## 2019-04-29 ENCOUNTER — HOSPITAL ENCOUNTER (OUTPATIENT)
Dept: PHYSICAL THERAPY | Age: 64
Discharge: HOME | End: 2019-04-29
Payer: COMMERCIAL

## 2019-04-29 PROCEDURE — 97113 AQUATIC THERAPY/EXERCISES: CPT

## 2019-04-29 NOTE — PROGRESS NOTES
PT DAILY TREATMENT NOTE 10-18 Patient Name: Latosha Srinivasan Date:2019 : 1955 [x]  Patient  Verified Payor: Gilberto Hermosillo / Plan: [employer] [facility] PPO / Product Type: Commerical / In time:4:23  Out time:5:02 Total Treatment Time (min): 39 Visit #: 6 of 12 Medicare/BCBS Only Total Timed Codes (min):  39 1:1 Treatment Time:  44 Treatment Area: Pain in right knee [M25.561] Pain in left knee [M25.562] SUBJECTIVE Pain Level (0-10 scale): 5/10 Any medication changes, allergies to medications, adverse drug reactions, diagnosis change, or new procedure performed?: [x] No    [] Yes (see summary sheet for update) Subjective functional status/changes:   [] No changes reported Pt reports no new complaints of pain. Pt reports getting in and out of a truck many times today and has some discomfort in her knees. OBJECTIVE 39 min Therapeutic Exercise:  [] See flow sheet :  
Rationale: increase ROM and increase strength to improve the patients ability to tolerate ADLs. With 
 [] TE 
 [] TA 
 [] neuro 
 [] other: Patient Education: [x] Review HEP [] Progressed/Changed HEP based on:  
[] positioning   [] body mechanics   [] transfers   [] heat/ice application   
[] other:   
 
Other Objective/Functional Measures: FOTO: 48 
Knee flexion AROM Right: 110 degrees  Left: 105 degrees 10 Sit to  30 seconds Pain Level (0-10 scale) post treatment: 0/10 ASSESSMENT/Changes in Function: Pt demonstrates improved functional mobility with ability to perform 10 sit to stands in 30 seconds but continues to have limited knee mobility bilaterally. Pt will continue PT alternating pool/land for 2 weeks then progress to land 2x/week for 1 week to prepare for DC to updated HEP. Discussed aquatic post rehab program with patient.   
 
Patient will continue to benefit from skilled PT services to modify and progress therapeutic interventions, address functional mobility deficits, address ROM deficits, address strength deficits, analyze and address soft tissue restrictions, analyze and cue movement patterns and analyze and modify body mechanics/ergonomics to attain remaining goals. []  See Plan of Care 
[]  See progress note/recertification 
[]  See Discharge Summary Progress towards goals / Updated goals: 
STG. 
1. Patient will report performance of HEP at least 2 times per day to facilitate improved outcomes and improved self management. - met per patient report. 4/22/19 
  
LTG. 
  
1. Patient will report FOTO score of 50 or better to indicate significant improvement in functional status. -progressed to 50; 4/29/19 2. Pt will demonstrate 30 second sit to stand score of 10 reps or better to improve ease of transfers.- met 4/29/19 3. Pt will demonstrate 125 or better knee flexion AROM bilat to improve ease of functional mobility. - Not met; progressed to left 105 degrees; right 110 degrees. 4/29/19 4. Pt will demonstrate normalized gait mechanics to improve community ambulation ability.- progressing, decreased gait deviations noted when ambulating into pool room. 4/22/19 
  
PLAN 
[]  Upgrade activities as tolerated     [x]  Continue plan of care 
[]  Update interventions per flow sheet      
[]  Discharge due to:_ 
[]  Other:_ Darrell Watkins PTA 4/29/2019  4:24 PM 
 
Future Appointments Date Time Provider Kinsey Ivey 4/29/2019  4:30 PM Bandar Cohen PTA [id number]  
8/27/2019  2:30 PM Peri Sheikh MD [facility]

## 2019-04-30 ENCOUNTER — OFFICE VISIT (OUTPATIENT)
Dept: INTERNAL MEDICINE CLINIC | Age: 64
End: 2019-04-30

## 2019-04-30 VITALS
BODY MASS INDEX: 38.8 KG/M2 | SYSTOLIC BLOOD PRESSURE: 126 MMHG | HEIGHT: 63 IN | DIASTOLIC BLOOD PRESSURE: 65 MMHG | OXYGEN SATURATION: 98 % | HEART RATE: 86 BPM | TEMPERATURE: 98.5 F | WEIGHT: 219 LBS | RESPIRATION RATE: 14 BRPM

## 2019-04-30 DIAGNOSIS — J37.0 CHRONIC LARYNGITIS: Primary | ICD-10-CM

## 2019-04-30 NOTE — PROGRESS NOTES
Garret Goldmann presents today for   Chief Complaint   Patient presents with    Laryngitis     3 weeks              Depression Screening:  3 most recent PHQ Screens 3/20/2019   Little interest or pleasure in doing things Not at all   Feeling down, depressed, irritable, or hopeless Not at all   Total Score PHQ 2 0       Learning Assessment:  Learning Assessment 2/19/2019   PRIMARY LEARNER Patient   HIGHEST LEVEL OF EDUCATION - PRIMARY LEARNER  GRADUATED HIGH SCHOOL OR GED   BARRIERS PRIMARY LEARNER NONE   CO-LEARNER CAREGIVER No   PRIMARY LANGUAGE ENGLISH   LEARNER PREFERENCE PRIMARY DEMONSTRATION   ANSWERED BY patient   RELATIONSHIP SELF       Abuse Screening:  Abuse Screening Questionnaire 2/19/2019   Do you ever feel afraid of your partner? N   Are you in a relationship with someone who physically or mentally threatens you? N   Is it safe for you to go home? Y       Fall Risk  Fall Risk Assessment, last 12 mths 1/18/2019   Able to walk? Yes   Fall in past 12 months? Yes   Fall with injury? Yes   Number of falls in past 12 months 1   Fall Risk Score 2           Coordination of Care:  1. Have you been to the ER, urgent care clinic since your last visit? NO   Hospitalized since your last visit? NO    2. Have you seen or consulted any other health care providers outside of the [address] [place] [address] since your last visit? Include any pap smears or colon screening.  NO

## 2019-04-30 NOTE — PROGRESS NOTES
Sulma Newman is a 58 y.o.  female and presents with    Chief Complaint   Patient presents with    Laryngitis     3 weeks  Room 11       Subjective:  HPI  She presents with laryngitis x 3 weeks. She denies any other constitutional symptoms. Warm salt and water gargles, listerine, lemon without relief. Denies sick contacts. She reports has been a while since been seen by pulmonology. She denies hx of acid reflux. She is without environmental allergies     Additional Concerns: none     ROS   Review of Systems   Constitutional: Negative. HENT: Positive for hearing loss. Chronic hearing loss     Eyes: Negative. Respiratory: Positive for cough and shortness of breath. Negative for hemoptysis, sputum production and wheezing. Cardiovascular: Positive for chest pain. Tightness to chest that is chronic- she was seen by cardiology and holter monitor   Gastrointestinal: Negative. Genitourinary: Negative. Musculoskeletal: Negative. Skin: Negative. Neurological: Negative. Negative for dizziness and headaches. Endo/Heme/Allergies: Positive for environmental allergies. Psychiatric/Behavioral: Negative. Allergies   Allergen Reactions    Naproxen Unable to Obtain and Other (comments)    Penicillins Rash, Hives and Swelling    Advair Diskus [Fluticasone Propion-Salmeterol] Other (comments)     Hoarseness    Nsaids (Non-Steroidal Anti-Inflammatory Drug) Other (comments)     H/o PUD       Current Outpatient Medications   Medication Sig Dispense Refill    ergocalciferol (VITAMIN D2) 50,000 unit capsule Take 1 Cap by mouth every seven (7) days. 13 Cap 3    magnesium oxide (MAG-OX) 400 mg tablet Take 400 mg by mouth daily.  ascorbic acid (VITAMIN C) 250 mg tablet Take 250 mg by mouth daily.  multivitamin (ONE A DAY) tablet Take 1 Tab by mouth daily.       diclofenac sodium (PENNSAID) 20 mg/gram /actuation(2 %) sopm 2 Pump(s) by Apply Externally route two (2) times daily as needed for Pain. 1 Bottle 1    diclofenac (VOLTAREN) 1 % gel Apply  to affected area four (4) times daily. USE AS DIRECTED 100 g 1    traMADol (ULTRAM) 50 mg tablet Take 1 Tab by mouth every six (6) hours as needed for Pain. Max Daily Amount: 200 mg. 30 Tab 0    potassium chloride (K-DUR, KLOR-CON) 10 mEq tablet Take 1 Tab by mouth daily. 30 Tab 5    lidocaine-EPINEPHrine (XYLOCAINE) 1 %-1:100,000 injection 10 mL by IntraDERMal route once for 1 dose. Indications: ADMINISTRATION OF LOCAL ANESTHESIA 2 Vial 0    beclomethasone (QVAR) 80 mcg/actuation inhaler Take 1 Puff by inhalation two (2) times a day. 8.7 g 3    albuterol (PROVENTIL HFA, VENTOLIN HFA) 90 mcg/actuation inhaler Take 2 Puffs by inhalation every four (4) hours as needed.            Social History     Socioeconomic History    Marital status: SINGLE     Spouse name: Not on file    Number of children: 0    Years of education: Not on file    Highest education level: Not on file   Occupational History    Occupation: [employer]   Social Needs    Financial resource strain: Not on file    Food insecurity:     Worry: Not on file     Inability: Not on file    Transportation needs:     Medical: Not on file     Non-medical: Not on file   Tobacco Use    Smoking status: Former Smoker     Packs/day: 0.50     Years: 20.00     Pack years: 10.00     Types: Cigarettes     Last attempt to quit: 1990     Years since quittin.3    Smokeless tobacco: Never Used   Substance and Sexual Activity    Alcohol use: No    Drug use: No    Sexual activity: Not Currently   Lifestyle    Physical activity:     Days per week: Not on file     Minutes per session: Not on file    Stress: Not on file   Relationships    Social connections:     Talks on phone: Not on file     Gets together: Not on file     Attends [employer] service: Not on file     Active member of club or organization: Not on file     Attends meetings of clubs or organizations: Not on file     Relationship status: Not on file    Intimate partner violence:     Fear of current or ex partner: Not on file     Emotionally abused: Not on file     Physically abused: Not on file     Forced sexual activity: Not on file   Other Topics Concern    Not on file   Social History Narrative    Not on file       Past Medical History:   Diagnosis Date    Asthma     Dr. Karley Sinha [facility])     Lung Cancer s/p right lobectomy Dr Radha Cespedes 10/06    Chronic obstructive pulmonary disease ([facility] [address]     and right lung nodules, stable on serial CT last 6/14    Colon adenoma     2009 Dr. Maverick De Dios; adenoma and diverticulosis 9/12 Dr. Javier Watts; 12/26/17    FHx: breast cancer     Glaucoma     Dr Mike Barrow    Hearing loss     Hyperlipidemia 5/14/2018    Hypovitaminosis D 2011    IFG (impaired fasting glucose)     Morbid obesity ([facility] [address]     IF 2/19 start weight 219 lbs    PUD (peptic ulcer disease)     h pylori tx'ed    Pulmonary nodule 06/2014    4 nodules <4mm; stable 5/18    Sleep apnea     on CPAP, Dr. Fely Gamboa 2010    Venous insufficiency        Past Surgical History:   Procedure Laterality Date    CARDIAC SURG PROCEDURE UNLIST  11/07    thallium negative    CARDIAC SURG PROCEDURE UNLIST  12/2017    echo showed nl lv, ef 65%, gr 1 dd; Dr Lyly Ball  09/2016    ex stress echo without clear wma Dr Gerson Rees  11/07    negative    EGD      PUD on EGD, H pylori +txed 2009.  Chau Josue 2009 adenoma; Dr Javier Watts 9/12  adenoma and tics; 12/26/17 sessile serrated and tubular adenomas and tics    HX HEENT      thyroid US negative 6/14    HX HYSTERECTOMY  1993    for fibroids    HX LOBECTOMY      for cancer, Dr. Radha Cespedes 10/06. No adjuvant tx. She has another spot on the same side that is being followed every three months.       HX ORTHOPAEDIC      Left foot ([place]) and knee surgery x2 Dr. Simona Sanchez. Right foot fx 4/18 Dr Harper Lower EXT VEIN BILAT W MANEUVERS  6/11    negative       Family History   Problem Relation Age of Onset    Breast Cancer Mother     Other Brother         SLE    Other Brother         SLE    Other Brother         SLE    Hypertension Father        Objective:  Vitals:    04/30/19 1359   BP: 126/65   Pulse: 86   Resp: 14   Temp: 98.5 °F (36.9 °C)   TempSrc: Oral   SpO2: 98%   Weight: 219 lb (99.3 kg)   Height: 5' 3\" (1.6 m)   PainSc:   0 - No pain       LABS   Results for orders placed or performed in visit on 24/93/46   METABOLIC PANEL, BASIC   Result Value Ref Range    Glucose 109 (H) 65 - 99 mg/dL    BUN 16 8 - 27 mg/dL    Creatinine 0.73 0.57 - 1.00 mg/dL    GFR est non-AA 87 >59 mL/min/1.73    GFR est  >59 mL/min/1.73    BUN/Creatinine ratio 22 12 - 28    Sodium 144 134 - 144 mmol/L    Potassium 4.8 3.5 - 5.2 mmol/L    Chloride 105 96 - 106 mmol/L    CO2 24 20 - 29 mmol/L    Calcium 9.3 8.7 - 10.3 mg/dL   LIPID PANEL   Result Value Ref Range    Cholesterol, total 199 100 - 199 mg/dL    Triglyceride 66 0 - 149 mg/dL    HDL Cholesterol 84 >39 mg/dL    VLDL, calculated 13 5 - 40 mg/dL    LDL, calculated 102 (H) 0 - 99 mg/dL   VITAMIN D, 25 HYDROXY   Result Value Ref Range    VITAMIN D, 25-HYDROXY 17.4 (L) 30.0 - 100.0 ng/mL   CVD REPORT   Result Value Ref Range    INTERPRETATION Note        TESTS  none    PE  Physical Exam   Constitutional: She is oriented to person, place, and time. She appears well-developed and well-nourished. No distress. HENT:   Head: Normocephalic and atraumatic. Eyes: Pupils are equal, round, and reactive to light. Conjunctivae and EOM are normal.   Neck: Normal range of motion. Cardiovascular: Normal rate, regular rhythm, normal heart sounds and intact distal pulses. Pulmonary/Chest: Effort normal and breath sounds normal. No respiratory distress. Abdominal: Soft.  Bowel sounds are normal.   Musculoskeletal: Normal range of motion. Lymphadenopathy:     She has no cervical adenopathy. Neurological: She is alert and oriented to person, place, and time. Skin: Skin is warm and dry. She is not diaphoretic. Psychiatric: She has a normal mood and affect. Her behavior is normal. Judgment and thought content normal.   Vitals reviewed. Assessment/Plan:    1. Laryngitis x 3 weeks- unspecified refer to ENT. Vocal rest, gargles. She will talk with the [facility] regarding vocal rest and time off. Restart Qvar- she has been off x 3 weeks now to make sure no correlation. Lab review: no lab studies available for review at time of visit    Today's Visit:   Diagnoses and all orders for this visit:    1. Chronic laryngitis      Health Maintenance: Deferred to PCP. I have discussed the diagnosis with the patient and the intended plan as seen in the above orders. The patient has received an after-visit summary and questions were answered concerning future plans. I have discussed medication side effects and warnings with the patient as well. I have reviewed the plan of care with the patient, accepted their input and they are in agreement with the treatment goals. More than 1/2 of this 15 minute visit was spent in counseling and coordination of care, as described above.     JESSICA Bryant  Internist of [address], [address] [place] [address].  Phone: [phone]  Fax: [phone]

## 2019-05-01 ENCOUNTER — TELEPHONE (OUTPATIENT)
Dept: INTERNAL MEDICINE CLINIC | Age: 64
End: 2019-05-01

## 2019-05-01 NOTE — LETTER
NOTIFICATION RETURN TO WORK / SCHOOL 
 
5/1/2019 4:19 PM 
 
Ms. Jose Angel [address] 
[place] [place] [address] To Whom It May Concern: 
 
Jose Angel Barber is currently under the care of Freeman Valenzuela. She will return to work/school on: 5/6/19 If there are questions or concerns please have the patient contact our office. Sincerely, Maria Teresa Lloyd MD

## 2019-05-01 NOTE — TELEPHONE ENCOUNTER
1. Pt needs a note to be out of work the rest of the week. Wants it to say she can return on Monday 5/6/2019    2. Pt says she hasn't heard anything about throat doctor appt.

## 2019-05-02 NOTE — PROGRESS NOTES
In [address]  Jenna Chiche[address], [address] 
[phone] fax Physical Therapy Progress Note Patient name: Jill Sotomayor Start of Care: 3/29/2019 Referral source: Kajal Tavares MD : 1955 Medical/Treatment Diagnosis: Pain in right knee [M25.561] Pain in left knee [M25.562] Payor: Kay Doyle / Plan: [employer] [address] PPO / Product Type: Commerical /  Onset Date:2019 Prior Hospitalization: see medical history Provider#: [id number] Medications: Verified on Patient Summary List   
Comorbidities: lobectomy right lung, hysterectomy, 2x right knee arthroscopy, bone removal in left foot per pt 
  
 Prior Level of Function: ambulatory void of knee pain, working at [employer] Visits from Start of Care: 6    Missed Visits: 1 Established Goals:        Excellent         Good         Limited            None 
[] Increased ROM   []  []  [x]  [] 
[] Increased Strength  []  []  [x]  [] 
[] Increased Mobility  []  []  []  []  
[] Decreased Pain   []  []  [x]  [] 
[] Decreased Swelling  []  []  []  [] 
 
Key Functional Changes: FOTO: 48 
Knee flexion AROM Right: 110 degrees  Left: 105 degrees 10 Sit to  30 seconds Updated Goals: to be achieved in 3 weeks: 1. Patient will report FOTO score of 50 or better to indicate significant improvement in functional status. -progressed to 50; 19 2. Pt will demonstrate 30 second sit to stand score of 10 reps or better to improve ease of transfers.- met 19 3. Pt will demonstrate 125 or better knee flexion AROM bilat to improve ease of functional mobility. - Not met; progressed to left 105 degrees; right 110 degrees. 19 4. Pt will demonstrate normalized gait mechanics to improve community ambulation ability.- progressing, decreased gait deviations noted when ambulating into pool room.  19 
  
 ASSESSMENT/RECOMMENDATIONS: Pt demonstrates improved functional mobility with ability to perform 10 sit to stands in 30 seconds but continues to have limited knee mobility bilaterally. Pt will continue PT alternating pool/land for 2 weeks then progress to land 2x/week for 1 week to prepare for DC to updated HEP. Discussed aquatic post rehab program with patient. [x]Continue therapy per initial plan/protocol at a frequency of  2 x per week for 3 weeks []Continue therapy with the following recommended changes:_____________________      _____________________________________________________________________ []Discontinue therapy progressing towards or have reached established goals []Discontinue therapy due to lack of appreciable progress towards goals []Discontinue therapy due to lack of attendance or compliance []Await Physician's recommendations/decisions regarding therapy []Other:________________________________________________________________ Thank you for this referral.   
Raciel Wallace, PT, DPT, ATC 5/2/2019 9:17 AM 
NOTE TO PHYSICIAN:  PLEASE COMPLETE THE ORDERS BELOW AND  
FAX TO [facility]: [phone] If you are unable to process this request in 24 hours please contact our office: [phone] ? I have read the above report and request that my patient continue as recommended. ? I have read the above report and request that my patient continue therapy with the following changes/special instructions:__________________________________________________________ ? I have read the above report and request that my patient be discharged from therapy. Physicians signature: ______________________________Date: ______Time:______

## 2019-05-14 ENCOUNTER — APPOINTMENT (OUTPATIENT)
Dept: PHYSICAL THERAPY | Age: 64
End: 2019-05-14

## 2019-05-16 ENCOUNTER — APPOINTMENT (OUTPATIENT)
Dept: PHYSICAL THERAPY | Age: 64
End: 2019-05-16

## 2019-05-24 ENCOUNTER — HOSPITAL ENCOUNTER (OUTPATIENT)
Dept: PHYSICAL THERAPY | Age: 64
Discharge: HOME | End: 2019-05-24
Payer: COMMERCIAL

## 2019-05-24 PROCEDURE — 92507 TX SP LANG VOICE COMM INDIV: CPT

## 2019-05-24 PROCEDURE — 92524 BEHAVRAL QUALIT ANALYS VOICE: CPT

## 2019-05-24 NOTE — PROGRESS NOTES
[facility] EVALUATION    Patient Name: Rodolph Babinski  [id number]  : 1955  [x]  Patient  Verified  Payor: Danielle Rounds / Plan: Carry Kostas Saba 77 PPO / Product Type: Commerical /   In time: 2:05 Out time: 3:03  Total Treatment Time (min): 62  Visit #: 1 of 24    SUBJECTIVE  Pain Level (0-10 scale): throat 2  Any medication changes, allergies to medications, adverse drug reactions, diagnosis change, or new procedure performed?: [] No    [x] Yes (see summary sheet for update) Pt started on Prilosec. Subjective functional status/changes:   [] No changes reported  Pt referred to skilled ST d/t aphonia x4 weeks per pt report. Pt stated, \"I've had my voice back for about 2 weeks\". Pt noted immediately to be producing excessive throat clearing. 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 \"Some time I used radios/walkie talkies\". Pt reports unproductive cough and throat clearing. Date of Onset: started end of 2019  Social History:  Pt is single; employeed 40 hrs a week; pt enjoys watching sport, eating, and traveling. Prior Functional level: Voice/Speech/Language/swallowing WNLS. Bilateral SNHL. 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 Know Abuse/Misuse: excessive throat clearing and coughing    Pitch:   [] WNL  [x] Low  [] High     Comments:  Loudness: [] WNL  [x] Excessive [] Inadequate     Comments: interrmittently, however, it should be noted pt also has hearing loss.   Quality:  [] WNL      Comments: hoarseness, roughness, breathiness, intermittent strain  Resonance: [x] WNL  [] Hypernasal [] Hyponasal Comments:  Rate:  [x] WNL  [] Fast  [] Slow     Comments:  Range:  [x] WNL  [] Montone [] Excessive variability    Comments:  Observations [x] Pitch Breaks [] Glottal Waite [] Stridency [x] Hard Glottal Attacks    [] Aphonia [] Diplophonia [x] Phonation Breaks     [] Severe Fluctuations on Quality    []Other:     [] Aphonia interspersed with intermittent phonation      After Max modeling, cues: Vowel prolongation /a/ (a measure of respiratory/phonatory efficiency):  Duration: ~6.1  seconds;  pitch: 207 Hz, Intensity:  ~86 dB   Vocal quality:  Hoarse, hard glottal attacks, Strained, increased pitch and intensity. Excessive tension observed in the neck,  and mandible during the prolongation. Frequency (as measured by chromatic tuner and keyboard):  Pitch Range: 17 Semi-tones         Lowest: 155 Hz   Highest: 415 Hz   Modal pitch in conversation: 164 Hz  Modal pitch in readin Hz    Is breathing audible? [] Yes [x] No  Is phonation on inhalation? [] Yes [x] No  Is breathing pattern irregular? [] Yes [x] No  Does patient have difficulty sustaining expiration? [x] Yes [] No  Does patient focus on breathing? [] Yes [x] No  Clavicular/ thoracic breathing? [x] Yes [] No  Reduced respiratory/speech coordination? [x] Yes [] No    Frequent coughing? [x] Yes [] No   Frequent throat clearing? [x] Yes [] No   Extrinsic laryngeal tension? [] Yes [x] No  Visible tension present: []neck  [] chest  [x] mandible  Evidence of tight throat during phonation? [x] Yes [] No ( in vocal tasks)  Complaints of tired throat?  [] Yes [] No  Tone focus: []retracted tongue  [] closed mouth   []appropriate [x] back throat focus    Perceptual assessment:  Consensus Auditory Perceptual Evaluation of Voice (CAPE-V) was administered by this SLP :On a scale of 0 to 100 with 100 equaling the most extreme deviance she attained the following scores: (see attached in paper chart):  Overall Severity: 39/100 consistent; moderately deviant, roughness: 35/100 consistent moderately deviant , breathiness 31 /100 consistent moderately deviant; strain: 46/100 intermittent, moderate-severly deviant; pitch (too low) 25/100 consistent;  moderately deviant; loudness: (too soft)  31/100 inconsistent; mild-moderately deviant      Patient completed Voice Handicap Index (VHI): (see attached in her paper chart): She score agreement or disagreement for statements that many people have used to describe their voices and the effects of their voices on their lives. It is self scored with score choices 0 through 4. Zero indicates 'never handicapping' and 4 indicating 'always handicapping'. The higher the score the more handicapping. There are 40 points possible for each of the 3 sections and also a total handicapping score of 120 points. For functional she attained a score of 13 points (moderatelly handicapping); for physical she scored 13 points (WNLS); and for emotional she scored 8 points (severely handicapping). She attained a total score of 34 points (mildly handicapping). She rated herself a score of  7  on a 7 point scale (extremely talkative). She completed LPR Symptoms Index: See attached in chart. (0= no problem 5=severe problem) She rated the following as 4: hoarseness or problem with her voice. She rated the following as 3: clearing her throat;  troublesome or annoying cough. She rated the following as 1: breathing difficulty or choking episodes.    She rated the following as 0: excess mucus or postnasal drip, coughing after eating or after lying down; sensation of something sticking in her throat or lump in her throat; heartburn, chest pain, indigestion, or stomach acid coming up; difficulty swallowing;       Speech:   Oral Verbal Apraxia: [x] None     [] Mild [] Moderate [] Severe   Dysarthria:  [x] None     [] Mild [] Moderate [] Severe   Intelligibility  [x] WNL      [] Words [] Phrases [] Sentences       [x] Conversation  % intelligible: 100   Agrammatic:  [] Yes       [x] No  Hearing screened by:  Per ENT report bilateral SNHL     Passed [] Yes [x] No  Comments:  See above  Fluency:  [x]WNL  []Dysfluent   Comments:  Deviations noted:  [x]none  []yes, describe: Auditory Comprehension: [x] WFL [] Impaired - describe: (subjective)  Verbal Expression: [x] WFL [] Impaired - describe: (subjective)  Reading comprehension: [x] WFL [] Impaired - describe: (subjective)  Cognitive skills: [x] WFL [] Impaired - describe: (subjective)      Patient/Caregiver instruction/education: [x] Review HEP    [] Progressed/Changed    HEP/Handouts given: How to inhibit vocal misues; Pt education re: LPR/ Reflux Triggers;   Pain Level (0-10 scale) post treatment:  3 throat  ASSESSMENT  [x]  See Plan of Care    Short Term Goals: To be accomplished in 4 weeks  1. Patient will demonstrate good vocal hygiene by  (a) decreasing coughing by ~50% (b) decreasing throat clearing by 50% (c) consume ~ 64 oz of water daily and (d) taking her PPI correctly for best absorption (e) decreasing caffeine by >50% in order to decrease trauma/irritation to the vocal folds to help improve her voice. 2. Patient will use diaphragmatic/abdominal breath management with good connection of airflow to phonation for automatics, words, phrases and simple sentences with 80% acc with min-mod cues during structured phonation/speaking tasks to improve breath support/coordination warranted for improved voicing. 4. Patient will demonstrate a more forward placement of tone, easy onset of phonation and a legato speaking style with adequate loudness, decreased tension and improved pitch (higher) for automatics, words, phrases and short sentences with 70% acc with min-mod cues in order to improve her voice.       5. Patient will perform vocal entrainment exercises with min-mod A in order to improve the balance between respiration, phonation and resonance and improve vocal strength and flexibility for improved vocal health and voice production. Long Term Goals: To be accomplished in 12 weeks   1. Patient will produce improved voice to mild dysphonia to WNL's including decreased tension, increased facial focus, and easier voice and increased vocal stamina with a more appropriate pitch, loudness, and vocal quality 80% of the time for vocal ADL/IADL tasks as measured by objective measurements, SLP assessment and SLP/patient agreement for perceptual judgment.          PLAN  []  Upgrade activities as tolerated     [x]  Continue plan of care  []  Discharge due to:__  [x] Other: Tx initiated     FER Stockton 5/24/2019  2:12 PM   MA, [address] [facility]  Speech-Language Pathologist

## 2019-05-24 NOTE — PROGRESS NOTES
[facility]  [facility]  [address] [place] [address]  [phone] fax    Plan of Care/ Statement of Necessity for Speech Therapy Services    Patient name: Drew Dominguez Start of Care: 2019   Referral source: Cristy Blackwell MD : 1955    Medical Diagnosis: Dysphonia [R49.0]  Payor: Eugene Salinas / Plan: Carry Kostas Saba 77 PPO / Product Type: Commerical /  Onset Date: end 2019    Treatment Diagnosis: R 49.0   Prior Hospitalization: see medical history Provider#: [id number]   Medications: Verified on Patient summary List    Comorbidities: Hx Lung Ca s/p Right lobectomy; COPD; Asthma; sleep apnea; obesity; hearing impairment; arthritis; GERD/LPR   Prior Level of Function: Voice/Speech/Language/swallowing WNLS. Bilateral SNHL. The Plan of Care and following information is based on the information from the initial evaluation. Assessment/ key information: This 72 y/o female was referred to O/P ST d/t pt reported abrupt aphonia x4 weeks, followed by abrupt onset of vocal hoarseness since 2019. She has a significant medical hx of GERD/LPR and asthma/ COPD. Pt reports she was recently started on a nasal spray and PPI (which she is currently taking after meals). 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 She reports heavy consumption: caffeine in forms of [facility], coffee, and tea through out the day. She reported consuming ~ of 32 oz of water daily. She eats fried foods and garlic often, and stated, \"I like hot sauce\". Pt denies consumption of citrus fruits. Rarely she consumes alcohol.  Immediately pt was noted to be excessively clearing her throat throughout today's evaluation, prior to pt education.        A voice evaluation was completed today along with teaching session. Patient exhibited a moderate  dyphonia c/b a moderately hoarse, harsh, rough,  and  Intermittently strained vocal quality in conversation and  vocal tasks. She spoke with a back throat focus. She exhibited thoracic breathing during structured vocal tasks and conversation and reduced respiration to speech coordination, and coughed after vocal tasks. Perceptual assessment:  Consensus Auditory Perceptual Evaluation of Voice (CAPE-V) was administered by this SLP :On a scale of 0 to 100 with 100 equaling the most extreme deviance she attained the following scores: (see attached in paper chart):  Overall Severity: 39/100 consistent; moderately deviant, roughness: 35/100 consistent moderately deviant , breathiness 31 /100 consistent moderately deviant; strain: 46/100 intermittent, moderate-severly deviant; pitch (too low) 25/100 consistent;  moderately deviant; loudness: (too soft)  31/100 inconsistent; mild-moderately deviant      Patient completed Voice Handicap Index (VHI): (see attached in her paper chart): She score agreement or disagreement for statements that many people have used to describe their voices and the effects of their voices on their lives. It is self scored with score choices 0 through 4. Zero indicates 'never handicapping' and 4 indicating 'always handicapping'. The higher the score the more handicapping. There are 40 points possible for each of the 3 sections and also a total handicapping score of 120 points. For functional she attained a score of 13 points (moderatelly handicapping); for physical she scored 13 points (WNLS); and for emotional she scored 8 points (severely handicapping). She attained a total score of 34 points (mildly handicapping). She rated herself a score of  7  on a 7 point scale (extremely talkative).       She completed LPR Symptoms Index: See attached in chart. (0= no problem 5=severe problem) She rated the following as 4: hoarseness or problem with her voice. She rated the following as 3: clearing her throat;  troublesome or annoying cough. She rated the following as 1: breathing difficulty or choking episodes. She rated the following as 0: excess mucus or postnasal drip, coughing after eating or after lying down; sensation of something sticking in her throat or lump in her throat; heartburn, chest pain, indigestion, or stomach acid coming up; difficulty swallowing.      After Max modeling and cues: Vowel prolongation /a/ (a measure of respiratory/phonatory efficiency):  Duration: ~6.1  seconds;  pitch: 207 Hz, Intensity:  ~86 dB   Vocal quality:  Hoarse, hard glottal attacks, Strained, increased pitch and intensity. Excessive tension observed in the neck,  and mandible during the prolongation.         Frequency (as measured by chromatic tuner and keyboard):  Pitch Range: 17 Semi-tones                    Lowest: 155 Hz               Highest: 415 Hz              Modal pitch in conversation: 164 Hz- too low  Modal pitch in readin Hz    Following today's evaluation, SLP provided a teaching session, including (1) how to inhibit a throat clear; (2) How to take PPI for best absorption (3) GERD/LPR triggers; (4) recommended lifestyle changes, ie: reducing caffeine, no snacking throughout the night, diet changes, etc.      It is recommended that maryuri receive skilled speech therapy services as it is medically necessary to improve her voice for vocal ADL tasks related to home and community, and for her QOL.      Problem List:      []aphasic  []dysarthric  []dysphagic       []alexic  []agraphic  [x]dysphonia       []dysfluency   []Cognitive-Linguistic Disorder       []other   Treatment Plan may include any combination of the following: Voice Treatment and Patient Education      Patient / Family readiness to learn indicated by: asking questions, trying to perform skills and interest    Persons(s) to be included in education:   patient (P)    Barriers to Learning/Limitations: yes;  sensory deficits-vision/hearing/speech    Patient Goal (s): Improve voice.     Patient Self Reported Health Status: excellent    Rehabilitation Potential: good    1. Patient will demonstrate good vocal hygiene by  (a) decreasing coughing by ~50% (b) decreasing throat clearing by 50% (c) consume ~ 64 oz of water daily and (d) taking her PPI correctly for best absorption (e) decreasing caffeine by >50% in order to decrease trauma/irritation to the vocal folds to help improve her voice.     2. Patient will use diaphragmatic/abdominal breath management with good connection of airflow to phonation for automatics, words, phrases and simple sentences with 80% acc with min-mod cues during structured phonation/speaking tasks to improve breath support/coordination warranted for improved voicing.     3. Patient will demonstrate a more forward placement of tone, easy onset of phonation and a legato speaking style with adequate loudness, decreased tension and improved pitch (higher) for automatics, words, phrases and short sentences with 70% acc with min-mod cues in order to improve her voice.       4. Patient will perform vocal entrainment exercises with min-mod A in order to improve the balance between respiration, phonation and resonance and improve vocal strength and flexibility for improved vocal health and voice production.         Long Term Goals:  To be accomplished in 12 weeks             1. Patient will produce improved voice to mild dysphonia to WNL's including decreased tension, increased facial focus, and easier voice and increased vocal stamina with a more appropriate pitch, loudness, and vocal quality 80% of the time for vocal ADL/IADL tasks as measured by objective measurements, SLP assessment and SLP/patient agreement for perceptual judgment.        Frequency / Duration: Patient to be seen 1-2 times per week for 12 weeks:    Patient/ Caregiver education and instruction: Diagnosis; prognosis;  mutually establish goals for POC;  pt education re: vocal hygiene/hydration, how to take PPI for best absorption, how to inhibit vocal misuse(s), and GERD/LPR triggers. Lory Blackwell, SLP 5/24/2019 3:19 PM  MA, [address] [place] [address] Speech-Language Pathologist  ________________________________________________________________________    I certify that the above Therapy Services are being furnished while the patient is under my care. I agree with the treatment plan and certify that this therapy is necessary.     Physician's Signature:____________Date:_________TIME:________    ** Signature, Date and Time must be completed for valid certification **    Please sign and return to [facility]  [facility]  [address] [place] [address]  [phone] fax     Thank you

## 2019-06-03 ENCOUNTER — APPOINTMENT (OUTPATIENT)
Dept: PHYSICAL THERAPY | Age: 64
End: 2019-06-03

## 2019-08-01 NOTE — PROGRESS NOTES
[facility]  [address] [facility] [address]  [place], [address]  [phone] fax    Physical Therapy Discharge Summary  Patient name: Shanta Keene Start of Care: 3/29/2019   Referral source: Javier Castrejon MD : 1955   Medical/Treatment Diagnosis: Pain in right knee [M25.561]  Pain in left knee [M25.562]  Payor: [employer] / Plan: Charito Gasca 77 PPO / Product Type: Commerical /  Onset Date:2019     Prior Hospitalization: see medical history Provider#: [id number]   Medications: Verified on Patient Summary List    Comorbidities: lobectomy right lung, hysterectomy, 2x right knee arthroscopy, bone removal in left foot per pt   Prior Level of Function: ambulatory void of knee pain, working at [employer]  Visits from [place] of Care: 6    Missed Visits: 6  Reporting Period : 3/29/2019 to 2019    Summary of Care:  Updated Goals: to be achieved in 3 weeks:     1. Patient will report FOTO score of 50 or better to indicate significant improvement in functional status. -progressed to 48; 19  2. Pt will demonstrate 30 second sit to stand score of 10 reps or better to improve ease of transfers.- met 19  3. Pt will demonstrate 125 or better knee flexion AROM bilat to improve ease of functional mobility. - Not met; progressed to left 105 degrees; right 110 degrees.  19  4. Pt will demonstrate normalized gait mechanics to improve community ambulation ability.- progressing, decreased gait deviations noted when ambulating into pool room. 19    ASSESSMENT/RECOMMENDATIONS: As of last appt on 2019, pt had reported decreased pain and improved functional mobility, but continued to demonstrate limitations in knee ROM. She has since not f/u in PT and has now self DC. Unable to further assess progress towards goals at this time due to non-compliance/lack of attendance.   DC at this time with no further instructions to the patient.   Thank you for this referral.    [x]Discontinue therapy: []Patient has reached or is progressing toward set goals      [x]Patient is non-compliant or has abdicated      []Due to lack of appreciable progress towards set goals    Estefanía Tong PT, DPT, ATC 8/1/2019 9:25 AM

## 2019-08-28 LAB
25(OH)D3+25(OH)D2 SERPL-MCNC: 27.2 NG/ML (ref 30–100)
BUN SERPL-MCNC: 10 MG/DL (ref 8–27)
BUN/CREAT SERPL: 16 (ref 12–28)
CALCIUM SERPL-MCNC: 9.3 MG/DL (ref 8.7–10.3)
CHLORIDE SERPL-SCNC: 102 MMOL/L (ref 96–106)
CO2 SERPL-SCNC: 25 MMOL/L (ref 20–29)
CREAT SERPL-MCNC: 0.63 MG/DL (ref 0.57–1)
GLUCOSE SERPL-MCNC: 110 MG/DL (ref 65–99)
POTASSIUM SERPL-SCNC: 4.6 MMOL/L (ref 3.5–5.2)
SODIUM SERPL-SCNC: 145 MMOL/L (ref 134–144)

## 2019-09-03 NOTE — PROGRESS NOTES
71 y.o. BLACK OR  female who presents for evaluation    Denied any cp, sob, pnd although she's noted some BLE edema lately. She has not seen Dr Tristan Hadley in [address] year    No gi or gu complaints outside of frequent belching and intermittent non-exertional discomfort in the chest    Remains on inhalers as below and no wheezing, chronic cough, sob    Denies polyuria, polydipsia, nocturia, vision change. Not checking sugars at this time. She has done better with inc the salads and cutting back carbs, did not do IF    Vitals 9/5/2019 4/30/2019 3/20/2019 3/19/2019 2/19/2019   Weight 220 lb 219 lb 221 lb 221 lb 6.4 oz 219 lb 3.2 oz     Past Medical History:   Diagnosis Date    Asthma     Dr. Keyon Calzada [facility])     Lung Cancer s/p right lobectomy Dr Celestine Man 10/06    Chronic obstructive pulmonary disease ([facility] [address]     and right lung nodules, stable on serial CT last 6/14    Colon adenoma     2009 Dr. Reagan Kamara; adenoma and diverticulosis 9/12 Dr. Mitzy Washburn; 12/26/17    FHx: breast cancer     Glaucoma     Dr Sarah Benítez    Hearing loss     Hyperlipidemia 5/14/2018    Hypovitaminosis D 2011    IFG (impaired fasting glucose)     Morbid obesity ([facility] [address]     IF 2/19 start weight 219 lbs    PUD (peptic ulcer disease)     h pylori tx'ed    Pulmonary nodule 06/2014    4 nodules <4mm; stable 5/18    Sleep apnea     on CPAP, Dr. Rowdy Brito 2010    Venous insufficiency      Past Surgical History:   Procedure Laterality Date    CARDIAC SURG PROCEDURE UNLIST  11/07    thallium negative    CARDIAC SURG PROCEDURE UNLIST  12/2017    echo showed nl lv, ef 65%, gr 1 dd; Dr Mckayla Rodgers  09/2016    ex stress echo without clear wma Dr Christal Morocho  11/07    negative    EGD      PUD on EGD, H pylori +txed 2009.       Viviane Sutton 2009 adenoma; Dr Mitzy Washburn 9/12  adenoma and tics; 12/26/17 sessile serrated and tubular adenomas and tics    HX HEENT      thyroid US negative     HX HYSTERECTOMY      for fibroids    HX LOBECTOMY      for cancer, Dr. Kvng Roman 10/06. No adjuvant tx. She has another spot on the same side that is being followed every three months.  HX ORTHOPAEDIC      Left foot ([place]) and knee surgery x2 Dr. James Alva.   Right foot fx  Dr Wilkins Severe [address]T [address]      negative     Social History     Socioeconomic History    Marital status: SINGLE     Spouse name: Not on file    Number of children: 0    Years of education: Not on file    Highest education level: Not on file   Occupational History    Occupation: [employer]   Social Needs    Financial resource strain: Not on file    Food insecurity:     Worry: Not on file     Inability: Not on file    Transportation needs:     Medical: Not on file     Non-medical: Not on file   Tobacco Use    Smoking status: Former Smoker     Packs/day: 0.50     Years: 20.00     Pack years: 10.00     Types: Cigarettes     Last attempt to quit: 1990     Years since quittin.6    Smokeless tobacco: Never Used   Substance and Sexual Activity    Alcohol use: No    Drug use: No    Sexual activity: Not Currently   Lifestyle    Physical activity:     Days per week: Not on file     Minutes per session: Not on file    Stress: Not on file   Relationships    Social connections:     Talks on phone: Not on file     Gets together: Not on file     Attends Baptism service: Not on file     Active member of club or organization: Not on file     Attends meetings of clubs or organizations: Not on file     Relationship status: Not on file    Intimate partner violence:     Fear of current or ex partner: Not on file     Emotionally abused: Not on file     Physically abused: Not on file     Forced sexual activity: Not on file   Other Topics Concern    Not on file   Social History Narrative    Not on file Family History   Problem Relation Age of Onset    Breast Cancer Mother     Other Brother         SLE    Other Brother         SLE    Other Brother         SLE    Hypertension Father      Immunization History   Administered Date(s) Administered    TD Vaccine 01/01/1990     Current Outpatient Medications   Medication Sig    cholecalciferol (VITAMIN D3) 1,000 unit tablet Take  by mouth daily.  triamterene-hydroCHLOROthiazide (DYAZIDE) 37.5-25 mg per capsule Take 1 Cap by mouth daily as needed (swelling).  ergocalciferol (VITAMIN D2) 50,000 unit capsule Take 1 Cap by mouth every seven (7) days.  omeprazole (PRILOSEC) 40 mg capsule Take 1 Cap by mouth daily.  potassium chloride (K-DUR, KLOR-CON) 10 mEq tablet Take 1 Tab by mouth daily.  magnesium oxide (MAG-OX) 400 mg tablet Take 400 mg by mouth daily.  ascorbic acid (VITAMIN C) 250 mg tablet Take 250 mg by mouth daily.  beclomethasone (QVAR) 80 mcg/actuation inhaler Take 1 Puff by inhalation two (2) times a day.  albuterol (PROVENTIL HFA, VENTOLIN HFA) 90 mcg/actuation inhaler Take 2 Puffs by inhalation every four (4) hours as needed.  multivitamin (ONE A DAY) tablet Take 1 Tab by mouth daily.  diclofenac sodium (PENNSAID) 20 mg/gram /actuation(2 %) sopm 2 Pump(s) by Apply Externally route two (2) times daily as needed for Pain.  diclofenac (VOLTAREN) 1 % gel Apply  to affected area four (4) times daily. USE AS DIRECTED    traMADol (ULTRAM) 50 mg tablet Take 1 Tab by mouth every six (6) hours as needed for Pain. Max Daily Amount: 200 mg.    lidocaine-EPINEPHrine (XYLOCAINE) 1 %-1:100,000 injection 10 mL by IntraDERMal route once for 1 dose. Indications: ADMINISTRATION OF LOCAL ANESTHESIA     No current facility-administered medications for this visit.       Allergies   Allergen Reactions    Naproxen Unable to Obtain and Other (comments)    Penicillins Rash, Hives and Swelling    Advair Diskus [Fluticasone Propion-Salmeterol] Other (comments)     Hoarseness    Nsaids (Non-Steroidal Anti-Inflammatory Drug) Other (comments)     H/o PUD     REVIEW OF SYSTEMS: mammo 11/17, hyst, colo Dr Kevin Harmon 9/12  Ophtho  no vision change or eye pain  Oral  no mouth pain, tongue or tooth problems  Ears  no hearing loss, ear pain, fullness, no swallowing problems  Cardiac  no CP, PND, orthopnea, palpitations or syncope  Chest  no breast masses  Resp  no wheezing, chronic coughing, dyspnea  GI  no heartburn, nausea, vomiting, change in bowel habits, bleeding, hemorrhoids  Urinary  no dysuria, hematuria, flank pain, urgency, frequency  Genitals  no genital lesions, discharge, masses, ulceration, warts  Ortho  no swelling, dec ROM, myalgias  Endo - no polyuria, polydipsia, nocturia, hot flashes    Visit Vitals  /88   Pulse 86   Temp 99.3 °F (37.4 °C) (Oral)   Resp 14   Ht 5' 3\" (1.6 m)   Wt 220 lb (99.8 kg)   SpO2 95%   BMI 38.97 kg/m²     A&O x3  Affect is appropriate. Mood stable  No apparent distress  Anicteric, no JVD, adenopathy or thyromegaly. No carotid bruits or radiated murmur  Chest wall tenderness right lower ribcage region, no bruising  Lungs clear to auscultation, no wheezes or rales  Heart showed regular rate and rhythm. No murmur, rubs, gallops  Abdomen soft nontender, no hepatosplenomegaly or masses.    Extremities with 1+ edema symmetrically, varicose veins noted bilat    LABS  From 8/12 showed   gluc 96,   cr 0.63, gfr 115, alt 10,           chol 197, tg 77, hdl 82, ldl-c 100, wbc 5.4, hb 12.6, plt 242, tsh 0.81, ua neg  From 3/14 showed   gluc 105, cr 0.58, gfr 101,     hba1c 5.8  From 6/15 showed   gluc 97,   cr 0.73, gfr>60,  alt<5,  hba1c 5.6, chol 192, tg 56, hdl 73, ldl-c 108, wbc 5.4, hb 12.3, plt 249, tsh 0.96, vit d 12.7, hiv neg  From 11/15 showed                            ddimer neg  From 5/16 showed   gluc 99,   cr 0.62, gfr 112,          wbc 5.9, hb 12.5, plt 258, tsh 1.04, ua neg, proBNP 29  From 10/17 showed gluc 100, cr 0.71, gfr 91,   alt 16, hba1c 5.2, chol 212, tg 53, hdl 92, ldl-c 109, wbc 5.4, hb 12.9, plt 253,             vit d 12.8  From 4/18 showed   gluc 100, cr 0.57, gfr 99,          wbc 6.5, hb 11.6, plt 274, uric 6.0, esr 19  From 8/18 showed   gluc 84,   cr 0.50, gfr 103, alt 6,                     vit d 19.0  From 2/19 showed   gluc 109, cr 0.73, gfr 87,            chol 199, tg 66, hdl 84, ldl-c 102,               vit d 17.4    Patient Active Problem List   Diagnosis Code    Colon adenoma and diverticulosis Dr. Purvis Number 2012 D12.6    Sleep apnea Dr. Rodri Irwin 2010 G47.30    Venous insufficiency I87.2    Hypovitaminosis D E55.9    Hx of cancer of lung 2006 s/p resection Z85.118    IFG (impaired fasting glucose) R73.01    COPD (chronic obstructive pulmonary disease) (HCC) J44.9    Pulmonary nodules R91.8    Hyperlipidemia E78.5    Severe obesity (BMI 35.0-39. 9) with comorbidity ([address] E66.01     Assessment and plan:  1. Morbid obesity. Lifestyle and dietary measures. Portion control reiterated. 2. Colon adenoma and diverticulosis. Fiber, colo 2022  3. MIMI on cpap. F/U Dr. Rodri Irwin  4. Hypovit d. Continue supp  5. H/O lung ca and lung nodules. F/U CT 8/19 neg, f/u Dr Johnny Draper  6. IFG. Wt loss would be ideal.  Lifestyle and dietary measures as above  7. Edema. Prn dyazide  8. Belching. Omeprazole         RTC 2/20    Above conditions discussed at length and patient vocalized understanding.   All questions answered to patient satisfaction

## 2019-09-05 ENCOUNTER — OFFICE VISIT (OUTPATIENT)
Dept: INTERNAL MEDICINE CLINIC | Age: 64
End: 2019-09-05

## 2019-09-05 ENCOUNTER — TELEPHONE (OUTPATIENT)
Dept: INTERNAL MEDICINE CLINIC | Age: 64
End: 2019-09-05

## 2019-09-05 VITALS
HEIGHT: 63 IN | OXYGEN SATURATION: 95 % | DIASTOLIC BLOOD PRESSURE: 88 MMHG | RESPIRATION RATE: 14 BRPM | TEMPERATURE: 99.3 F | BODY MASS INDEX: 38.98 KG/M2 | HEART RATE: 86 BPM | SYSTOLIC BLOOD PRESSURE: 126 MMHG | WEIGHT: 220 LBS

## 2019-09-05 DIAGNOSIS — R60.9 EDEMA, UNSPECIFIED TYPE: ICD-10-CM

## 2019-09-05 DIAGNOSIS — R73.01 IFG (IMPAIRED FASTING GLUCOSE): ICD-10-CM

## 2019-09-05 DIAGNOSIS — K21.9 GASTROESOPHAGEAL REFLUX DISEASE WITHOUT ESOPHAGITIS: ICD-10-CM

## 2019-09-05 DIAGNOSIS — D12.6 COLON ADENOMA: ICD-10-CM

## 2019-09-05 DIAGNOSIS — E55.9 HYPOVITAMINOSIS D: Primary | ICD-10-CM

## 2019-09-05 DIAGNOSIS — E78.5 HYPERLIPIDEMIA, UNSPECIFIED HYPERLIPIDEMIA TYPE: ICD-10-CM

## 2019-09-05 DIAGNOSIS — E55.9 VITAMIN D DEFICIENCY: ICD-10-CM

## 2019-09-05 DIAGNOSIS — I87.2 VENOUS INSUFFICIENCY: ICD-10-CM

## 2019-09-05 DIAGNOSIS — G47.33 OBSTRUCTIVE SLEEP APNEA SYNDROME: ICD-10-CM

## 2019-09-05 DIAGNOSIS — J44.9 CHRONIC OBSTRUCTIVE PULMONARY DISEASE, UNSPECIFIED COPD TYPE (HCC): ICD-10-CM

## 2019-09-05 RX ORDER — OMEPRAZOLE 40 MG/1
40 CAPSULE, DELAYED RELEASE ORAL DAILY
Qty: 90 CAP | Refills: 3 | Status: SHIPPED | OUTPATIENT
Start: 2019-09-05 | End: 2020-09-28

## 2019-09-05 RX ORDER — ERGOCALCIFEROL 1.25 MG/1
50000 CAPSULE ORAL
Qty: 13 CAP | Refills: 3 | Status: SHIPPED | OUTPATIENT
Start: 2019-09-05 | End: 2020-03-26 | Stop reason: SDUPTHER

## 2019-09-05 RX ORDER — MELATONIN
DAILY
COMMUNITY
End: 2020-03-26 | Stop reason: ALTCHOICE

## 2019-09-05 RX ORDER — TRIAMTERENE AND HYDROCHLOROTHIAZIDE 37.5; 25 MG/1; MG/1
1 CAPSULE ORAL
Qty: 30 CAP | Refills: 11 | Status: SHIPPED | OUTPATIENT
Start: 2019-09-05 | End: 2020-09-28

## 2019-09-05 NOTE — PROGRESS NOTES
Linwood Jv presents today for   Chief Complaint   Patient presents with    Vitamin D Deficiency     follow up with labs              Depression Screening:  3 most recent PHQ Screens 3/20/2019   Little interest or pleasure in doing things Not at all   Feeling down, depressed, irritable, or hopeless Not at all   Total Score PHQ 2 0       Learning Assessment:  Learning Assessment 2/19/2019   PRIMARY LEARNER Patient   HIGHEST LEVEL OF EDUCATION - PRIMARY LEARNER  GRADUATED HIGH SCHOOL OR GED   BARRIERS PRIMARY LEARNER NONE   CO-LEARNER CAREGIVER No   PRIMARY LANGUAGE ENGLISH   LEARNER PREFERENCE PRIMARY DEMONSTRATION   ANSWERED BY patient   RELATIONSHIP SELF       Abuse Screening:  Abuse Screening Questionnaire 2/19/2019   Do you ever feel afraid of your partner? N   Are you in a relationship with someone who physically or mentally threatens you? N   Is it safe for you to go home? Y       Fall Risk  Fall Risk Assessment, last 12 mths 1/18/2019   Able to walk? Yes   Fall in past 12 months? Yes   Fall with injury? Yes   Number of falls in past 12 months 1   Fall Risk Score 2           Coordination of Care:  1. Have you been to the ER, urgent care clinic since your last visit? Hospitalized since your last visit? no    2. Have you seen or consulted any other health care providers outside of the [address] [place] [address] since your last visit? Include any pap smears or colon screening.  no

## 2020-03-09 ENCOUNTER — DOCUMENTATION ONLY (OUTPATIENT)
Dept: INTERNAL MEDICINE CLINIC | Age: 65
End: 2020-03-09

## 2020-03-21 LAB
25(OH)D3+25(OH)D2 SERPL-MCNC: 19 NG/ML (ref 30–100)
CHOLEST SERPL-MCNC: 200 MG/DL (ref 100–199)
ERYTHROCYTE [DISTWIDTH] IN BLOOD BY AUTOMATED COUNT: 12.6 % (ref 11.7–15.4)
HCT VFR BLD AUTO: 35.6 % (ref 34–46.6)
HDLC SERPL-MCNC: 70 MG/DL
HGB BLD-MCNC: 11.9 G/DL (ref 11.1–15.9)
INTERPRETATION, 910389: NORMAL
LDLC SERPL CALC-MCNC: 117 MG/DL (ref 0–99)
MCH RBC QN AUTO: 30.9 PG (ref 26.6–33)
MCHC RBC AUTO-ENTMCNC: 33.4 G/DL (ref 31.5–35.7)
MCV RBC AUTO: 93 FL (ref 79–97)
PLATELET # BLD AUTO: 263 X10E3/UL (ref 150–450)
RBC # BLD AUTO: 3.85 X10E6/UL (ref 3.77–5.28)
TRIGL SERPL-MCNC: 65 MG/DL (ref 0–149)
VLDLC SERPL CALC-MCNC: 13 MG/DL (ref 5–40)
WBC # BLD AUTO: 5.9 X10E3/UL (ref 3.4–10.8)

## 2020-03-26 ENCOUNTER — VIRTUAL VISIT (OUTPATIENT)
Dept: INTERNAL MEDICINE CLINIC | Age: 65
End: 2020-03-26

## 2020-03-26 DIAGNOSIS — J44.9 CHRONIC OBSTRUCTIVE PULMONARY DISEASE, UNSPECIFIED COPD TYPE (HCC): ICD-10-CM

## 2020-03-26 DIAGNOSIS — R73.01 IFG (IMPAIRED FASTING GLUCOSE): ICD-10-CM

## 2020-03-26 DIAGNOSIS — Z12.31 SCREENING MAMMOGRAM FOR HIGH-RISK PATIENT: Primary | ICD-10-CM

## 2020-03-26 DIAGNOSIS — Z11.59 NEED FOR HEPATITIS C SCREENING TEST: ICD-10-CM

## 2020-03-26 DIAGNOSIS — D12.6 COLON ADENOMA: ICD-10-CM

## 2020-03-26 DIAGNOSIS — Z85.118 HX OF CANCER OF LUNG: ICD-10-CM

## 2020-03-26 DIAGNOSIS — G47.33 OSA (OBSTRUCTIVE SLEEP APNEA): ICD-10-CM

## 2020-03-26 DIAGNOSIS — E55.9 HYPOVITAMINOSIS D: ICD-10-CM

## 2020-03-26 DIAGNOSIS — E66.01 SEVERE OBESITY (BMI 35.0-39.9) WITH COMORBIDITY (HCC): ICD-10-CM

## 2020-03-26 DIAGNOSIS — E78.5 HYPERLIPIDEMIA, UNSPECIFIED HYPERLIPIDEMIA TYPE: ICD-10-CM

## 2020-03-26 PROBLEM — R91.8 PULMONARY NODULES: Status: RESOLVED | Noted: 2018-05-14 | Resolved: 2020-03-26

## 2020-03-26 RX ORDER — ERGOCALCIFEROL 1.25 MG/1
50000 CAPSULE ORAL
Qty: 13 CAP | Refills: 3 | Status: SHIPPED | OUTPATIENT
Start: 2020-03-26 | End: 2020-10-16 | Stop reason: SDUPTHER

## 2020-03-26 NOTE — PROGRESS NOTES
Sylvia Hernandes is a 71 y.o. female who was seen by synchronous (real-time) audio-video technology on 3/26/2020. Consent:  She and/or her healthcare decision maker is aware that this patient-initiated Telehealth encounter is a billable service, with coverage as determined by her insurance carrier. She is aware that she may receive a bill and has provided verbal consent to proceed: Yes    I was in the office while conducting this encounter. Assessment & Plan:   Diagnoses and all orders for this visit:    1. Screening mammogram for high-risk patient  -     [facility] MAMMO BI SCREENING INCL CAD; Future    2. Hypovitaminosis D  -     VITAMIN D, 25 HYDROXY; Future  -     ergocalciferol (Vitamin D2) 1,250 mcg (50,000 unit) capsule; Take 1 Cap by mouth every seven (7) days. 3. Colon adenoma and diverticulosis Dr. Diamond Burleson 2012    4. IFG (impaired fasting glucose)  -     HEMOGLOBIN A1C W/O EAG; Future  -     METABOLIC PANEL, COMPREHENSIVE; Future  -     URINALYSIS W/ RFLX MICROSCOPIC; Future    5. Chronic obstructive pulmonary disease, unspecified COPD type ([facility] [address]    6. Hx of cancer of lung 2006 s/p resection    7. Hyperlipidemia, unspecified hyperlipidemia type  -     LIPID PANEL; Future    8. Severe obesity (BMI 35.0-39. 9) with comorbidity ([facility] [address]    9. MIMI (obstructive sleep apnea)    10. Need for hepatitis C screening test  -     HEPATITIS C AB; Future              Coding Help - Use CPT Codes 88625-46215, [id number] for Established and New Patients respectively, either employing EM elements or Time rules. Other codes (example consult codes) may also apply. I spent at least 25 minutes with this established patient, and >50% of the time was spent counseling and/or coordinating care regarding routine f/u  712  Subjective:   Sylvia Hernandes was seen for Cholesterol Problem (6 month follow up with labs)    71 y.o.  BLACK OR  female who presents for evaluation    Denied any cp, sob, pnd although she's noted some BLE edema lately. She has not seen Dr Soraya Parsons in [facility] [place] year    No gi or gu complaints outside of frequent belching and intermittent non-exertional discomfort in the chest    Remains on inhalers as below and no wheezing, chronic cough, sob    Denies polyuria, polydipsia, nocturia, vision change. Not checking sugars at this time. She has done better with inc the salads and cutting back carbs, did not do IF    Vitals 9/5/2019 4/30/2019 3/20/2019 3/19/2019 2/19/2019   Weight 220 lb 219 lb 221 lb 221 lb 6.4 oz 219 lb 3.2 oz     LAST MEDICARE WELLNESS EXAM: never as not medicare b    Past Medical History:   Diagnosis Date    Asthma     Dr. Jaclyn Gordon [facility])     Lung Cancer s/p right lobectomy Dr Leena Lemus 10/06    Chronic obstructive pulmonary disease ([facility] [address]     and right lung nodules, stable on serial CT last 6/14    Colon adenoma     2009 Dr. Ean Stephenson; adenoma and diverticulosis 9/12 Dr. Torin Gupta; 12/26/17    FHx: breast cancer     Glaucoma     Dr Damaris Orosco    Hearing loss     Hyperlipidemia 05/14/2018    calculated 10 year risk score 8.3% (3/20)    Hypovitaminosis D 2011    IFG (impaired fasting glucose)     Morbid obesity ([facility] [address]     IF 2/19 start weight 219 lbs    PUD (peptic ulcer disease)     h pylori tx'ed    Pulmonary nodule 06/2014    4 nodules <4mm; stable 5/18    Sleep apnea     on CPAP, Dr. Babs Pena 2010    Venous insufficiency      Past Surgical History:   Procedure Laterality Date    CARDIAC SURG PROCEDURE UNLIST      thallium negative (11/07); stress echo neg (9/16)    CARDIAC SURG PROCEDURE UNLIST  12/2017    echo showed nl lv, ef 65%, gr 1 dd; Dr Rin Patiño W CONT  11/07    negative    DUPLEX LOWER EXT VEIN BILAT W MANEUVERS  6/11    negative    EGD      PUD on EGD, H pylori +txed 2009.       Silvano Labrum      Dr Bette Chris 2009 adenoma; Dr Torin Gupta 9/12  adenoma and tics; 12/26/17 sessile serrated and tubular adenomas and tics    HX HEENT      thyroid US negative     HX HYSTERECTOMY      for fibroids    HX LOBECTOMY      for cancer, Dr. Rosa Isela Coelho 10/06. No adjuvant tx. She has another spot on the same side that is being followed every three months.  HX ORTHOPAEDIC      LEFT foot ([place]) and LEFT knee surgery x2 Dr. Hussain Ibrahim.   RIGHT foot fx  Dr Rivas Nine History     Socioeconomic History    Marital status: SINGLE     Spouse name: Not on file    Number of children: 0    Years of education: Not on file    Highest education level: Not on file   Occupational History    Occupation: [employer]   Social Needs    Financial resource strain: Not on file    Food insecurity     Worry: Not on file     Inability: Not on file   [employer] needs     Medical: Not on file     Non-medical: Not on file   Tobacco Use    Smoking status: Former Smoker     Packs/day: 0.50     Years: 20.00     Pack years: 10.00     Types: Cigarettes     Last attempt to quit: 1990     Years since quittin.2    Smokeless tobacco: Never Used   Substance and Sexual Activity    Alcohol use: No    Drug use: No    Sexual activity: Not Currently   Lifestyle    Physical activity     Days per week: Not on file     Minutes per session: Not on file    Stress: Not on file   Relationships    Social connections     Talks on phone: Not on file     Gets together: Not on file     Attends Ep[employer] service: Not on file     Active member of club or organization: Not on file     Attends meetings of clubs or organizations: Not on file     Relationship status: Not on file    Intimate partner violence     Fear of current or ex partner: Not on file     Emotionally abused: Not on file     Physically abused: Not on file     Forced sexual activity: Not on file   Other Topics Concern    Not on file   Social History Narrative    Not on file     Family History   Problem Relation Age of Onset    Breast Cancer Mother     Other Brother         SLE    Other Brother         SLE    Other Brother         SLE    Hypertension Father      Immunization History   Administered Date(s) Administered    TD Vaccine 01/01/1990     Current Outpatient Medications   Medication Sig    ergocalciferol (Vitamin D2) 1,250 mcg (50,000 unit) capsule Take 1 Cap by mouth every seven (7) days.  triamterene-hydroCHLOROthiazide (DYAZIDE) 37.5-25 mg per capsule Take 1 Cap by mouth daily as needed (swelling).  omeprazole (PRILOSEC) 40 mg capsule Take 1 Cap by mouth daily.  potassium chloride (K-DUR, KLOR-CON) 10 mEq tablet Take 1 Tab by mouth daily.  magnesium oxide (MAG-OX) 400 mg tablet Take 400 mg by mouth daily.  ascorbic acid (VITAMIN C) 250 mg tablet Take 250 mg by mouth daily.  beclomethasone (QVAR) 80 mcg/actuation inhaler Take 1 Puff by inhalation two (2) times a day.  albuterol (PROVENTIL HFA, VENTOLIN HFA) 90 mcg/actuation inhaler Take 2 Puffs by inhalation every four (4) hours as needed.  multivitamin (ONE A DAY) tablet Take 1 Tab by mouth daily.  diclofenac sodium (PENNSAID) 20 mg/gram /actuation(2 %) sopm 2 Pump(s) by Apply Externally route two (2) times daily as needed for Pain.  diclofenac (VOLTAREN) 1 % gel Apply  to affected area four (4) times daily. USE AS DIRECTED    traMADol (ULTRAM) 50 mg tablet Take 1 Tab by mouth every six (6) hours as needed for Pain. Max Daily Amount: 200 mg.    lidocaine-EPINEPHrine (XYLOCAINE) 1 %-1:100,000 injection 10 mL by IntraDERMal route once for 1 dose. Indications: ADMINISTRATION OF LOCAL ANESTHESIA     No current facility-administered medications for this visit.       Allergies   Allergen Reactions    Naproxen Unable to Obtain and Other (comments)    Penicillins Rash, Hives and Swelling    Advair Diskus [Fluticasone Propion-Salmeterol] Other (comments)     Hoarseness    Nsaids (Non-Steroidal Anti-Inflammatory Drug) Other (comments)     H/o PUD REVIEW OF SYSTEMS: mammo 11/17, hyst, colo Dr Leann Chou 9/12  Ophtho  no vision change or eye pain  Oral  no mouth pain, tongue or tooth problems  Ears  no hearing loss, ear pain, fullness, no swallowing problems  Cardiac  no CP, PND, orthopnea, palpitations or syncope  Chest  no breast masses  Resp  no wheezing, chronic coughing, dyspnea  GI  no heartburn, nausea, vomiting, change in bowel habits, bleeding, hemorrhoids  Urinary  no dysuria, hematuria, flank pain, urgency, frequency  Genitals  no genital lesions, discharge, masses, ulceration, warts  Ortho  no swelling, dec ROM, myalgias  Endo - no polyuria, polydipsia, nocturia, hot flashes    LABS  From 8/12 showed   gluc 96,   cr 0.63, gfr 115, alt 10,           chol 197, tg 77, hdl 82, ldl-c 100, wbc 5.4, hb 12.6, plt 242, tsh 0.81, ua neg  From 3/14 showed   gluc 105, cr 0.58, gfr 101,     hba1c 5.8  From 6/15 showed   gluc 97,   cr 0.73, gfr>60,  alt<5,  hba1c 5.6, chol 192, tg 56, hdl 73, ldl-c 108, wbc 5.4, hb 12.3, plt 249, tsh 0.96, vit d 12.7, hiv neg  From 11/15 showed                            ddimer neg  From 5/16 showed   gluc 99,   cr 0.62, gfr 112,                        wbc 5.9, hb 12.5, plt 258, tsh 1.04, ua neg, proBNP 29  From 10/17 showed gluc 100, cr 0.71, gfr 91,   alt 16, hba1c 5.2, chol 212, tg 53, hdl 92, ldl-c 109, wbc 5.4, hb 12.9, plt 253,       vit d 12.8  From 4/18 showed   gluc 100, cr 0.57, gfr 99,                        wbc 6.5, hb 11.6, plt 274, uric 6.0, esr 19  From 8/18 showed   gluc 84,   cr 0.50, gfr 103, alt 6,              vit d 19.0  From 2/19 showed   gluc 109, cr 0.73, gfr 87,            chol 199, tg 66, hdl 84, ldl-c 102,                     vit d 17.4  From 8/19 showed   gluc 110, cr 0.63, gfr>60,                          vit d 27.2  From 3/20 showed                chol 200, tg 65, hdl 70, ldl-c 117, wbc 5.9, hb 11.9, plt 263, vit d 19.0    PHYSICAL EXAMINATION:  [ INSTRUCTIONS:  \"[x]\" Indicates a positive item  \"[]\" Indicates a negative item  -- DELETE ALL ITEMS NOT EXAMINED]  Vital Signs: (As obtained by patient/caregiver at home)  There were no vitals taken for this visit. Constitutional: [x] Appears well-developed and well-nourished [x] No apparent distress      [] Abnormal -     Mental status: [x] Alert and awake  [x] Oriented to person/place/time [x] Able to follow commands    [] Abnormal -     Eyes:   EOM    [x]  Normal    [] Abnormal -   Sclera  [x]  Normal    [] Abnormal -          Discharge [x]  None visible   [] Abnormal -     HENT: [x] Normocephalic, atraumatic  [] Abnormal -   [x] Mouth/Throat: Mucous membranes are moist    External Ears [x] Normal  [] Abnormal -    Neck: [x] No visualized mass [] Abnormal -     Pulmonary/Chest: [x] Respiratory effort normal   [x] No visualized signs of difficulty breathing or respiratory distress        [] Abnormal -      Musculoskeletal:   [] Normal gait with no signs of ataxia         [x] Normal range of motion of neck        [] Abnormal -     Neurological:        [x] No Facial Asymmetry (Cranial nerve 7 motor function) (limited exam due to video visit)          [x] No gaze palsy        [] Abnormal -          Skin:        [x] No significant exanthematous lesions or discoloration noted on facial skin         [] Abnormal -            Psychiatric:       [x] Normal Affect [] Abnormal -        [x] No Hallucinations    Other pertinent observable physical exam findings:- none    Assessment and plan:  1. Morbid obesity. Lifestyle and dietary measures. Portion control reiterated. 2. Colon adenoma and diverticulosis. Fiber, colo 2022  3. MIMI on cpap. F/U Dr. Babs Pena  4. Hypovit d. Continue supplementation  5. H/O lung ca and lung nodules. F/U CT after 8/20 to be scheduled, f/u Dr Copeland Shock prn  6. IFG. Wt loss would be ideal.  Lifestyle and dietary measures as above  7. Edema. Prn dyazide  8. HLP.   Declined meds for now, will try to do better with inc activity and cutting back intake and improve diet  9. Screening mammo scheduled  10. PVX 13 and PVX at the next and subsequent visits; advocated shingrix at local pharmacy        [facility] 9/20    We discussed the expected course, resolution and complications of the diagnosis(es) in detail. Medication risks, benefits, costs, interactions, and alternatives were discussed as indicated. I advised her to contact the office if her condition worsens, changes or fails to improve as anticipated. She expressed understanding with the diagnosis(es) and plan. Pursuant to the emergency declaration under the [facility], [facility] waiver authority and the [employer] Act, this Virtual  Visit was conducted, with patient's consent, to reduce the patient's risk of exposure to COVID-19 and provide continuity of care for an established patient. Services were provided through a video synchronous discussion virtually to substitute for in-person clinic visit.     El Ruano MD

## 2020-09-18 ENCOUNTER — TELEPHONE (OUTPATIENT)
Dept: INTERNAL MEDICINE CLINIC | Age: 65
End: 2020-09-18

## 2020-09-18 NOTE — TELEPHONE ENCOUNTER
[facility] for a rash on her right side of body - she was prescribed ointment and she said  \"pills\" as of today  The rash is spreading and itchy , she Is asking to be seen ,is there a time she can be seen today ?

## 2020-09-18 NOTE — TELEPHONE ENCOUNTER
Pt call back wondering why she could not be seen by her pcp. I advised patient there was no openings. Patient wanted to know about next week. I told patient waiting until next week to treat her rash is a long wait and she should go to a different urgent care. Pt expressed how she was unhappy with their services.

## 2020-09-27 NOTE — PROGRESS NOTES
76 y.o. BLACK OR  female who presents for evaluation    She noted onset of a rash earlier this month and ended up at urgent care. Pruritic, located in the right axilla and under the right breast, darkish discoloration. They dx'ed it as bacterial infection and gave her abx and mupirocin ointment and she's had no improvement    She reports mild headache without focal neuro complaints. bp controlled, denied any sinus, ear, sore throat sx, no neck stiffness or photophobia. Started this morning and she has not tried anything for it yet    She reports pain in the left thumb. No injury, has not tried anything for it    LAST MEDICARE WELLNESS EXAM: never as not medicare b    Past Medical History:   Diagnosis Date    Asthma     Dr. Sarah España Chronic obstructive pulmonary disease ([facility]     and right lung nodules, stable on serial CT last 6/14    Colon adenoma     2009 Dr. Kendrick Hirsch; adenoma and diverticulosis 9/12 Dr. Thad Carvalho; 12/26/17    FHx: breast cancer     Glaucoma     Dr Camilo Alvares    Hearing loss     Hyperlipidemia 05/14/2018    calculated 10 year risk score 8.3% (3/20)    Hypovitaminosis D 2011    IFG (impaired fasting glucose) 03/2014    Lung cancer ([facility] 10/2006    s/p RIGHT lobectomy Dr Tom Tapia obesity ([facility]     IF 2/19 start weight 219 lbs    MIMI (obstructive sleep apnea)     on CPAP, Dr. Judy Caraballo 2010    PUD (peptic ulcer disease)     h pylori tx'ed    Pulmonary nodule 06/2014    4 nodules <4mm; stable 5/18    Venous insufficiency      Past Surgical History:   Procedure Laterality Date    CARDIAC SURG PROCEDURE UNLIST      thallium negative (11/07); stress echo neg (9/16)    CARDIAC SURG PROCEDURE UNLIST  12/2017    echo showed nl lv, ef 65%, gr 1 dd; Dr Martell Em W CONT  11/07    negative    DUPLEX LOWER EXT VEIN BILAT W MANEUVERS  6/11    negative    EGD      PUD on EGD, H pylori +txed 2009.      Martha Shea Dr Aakash Mcqueen HX COLONOSCOPY      Dr. Jason Gonzales 2009 adenoma; Dr Opal Peres   adenoma and tics; 17 sessile serrated and tubular adenomas and tics    HX HEENT      thyroid US negative     HX HYSTERECTOMY      for fibroids    HX LOBECTOMY      for cancer, Dr. Paulo Holter 10/06. No adjuvant tx. She has another spot on the same side that is being followed every three months.  HX ORTHOPAEDIC      LEFT foot ([place]) and LEFT knee surgery x2 Dr. Reva Lewis.   RIGHT foot fx  Dr Justen Jones History     Socioeconomic History    Marital status: SINGLE     Spouse name: Not on file    Number of children: 0    Years of education: Not on file    Highest education level: Not on file   Occupational History    Occupation: [employer]   Social Needs    Financial resource strain: Not on file    Food insecurity     Worry: Not on file     Inability: Not on file   [employer] needs     Medical: Not on file     Non-medical: Not on file   Tobacco Use    Smoking status: Former Smoker     Packs/day: 0.50     Years: 20.00     Pack years: 10.00     Types: Cigarettes     Last attempt to quit: 1990     Years since quittin.7    Smokeless tobacco: Never Used   Substance and Sexual Activity    Alcohol use: No    Drug use: No    Sexual activity: Not Currently   Lifestyle    Physical activity     Days per week: Not on file     Minutes per session: Not on file    Stress: Not on file   Relationships    Social connections     Talks on phone: Not on file     Gets together: Not on file     Attends [employer] service: Not on file     Active member of club or organization: Not on file     Attends meetings of clubs or organizations: Not on file     Relationship status: Not on file    Intimate partner violence     Fear of current or ex partner: Not on file     Emotionally abused: Not on file     Physically abused: Not on file     Forced sexual activity: Not on file   Other Topics Concern    Not on file Social History Narrative    Not on file     Family History   Problem Relation Age of Onset    Breast Cancer Mother     Other Brother         SLE    Other Brother         SLE    Other Brother         SLE    Hypertension Father      Immunization History   Administered Date(s) Administered    TD Vaccine 01/01/1990     Current Outpatient Medications   Medication Sig    fluconazole (DIFLUCAN) 150 mg tablet Treat once weekly for 2 weeks    econazole nitrate (SPECTAZOLE) 1 % topical cream Apply  to affected area two (2) times a day.  ergocalciferol (Vitamin D2) 1,250 mcg (50,000 unit) capsule Take 1 Cap by mouth every seven (7) days.  magnesium oxide (MAG-OX) 400 mg tablet Take 400 mg by mouth daily.  ascorbic acid (VITAMIN C) 250 mg tablet Take 250 mg by mouth daily.  beclomethasone (QVAR) 80 mcg/actuation inhaler Take 1 Puff by inhalation two (2) times a day.  albuterol (PROVENTIL HFA, VENTOLIN HFA) 90 mcg/actuation inhaler Take 2 Puffs by inhalation every four (4) hours as needed.  multivitamin (ONE A DAY) tablet Take 1 Tab by mouth daily. No current facility-administered medications for this visit.       Allergies   Allergen Reactions    Naproxen Unable to Obtain and Other (comments)    Penicillins Rash, Hives and Swelling    Advair Diskus [Fluticasone Propion-Salmeterol] Other (comments)     Hoarseness    Nsaids (Non-Steroidal Anti-Inflammatory Drug) Other (comments)     H/o PUD     REVIEW OF SYSTEMS: mammo 11/17, hyst, colo Dr Leilani Kayser 9/12  Ophtho  no vision change or eye pain  Oral  no mouth pain, tongue or tooth problems  Ears  no hearing loss, ear pain, fullness, no swallowing problems  Cardiac  no CP, PND, orthopnea, palpitations or syncope  Chest  no breast masses  Resp  no wheezing, chronic coughing, dyspnea  GI  no heartburn, nausea, vomiting, change in bowel habits, bleeding, hemorrhoids  Urinary  no dysuria, hematuria, flank pain, urgency, frequency  Genitals  no genital lesions, discharge, masses, ulceration, warts  Ortho  no swelling, dec ROM, myalgias  Endo - no polyuria, polydipsia, nocturia, hot flashes    Visit Vitals  /78   Pulse (!) 103   Temp 96.8 °F (36 °C) (Temporal)   Resp 14   Ht 5' 3\" (1.6 m)   Wt 212 lb (96.2 kg)   SpO2 97%   BMI 37.55 kg/m²     A&O x3  Affect is appropriate. Mood stable  No apparent distress  Anicteric, no JVD, adenopathy or thyromegaly. No carotid bruits or radiated murmur  Lungs clear to auscultation, no wheezes or rales  Heart showed regular rate and rhythm. No murmur, rubs, gallops  Abdomen soft nontender, no hepatosplenomegaly or masses. Extremities without edema. Pulses 1-2+ symmetrically.   Mild tenderness left thumb tendon  Probable tinea in the right axillary area and under the left breast    LABS  From 8/12 showed   gluc 96,   cr 0.63, gfr 115, alt 10,           chol 197, tg 77, hdl 82, ldl-c 100, wbc 5.4, hb 12.6, plt 242, tsh 0.81, ua neg  From 3/14 showed   gluc 105, cr 0.58, gfr 101,     hba1c 5.8  From 6/15 showed   gluc 97,   cr 0.73, gfr>60,  alt<5,  hba1c 5.6, chol 192, tg 56, hdl 73, ldl-c 108, wbc 5.4, hb 12.3, plt 249, tsh 0.96, vit d 12.7, hiv neg  From 11/15 showed                            ddimer neg  From 5/16 showed   gluc 99,   cr 0.62, gfr 112,                     wbc 5.9, hb 12.5, plt 258, tsh 1.04, ua neg,     proBNP 29  From 10/17 showed gluc 100, cr 0.71, gfr 91,   alt 16, hba1c 5.2, chol 212, tg 53, hdl 92, ldl-c 109, wbc 5.4, hb 12.9, plt 253,             vit d 12.8  From 4/18 showed   gluc 100, cr 0.57, gfr 99,                     wbc 6.5, hb 11.6, plt 274, uric 6.0,  esr 19  From 8/18 showed   gluc 84,   cr 0.50, gfr 103, alt 6,                              vit d 19.0  From 2/19 showed   gluc 109, cr 0.73, gfr 87,            chol 199, tg 66, hdl 84, ldl-c 102,                vit d 17.4  From 8/19 showed   gluc 110, cr 0.63, gfr>60,                                     vit d 27.2  From 3/20 showed                chol 200, tg 65, hdl 70, ldl-c 117, wbc 5.9, hb 11.9, plt 263,             vit d 19.0    We reviewed the patient's labs from the last several visits to point out trends in the numbers        Patient Active Problem List   Diagnosis Code    Colon adenoma and diverticulosis Dr. Jenniffer Kim 2012 D12.6    MIMI (obstructive sleep apnea) G47.33    Venous insufficiency I87.2    Hypovitaminosis D E55.9    Hx of cancer of lung 2006 s/p resection Z85.118    IFG (impaired fasting glucose) R73.01    COPD [facility]chronic obstructive pulmonary disease) ([facility] J44.9    Hyperlipidemia E78.5    Severe obesity (BMI 35.0-39. 9) with comorbidity ([address] E66.01    Closed fracture of pisiform bone of wrist S62.163A    Cubital tunnel syndrome G56.20    Sprain of interphalangeal joint of finger S63.639A    Sprain of metacarpophalangeal (joint) of hand S63.659A    Ulnar neuritis G56.20     Assessment and plan:  1. Morbid obesity. Lifestyle and dietary measures. Portion control reiterated. 2. Colon adenoma and diverticulosis. Fiber, colo 2022  3. MIMI on cpap. F/U Dr. Dev Thacker  4. Hypovit d. Continue supplementation  5. H/O lung ca and lung nodules. F/U CT after 8/20 to be scheduled, f/u Dr Radha Norton prn  6. IFG. Wt loss would be ideal.  Lifestyle and dietary measures as above  7. Edema. Prn dyazide  8. HLP. Declined meds for now, will try to do better with inc activity and cutting back intake and improve diet  9. Screening mammo scheduled - has not done yet  10. PVX 13 and PVX at the next and subsequent visits; advocated shingrix at local pharmacy  11. Rash. Diflucan 150 x 2 doses a week apart, econzaole cream  12. Tendonitis. Prn nsaid  13. Headache.  Prn excedrin      RTC 3/21

## 2020-09-28 ENCOUNTER — OFFICE VISIT (OUTPATIENT)
Dept: INTERNAL MEDICINE CLINIC | Age: 65
End: 2020-09-28
Payer: COMMERCIAL

## 2020-09-28 VITALS
DIASTOLIC BLOOD PRESSURE: 78 MMHG | HEIGHT: 63 IN | RESPIRATION RATE: 14 BRPM | SYSTOLIC BLOOD PRESSURE: 138 MMHG | WEIGHT: 212 LBS | OXYGEN SATURATION: 97 % | BODY MASS INDEX: 37.56 KG/M2 | HEART RATE: 103 BPM | TEMPERATURE: 96.8 F

## 2020-09-28 DIAGNOSIS — J44.9 CHRONIC OBSTRUCTIVE PULMONARY DISEASE, UNSPECIFIED COPD TYPE (HCC): ICD-10-CM

## 2020-09-28 DIAGNOSIS — G47.33 OSA (OBSTRUCTIVE SLEEP APNEA): ICD-10-CM

## 2020-09-28 DIAGNOSIS — I87.2 VENOUS INSUFFICIENCY: Primary | ICD-10-CM

## 2020-09-28 DIAGNOSIS — Z12.31 SCREENING MAMMOGRAM, ENCOUNTER FOR: ICD-10-CM

## 2020-09-28 DIAGNOSIS — E78.5 HYPERLIPIDEMIA, UNSPECIFIED HYPERLIPIDEMIA TYPE: ICD-10-CM

## 2020-09-28 DIAGNOSIS — Z85.118 HX OF CANCER OF LUNG: ICD-10-CM

## 2020-09-28 DIAGNOSIS — B35.4 TINEA CORPORIS: ICD-10-CM

## 2020-09-28 DIAGNOSIS — R73.01 IFG (IMPAIRED FASTING GLUCOSE): ICD-10-CM

## 2020-09-28 DIAGNOSIS — D12.6 COLON ADENOMA: ICD-10-CM

## 2020-09-28 DIAGNOSIS — R91.8 PULMONARY NODULES: ICD-10-CM

## 2020-09-28 PROCEDURE — 1101F PT FALLS ASSESS-DOCD LE1/YR: CPT | Performed by: INTERNAL MEDICINE

## 2020-09-28 PROCEDURE — G8417 CALC BMI ABV UP PARAM F/U: HCPCS | Performed by: INTERNAL MEDICINE

## 2020-09-28 PROCEDURE — 99214 OFFICE O/P EST MOD 30 MIN: CPT | Performed by: INTERNAL MEDICINE

## 2020-09-28 PROCEDURE — G8510 SCR DEP NEG, NO PLAN REQD: HCPCS | Performed by: INTERNAL MEDICINE

## 2020-09-28 PROCEDURE — 1090F PRES/ABSN URINE INCON ASSESS: CPT | Performed by: INTERNAL MEDICINE

## 2020-09-28 PROCEDURE — 3017F COLORECTAL CA SCREEN DOC REV: CPT | Performed by: INTERNAL MEDICINE

## 2020-09-28 PROCEDURE — G9899 SCRN MAM PERF RSLTS DOC: HCPCS | Performed by: INTERNAL MEDICINE

## 2020-09-28 PROCEDURE — G8427 DOCREV CUR MEDS BY ELIG CLIN: HCPCS | Performed by: INTERNAL MEDICINE

## 2020-09-28 PROCEDURE — G8400 PT W/DXA NO RESULTS DOC: HCPCS | Performed by: INTERNAL MEDICINE

## 2020-09-28 PROCEDURE — G8536 NO DOC ELDER MAL SCRN: HCPCS | Performed by: INTERNAL MEDICINE

## 2020-09-28 RX ORDER — ECONAZOLE NITRATE 10 MG/G
CREAM TOPICAL 2 TIMES DAILY
Qty: 15 G | Refills: 0 | Status: SHIPPED | OUTPATIENT
Start: 2020-09-28 | End: 2020-10-07 | Stop reason: SDUPTHER

## 2020-09-28 RX ORDER — FLUCONAZOLE 150 MG/1
TABLET ORAL
Qty: 2 TAB | Refills: 1 | Status: SHIPPED | OUTPATIENT
Start: 2020-09-28 | End: 2021-11-04

## 2020-09-29 LAB
25(OH)D3+25(OH)D2 SERPL-MCNC: 25.2 NG/ML (ref 30–100)
ALBUMIN SERPL-MCNC: 4.2 G/DL (ref 3.8–4.8)
ALBUMIN/GLOB SERPL: 1.4 {RATIO} (ref 1.2–2.2)
ALP SERPL-CCNC: 113 IU/L (ref 39–117)
ALT SERPL-CCNC: 15 IU/L (ref 0–32)
APPEARANCE UR: CLEAR
AST SERPL-CCNC: 22 IU/L (ref 0–40)
BACTERIA #/AREA URNS HPF: NORMAL /[HPF]
BILIRUB SERPL-MCNC: 0.6 MG/DL (ref 0–1.2)
BILIRUB UR QL STRIP: NEGATIVE
BUN SERPL-MCNC: 8 MG/DL (ref 8–27)
BUN/CREAT SERPL: 9 (ref 12–28)
CALCIUM SERPL-MCNC: 9.3 MG/DL (ref 8.7–10.3)
CASTS URNS QL MICRO: NORMAL /LPF
CHLORIDE SERPL-SCNC: 100 MMOL/L (ref 96–106)
CHOLEST SERPL-MCNC: 197 MG/DL (ref 100–199)
CO2 SERPL-SCNC: 24 MMOL/L (ref 20–29)
COLOR UR: YELLOW
CREAT SERPL-MCNC: 0.85 MG/DL (ref 0.57–1)
EPI CELLS #/AREA URNS HPF: NORMAL /HPF (ref 0–10)
EST. AVERAGE GLUCOSE BLD GHB EST-MCNC: 105 MG/DL
GLOBULIN SER CALC-MCNC: 3.1 G/DL (ref 1.5–4.5)
GLUCOSE SERPL-MCNC: 114 MG/DL (ref 65–99)
GLUCOSE UR QL: NEGATIVE
HBA1C MFR BLD: 5.3 % (ref 4.8–5.6)
HCV AB S/CO SERPL IA: <0.1 S/CO RATIO (ref 0–0.9)
HDLC SERPL-MCNC: 62 MG/DL
HGB UR QL STRIP: NEGATIVE
INTERPRETATION, 910389: NORMAL
KETONES UR QL STRIP: NEGATIVE
LDLC SERPL CALC-MCNC: 122 MG/DL (ref 0–99)
LEUKOCYTE ESTERASE UR QL STRIP: ABNORMAL
MICRO URNS: ABNORMAL
MUCOUS THREADS URNS QL MICRO: PRESENT
NITRITE UR QL STRIP: NEGATIVE
PH UR STRIP: 5.5 [PH] (ref 5–7.5)
POTASSIUM SERPL-SCNC: 5 MMOL/L (ref 3.5–5.2)
PROT SERPL-MCNC: 7.3 G/DL (ref 6–8.5)
PROT UR QL STRIP: NEGATIVE
RBC #/AREA URNS HPF: NORMAL /HPF (ref 0–2)
SODIUM SERPL-SCNC: 137 MMOL/L (ref 134–144)
SP GR UR: 1.02 (ref 1–1.03)
SPECIMEN STATUS REPORT, ROLRST: NORMAL
TRIGL SERPL-MCNC: 73 MG/DL (ref 0–149)
UROBILINOGEN UR STRIP-MCNC: 0.2 MG/DL (ref 0.2–1)
VLDLC SERPL CALC-MCNC: 13 MG/DL (ref 5–40)
WBC #/AREA URNS HPF: NORMAL /HPF (ref 0–5)

## 2020-10-07 DIAGNOSIS — B35.4 TINEA CORPORIS: ICD-10-CM

## 2020-10-07 NOTE — TELEPHONE ENCOUNTER
Last Visit: 9/28/20 with MD Rosenda Wells  Next Appointment: 3/29/21 with MD Rosenda Wells  Previous Refill Encounter(s): 9/28/20 #15g    Requested Prescriptions     Pending Prescriptions Disp Refills    econazole nitrate (SPECTAZOLE) 1 % topical cream 15 g 0     Sig: Apply  to affected area two (2) times a day.

## 2020-10-08 RX ORDER — ECONAZOLE NITRATE 10 MG/G
CREAM TOPICAL 2 TIMES DAILY
Qty: 15 G | Refills: 0 | Status: SHIPPED | OUTPATIENT
Start: 2020-10-08 | End: 2021-11-04

## 2020-10-12 DIAGNOSIS — R91.8 PULMONARY NODULES: ICD-10-CM

## 2020-10-12 DIAGNOSIS — Z85.118 HX OF CANCER OF LUNG: ICD-10-CM

## 2020-10-15 ENCOUNTER — TELEPHONE (OUTPATIENT)
Dept: INTERNAL MEDICINE CLINIC | Age: 65
End: 2020-10-15

## 2020-10-15 DIAGNOSIS — E55.9 HYPOVITAMINOSIS D: ICD-10-CM

## 2020-10-15 NOTE — TELEPHONE ENCOUNTER
pls call    Results for orders placed or performed in visit on 91/34/05   METABOLIC PANEL, COMPREHENSIVE   Result Value Ref Range    Glucose 114 (H) 65 - 99 mg/dL    BUN 8 8 - 27 mg/dL    Creatinine 0.85 0.57 - 1.00 mg/dL    GFR est non-AA 72 >59 mL/min/1.73    GFR est AA 83 >59 mL/min/1.73    BUN/Creatinine ratio 9 (L) 12 - 28    Sodium 137 134 - 144 mmol/L    Potassium 5.0 3.5 - 5.2 mmol/L    Chloride 100 96 - 106 mmol/L    CO2 24 20 - 29 mmol/L    Calcium 9.3 8.7 - 10.3 mg/dL    Protein, total 7.3 6.0 - 8.5 g/dL    Albumin 4.2 3.8 - 4.8 g/dL    GLOBULIN, TOTAL 3.1 1.5 - 4.5 g/dL    A-G Ratio 1.4 1.2 - 2.2    Bilirubin, total 0.6 0.0 - 1.2 mg/dL    Alk. phosphatase 113 39 - 117 IU/L    AST (SGOT) 22 0 - 40 IU/L    ALT (SGPT) 15 0 - 32 IU/L   URINALYSIS W/MICROSCOPIC   Result Value Ref Range    Specific Gravity 1.019 1.005 - 1.030    pH (UA) 5.5 5.0 - 7.5    Color Yellow Yellow    Appearance Clear Clear    Leukocyte Esterase Trace (A) Negative    Protein Negative Negative/Trace    Glucose Negative Negative    Ketone Negative Negative    Blood Negative Negative    Bilirubin Negative Negative    Urobilinogen 0.2 0.2 - 1.0 mg/dL    Nitrites Negative Negative    Microscopic Examination See additional order    MICROSCOPIC EXAMINATION   Result Value Ref Range    WBC 0-5 0 - 5 /hpf    RBC 0-2 0 - 2 /hpf    Epithelial cells 0-10 0 - 10 /hpf    Casts None seen None seen /lpf    Mucus Present Not Estab.     Bacteria Few None seen/Few   LIPID PANEL   Result Value Ref Range    Cholesterol, total 197 100 - 199 mg/dL    Triglyceride 73 0 - 149 mg/dL    HDL Cholesterol 62 >39 mg/dL    VLDL Cholesterol Elroy 13 5 - 40 mg/dL    LDL Chol Calc (NIH) 122 (H) 0 - 99 mg/dL   HEMOGLOBIN A1C WITH EAG   Result Value Ref Range    Hemoglobin A1c 5.3 4.8 - 5.6 %    Estimated average glucose 105 mg/dL   VITAMIN D, 25 HYDROXY   Result Value Ref Range    VITAMIN D, 25-HYDROXY 25.2 (L) 30.0 - 100.0 ng/mL   HEPATITIS C AB   Result Value Ref Range Hep C Virus Ab <0.1 0.0 - 0.9 s/co ratio   CVD REPORT   Result Value Ref Range    INTERPRETATION Note    SPECIMEN STATUS REPORT   Result Value Ref Range    SPECIMEN STATUS REPORT COMMENT      fbs elev so still prediabetic, a1c still ok  Chol unchanged  Diet, exercise wt loss      Vit d low - is she taking vit d supp?

## 2020-10-16 RX ORDER — ERGOCALCIFEROL 1.25 MG/1
50000 CAPSULE ORAL
Qty: 13 CAP | Refills: 3 | Status: SHIPPED | OUTPATIENT
Start: 2020-10-16 | End: 2021-04-16 | Stop reason: SDUPTHER

## 2021-01-26 ENCOUNTER — TELEPHONE (OUTPATIENT)
Dept: INTERNAL MEDICINE CLINIC | Age: 66
End: 2021-01-26

## 2021-04-08 ENCOUNTER — TELEPHONE (OUTPATIENT)
Dept: INTERNAL MEDICINE CLINIC | Age: 66
End: 2021-04-08

## 2021-04-08 DIAGNOSIS — R73.01 IFG (IMPAIRED FASTING GLUCOSE): ICD-10-CM

## 2021-04-08 DIAGNOSIS — E78.5 HYPERLIPIDEMIA, UNSPECIFIED HYPERLIPIDEMIA TYPE: ICD-10-CM

## 2021-04-08 DIAGNOSIS — E55.9 HYPOVITAMINOSIS D: Primary | ICD-10-CM

## 2021-04-08 NOTE — TELEPHONE ENCOUNTER
Pt wants lab orders put in for appt next week. She will go to [facility]. She will call with fax number when she gets there.

## 2021-04-09 NOTE — PROGRESS NOTES
76 y.o. BLACK/ female who presents for evaluation    Ports no cardiovascular complaints. She is trying to be active but having a lot of knee problems. Bp has been better controlled of late    She is interested in seeing orthopedics for the bilateral knee pain that she is having. Previously saw Dr. Danyelle Talamantes then Dr. Kelsey Lamb, does not want to go back to them. Also having problems with the left shoulder. Also reports some latissimus spasms on the right primarily. Lastly, she is also complaining of left thumb pain. No GI or  complaints. She had previously declined statin therapy despite long discussion regarding risks and benefits.     She is requesting referral for colonoscopy and mammogram, she was apparently sent reminders    LAST MEDICARE WELLNESS EXAM: never as not medicare b    Past Medical History:   Diagnosis Date    Asthma     Dr. Alexandria Contreras Chronic obstructive pulmonary disease ([facility] [address]     and right lung nodules, stable on serial CT last 6/14    Colon adenoma     2009 Dr. Wilfredo Muhammad; adenoma and diverticulosis 9/12 Dr. Corinne Brought; 12/26/17    FHx: breast cancer     Glaucoma     Dr Eliceo Biswas    Hearing loss     Hyperlipidemia 05/14/2018    calculated 10 year risk score 8.3% (3/20)    Hypovitaminosis D 2011    IFG (impaired fasting glucose) 03/2014    Lung cancer ([facility] [address] 10/2006    s/p RIGHT lobectomy Dr Rubén Bryan obesity ([facility] [address]     IF 2/19 start weight 219 lbs    MIMI (obstructive sleep apnea)     on CPAP, Dr. Payam Quiñonez 2010    PUD (peptic ulcer disease)     h pylori tx'ed    Pulmonary nodule 06/2014    4 nodules <4mm; stable 5/18    Venous insufficiency      Past Surgical History:   Procedure Laterality Date    CARDIAC SURG PROCEDURE UNLIST      thallium negative (11/07); stress echo neg (9/16)    CARDIAC SURG PROCEDURE UNLIST  12/2017    echo showed nl lv, ef 65%, gr 1 dd; Dr Lenard Agee W CONT  11/07    negative    DUPLEX LOWER EXT VEIN BILAT W MANEUVERS  6/11 negative    EGD      PUD on EGD, H pylori +txed .  Saundra Romo [facility]      Dr. Jairo Montana 2009 adenoma; Dr Cristo Decker   adenoma and tics; 17 sessile serrated and tubular adenomas and tics    HX HEENT      thyroid US negative     HX HYSTERECTOMY      for fibroids    HX LOBECTOMY      for cancer, Dr. Isreal Craig 10/06. No adjuvant tx. She has another spot on the same side that is being followed every three months.  HX ORTHOPAEDIC      LEFT foot ([place]) and LEFT knee surgery x2 Dr. Ignacio Mckenzie.   RIGHT foot fx  Dr Anthony Stacks History     Socioeconomic History    Marital status: SINGLE     Spouse name: Not on file    Number of children: 0    Years of education: Not on file    Highest education level: Not on file   Occupational History    Occupation: [employer]   Social Needs    Financial resource strain: Not on file    Food insecurity     Worry: Not on file     Inability: Not on file   [employer] needs     Medical: Not on file     Non-medical: Not on file   Tobacco Use    Smoking status: Former Smoker     Packs/day: 0.50     Years: 20.00     Pack years: 10.00     Types: Cigarettes     Quit date: 1990     Years since quittin.3    Smokeless tobacco: Never Used   Substance and Sexual Activity    Alcohol use: No    Drug use: No    Sexual activity: Not Currently   Lifestyle    Physical activity     Days per week: Not on file     Minutes per session: Not on file    Stress: Not on file   Relationships    Social connections     Talks on phone: Not on file     Gets together: Not on file     Attends Islam service: Not on file     Active member of club or organization: Not on file     Attends meetings of clubs or organizations: Not on file     Relationship status: Not on file    Intimate partner violence     Fear of current or ex partner: Not on file     Emotionally abused: Not on file Physically abused: Not on file     Forced sexual activity: Not on file   Other Topics Concern    Not on file   Social History Narrative    Not on file     Family History   Problem Relation Age of Onset    Breast Cancer Mother     Other Brother         SLE    Other Brother         SLE    Other Brother         SLE    Hypertension Father      Immunization History   Administered Date(s) Administered    TD Vaccine 01/01/1990     Current Outpatient Medications   Medication Sig    ergocalciferol (Vitamin D2) 1,250 mcg (50,000 unit) capsule Take 1 Cap by mouth every seven (7) days.  cyclobenzaprine (FLEXERIL) 10 mg tablet Take 1 Tab by mouth three (3) times daily as needed for Muscle Spasm(s).  magnesium oxide (MAG-OX) 400 mg tablet Take 400 mg by mouth daily.  ascorbic acid (VITAMIN C) 250 mg tablet Take 250 mg by mouth daily.  beclomethasone (QVAR) 80 mcg/actuation inhaler Take 1 Puff by inhalation two (2) times a day.  albuterol (PROVENTIL HFA, VENTOLIN HFA) 90 mcg/actuation inhaler Take 2 Puffs by inhalation every four (4) hours as needed.  multivitamin (ONE A DAY) tablet Take 1 Tab by mouth daily.  econazole nitrate (SPECTAZOLE) 1 % topical cream Apply  to affected area two (2) times a day.  fluconazole (DIFLUCAN) 150 mg tablet Treat once weekly for 2 weeks     No current facility-administered medications for this visit.       Allergies   Allergen Reactions    Naproxen Unable to Obtain and Other (comments)    Penicillins Rash, Hives and Swelling    Advair Diskus [Fluticasone Propion-Salmeterol] Other (comments)     Hoarseness    Nsaids (Non-Steroidal Anti-Inflammatory Drug) Other (comments)     H/o PUD     REVIEW OF SYSTEMS: mammo 11/17, hyst, colo Dr Herrera Hawthorne 9/12  Ophtho  no vision change or eye pain  Oral  no mouth pain, tongue or tooth problems  Ears  no hearing loss, ear pain, fullness, no swallowing problems  Cardiac  no CP, PND, orthopnea, palpitations or syncope  Chest  no breast masses  Resp  no wheezing, chronic coughing, dyspnea  GI  no heartburn, nausea, vomiting, change in bowel habits, bleeding, hemorrhoids  Urinary  no dysuria, hematuria, flank pain, urgency, frequency  Genitals  no genital lesions, discharge, masses, ulceration, warts    Visit Vitals  /78   Pulse 82   Temp 97.9 °F (36.6 °C) (Temporal)   Resp 14   Ht 5' 3\" (1.6 m)   Wt 214 lb (97.1 kg)   SpO2 97%   BMI 37.91 kg/m²     A&O x3  Affect is appropriate. Mood stable  No apparent distress  Anicteric, no JVD, adenopathy or thyromegaly. No carotid bruits or radiated murmur  Lungs clear to auscultation, no wheezes or rales  Heart showed regular rate and rhythm. No murmur, rubs, gallops  Abdomen soft nontender, no hepatosplenomegaly or masses. Extremities without edema. Pulses 1-2+ symmetrically.       LABS  From 8/12 showed   gluc 96,   cr 0.63, gfr 115, alt 10,           chol 197, tg 77, hdl 82, ldl-c 100, wbc 5.4, hb 12.6, plt 242, tsh 0.81, ua neg  From 3/14 showed   gluc 105, cr 0.58, gfr 101,     hba1c 5.8  From 6/15 showed   gluc 97,   cr 0.73, gfr>60,  alt<5,  hba1c 5.6, chol 192, tg 56, hdl 73, ldl-c 108, wbc 5.4, hb 12.3, plt 249, tsh 0.96, vit d 12.7, hiv neg  From 11/15 showed                            ddimer neg  From 5/16 showed   gluc 99,   cr 0.62, gfr 112,                     wbc 5.9, hb 12.5, plt 258, tsh 1.04, ua neg,     proBNP 29  From 10/17 showed gluc 100, cr 0.71, gfr 91,   alt 16, hba1c 5.2, chol 212, tg 53, hdl 92, ldl-c 109, wbc 5.4, hb 12.9, plt 253,             vit d 12.8  From 4/18 showed   gluc 100, cr 0.57, gfr 99,                     wbc 6.5, hb 11.6, plt 274, uric 6.0,  esr 19  From 8/18 showed   gluc 84,   cr 0.50, gfr 103, alt 6,                              vit d 19.0  From 2/19 showed   gluc 109, cr 0.73, gfr 87,            chol 199, tg 66, hdl 84, ldl-c 102,                vit d 17.4  From 8/19 showed   gluc 110, cr 0.63, gfr>60, vit d 27.2  From 3/20 showed                chol 200, tg 65, hdl 70, ldl-c 117, wbc 5.9, hb 11.9, plt 263,             vit d 19.0  From 9/20 showed   gluc 114, cr 0.85, gfr>60,  alt 15, hba1c 5.3, chol 197, tg 79, hdl 62, ldl-c 122,                vit d 25.2, hep c neg    Results for orders placed or performed in visit on 04/08/21   CBC/DIFF AMBIGUOUS DEFAULT   Result Value Ref Range    WBC 5.6 3.4 - 10.8 x10E3/uL    RBC 3.97 3.77 - 5.28 x10E6/uL    HGB 12.2 11.1 - 15.9 g/dL    HCT 36.7 34.0 - 46.6 %    MCV 92 79 - 97 fL    MCH 30.7 26.6 - 33.0 pg    MCHC 33.2 31.5 - 35.7 g/dL    RDW 11.7 11.7 - 15.4 %    PLATELET 736 832 - 978 x10E3/uL    NEUTROPHILS 44 Not Estab. %    Lymphocytes 44 Not Estab. %    MONOCYTES 8 Not Estab. %    EOSINOPHILS 3 Not Estab. %    BASOPHILS 1 Not Estab. %    ABS. NEUTROPHILS 2.5 1.4 - 7.0 x10E3/uL    Abs Lymphocytes 2.4 0.7 - 3.1 x10E3/uL    ABS. MONOCYTES 0.4 0.1 - 0.9 x10E3/uL    ABS. EOSINOPHILS 0.2 0.0 - 0.4 x10E3/uL    ABS. BASOPHILS 0.0 0.0 - 0.2 x10E3/uL    IMMATURE GRANULOCYTES 0 Not Estab. %    ABS. IMM. GRANS. 0.0 0.0 - 0.1 C56H0/JJ   METABOLIC PANEL, COMPREHENSIVE   Result Value Ref Range    Glucose 97 65 - 99 mg/dL    BUN 9 8 - 27 mg/dL    Creatinine 0.59 0.57 - 1.00 mg/dL    GFR est non-AA 96 >59 mL/min/1.73    GFR est  >59 mL/min/1.73    BUN/Creatinine ratio 15 12 - 28    Sodium 141 134 - 144 mmol/L    Potassium 4.4 3.5 - 5.2 mmol/L    Chloride 104 96 - 106 mmol/L    CO2 26 20 - 29 mmol/L    Calcium 9.1 8.7 - 10.3 mg/dL    Protein, total 7.1 6.0 - 8.5 g/dL    Albumin 4.1 3.8 - 4.8 g/dL    GLOBULIN, TOTAL 3.0 1.5 - 4.5 g/dL    A-G Ratio 1.4 1.2 - 2.2    Bilirubin, total 0.5 0.0 - 1.2 mg/dL    Alk.  phosphatase 109 39 - 117 IU/L    AST (SGOT) 13 0 - 40 IU/L    ALT (SGPT) 11 0 - 32 IU/L   LIPID PANEL   Result Value Ref Range    Cholesterol, total 204 (H) 100 - 199 mg/dL    Triglyceride 64 0 - 149 mg/dL    HDL Cholesterol 74 >39 mg/dL    VLDL, calculated 12 5 - 40 mg/dL LDL, calculated 118 (H) 0 - 99 mg/dL   HEMOGLOBIN A1C W/O EAG   Result Value Ref Range    Hemoglobin A1c 5.2 4.8 - 5.6 %   VITAMIN D, 25 HYDROXY   Result Value Ref Range    VITAMIN D, 25-HYDROXY 31.6 30.0 - 100.0 ng/mL   CVD REPORT   Result Value Ref Range    INTERPRETATION Note    SPECIMEN STATUS REPORT   Result Value Ref Range    SPECIMEN STATUS REPORT COMMENT      We reviewed the patient's labs from the last several visits to point out trends in the numbers        Patient Active Problem List   Diagnosis Code    Colon adenoma and diverticulosis Dr. Mariposa Joyner 2012 D12.6    MIMI (obstructive sleep apnea) G47.33    Venous insufficiency I87.2    Hypovitaminosis D E55.9    Hx of cancer of lung 2006 s/p resection Z85.118    IFG (impaired fasting glucose) R73.01    COPD (chronic obstructive pulmonary disease) (HCC) J44.9    Hyperlipidemia E78.5    Severe obesity (BMI 35.0-39. 9) with comorbidity ([address] E66.01     Assessment and plan:  1. Morbid obesity. Lifestyle and dietary measures. Portion control reiterated. 2. Colon adenoma and diverticulosis. Fiber, colo referral sent  3. MIMI on cpap. F/U Dr. Aleksey Escobedo  4. Hypovit d. Continue supplementation  5. H/O lung ca and lung nodules. F/U Dr Mary Paagn   6. IFG. Wt loss would be ideal.  Lifestyle and dietary measures as above  7. Edema. Prn dyazide  8. HLP. Again declined statin therapy despite another long discussion  9. Screening mammo reordered  10. Muscle spasms. Flexeril given  11. Orthopedics. We will send to Dr. Priyanka Portillo for the knee and shoulder. X-rays of the left thumb to be done      RT 10/21      Instructions above were handwritten on the lab results sheet that I gave the patient today    Above conditions discussed at length and patient vocalized understanding.   All questions answered to patient satisfaction

## 2021-04-10 LAB
25(OH)D3+25(OH)D2 SERPL-MCNC: 31.6 NG/ML (ref 30–100)
ALBUMIN SERPL-MCNC: 4.1 G/DL (ref 3.8–4.8)
ALBUMIN/GLOB SERPL: 1.4 {RATIO} (ref 1.2–2.2)
ALP SERPL-CCNC: 109 IU/L (ref 39–117)
ALT SERPL-CCNC: 11 IU/L (ref 0–32)
AST SERPL-CCNC: 13 IU/L (ref 0–40)
BASOPHILS # BLD AUTO: 0 X10E3/UL (ref 0–0.2)
BASOPHILS NFR BLD AUTO: 1 %
BILIRUB SERPL-MCNC: 0.5 MG/DL (ref 0–1.2)
BUN SERPL-MCNC: 9 MG/DL (ref 8–27)
BUN/CREAT SERPL: 15 (ref 12–28)
CALCIUM SERPL-MCNC: 9.1 MG/DL (ref 8.7–10.3)
CHLORIDE SERPL-SCNC: 104 MMOL/L (ref 96–106)
CHOLEST SERPL-MCNC: 204 MG/DL (ref 100–199)
CO2 SERPL-SCNC: 26 MMOL/L (ref 20–29)
CREAT SERPL-MCNC: 0.59 MG/DL (ref 0.57–1)
EOSINOPHIL # BLD AUTO: 0.2 X10E3/UL (ref 0–0.4)
EOSINOPHIL NFR BLD AUTO: 3 %
ERYTHROCYTE [DISTWIDTH] IN BLOOD BY AUTOMATED COUNT: 11.7 % (ref 11.7–15.4)
GLOBULIN SER CALC-MCNC: 3 G/DL (ref 1.5–4.5)
GLUCOSE SERPL-MCNC: 97 MG/DL (ref 65–99)
HBA1C MFR BLD: 5.2 % (ref 4.8–5.6)
HCT VFR BLD AUTO: 36.7 % (ref 34–46.6)
HDLC SERPL-MCNC: 74 MG/DL
HGB BLD-MCNC: 12.2 G/DL (ref 11.1–15.9)
IMM GRANULOCYTES # BLD AUTO: 0 X10E3/UL (ref 0–0.1)
IMM GRANULOCYTES NFR BLD AUTO: 0 %
IMP & REVIEW OF LAB RESULTS: NORMAL
LDLC SERPL CALC-MCNC: 118 MG/DL (ref 0–99)
LYMPHOCYTES # BLD AUTO: 2.4 X10E3/UL (ref 0.7–3.1)
LYMPHOCYTES NFR BLD AUTO: 44 %
MCH RBC QN AUTO: 30.7 PG (ref 26.6–33)
MCHC RBC AUTO-ENTMCNC: 33.2 G/DL (ref 31.5–35.7)
MCV RBC AUTO: 92 FL (ref 79–97)
MONOCYTES # BLD AUTO: 0.4 X10E3/UL (ref 0.1–0.9)
MONOCYTES NFR BLD AUTO: 8 %
NEUTROPHILS # BLD AUTO: 2.5 X10E3/UL (ref 1.4–7)
NEUTROPHILS NFR BLD AUTO: 44 %
PLATELET # BLD AUTO: 244 X10E3/UL (ref 150–450)
POTASSIUM SERPL-SCNC: 4.4 MMOL/L (ref 3.5–5.2)
PROT SERPL-MCNC: 7.1 G/DL (ref 6–8.5)
RBC # BLD AUTO: 3.97 X10E6/UL (ref 3.77–5.28)
SODIUM SERPL-SCNC: 141 MMOL/L (ref 134–144)
SPECIMEN STATUS REPORT, ROLRST: NORMAL
TRIGL SERPL-MCNC: 64 MG/DL (ref 0–149)
VLDLC SERPL CALC-MCNC: 12 MG/DL (ref 5–40)
WBC # BLD AUTO: 5.6 X10E3/UL (ref 3.4–10.8)

## 2021-04-16 ENCOUNTER — HOSPITAL ENCOUNTER (OUTPATIENT)
Dept: GENERAL RADIOLOGY | Age: 66
Discharge: HOME | End: 2021-04-16
Payer: COMMERCIAL

## 2021-04-16 ENCOUNTER — TELEPHONE (OUTPATIENT)
Dept: INTERNAL MEDICINE CLINIC | Age: 66
End: 2021-04-16

## 2021-04-16 ENCOUNTER — OFFICE VISIT (OUTPATIENT)
Dept: INTERNAL MEDICINE CLINIC | Age: 66
End: 2021-04-16
Payer: COMMERCIAL

## 2021-04-16 VITALS
RESPIRATION RATE: 14 BRPM | HEART RATE: 82 BPM | DIASTOLIC BLOOD PRESSURE: 78 MMHG | TEMPERATURE: 97.9 F | OXYGEN SATURATION: 97 % | SYSTOLIC BLOOD PRESSURE: 120 MMHG | WEIGHT: 214 LBS | BODY MASS INDEX: 37.92 KG/M2 | HEIGHT: 63 IN

## 2021-04-16 DIAGNOSIS — E66.01 SEVERE OBESITY (BMI 35.0-39.9) WITH COMORBIDITY (HCC): ICD-10-CM

## 2021-04-16 DIAGNOSIS — M25.561 BILATERAL CHRONIC KNEE PAIN: ICD-10-CM

## 2021-04-16 DIAGNOSIS — Z12.31 SCREENING MAMMOGRAM FOR HIGH-RISK PATIENT: ICD-10-CM

## 2021-04-16 DIAGNOSIS — G89.29 CHRONIC LEFT SHOULDER PAIN: ICD-10-CM

## 2021-04-16 DIAGNOSIS — E78.5 HYPERLIPIDEMIA, UNSPECIFIED HYPERLIPIDEMIA TYPE: ICD-10-CM

## 2021-04-16 DIAGNOSIS — M79.645 THUMB PAIN, LEFT: ICD-10-CM

## 2021-04-16 DIAGNOSIS — M62.838 MUSCLE SPASM: ICD-10-CM

## 2021-04-16 DIAGNOSIS — G89.29 BILATERAL CHRONIC KNEE PAIN: ICD-10-CM

## 2021-04-16 DIAGNOSIS — M25.512 CHRONIC LEFT SHOULDER PAIN: ICD-10-CM

## 2021-04-16 DIAGNOSIS — J44.9 CHRONIC OBSTRUCTIVE PULMONARY DISEASE, UNSPECIFIED COPD TYPE (HCC): ICD-10-CM

## 2021-04-16 DIAGNOSIS — D12.6 COLON ADENOMA: ICD-10-CM

## 2021-04-16 DIAGNOSIS — M79.645 THUMB PAIN, LEFT: Primary | ICD-10-CM

## 2021-04-16 DIAGNOSIS — E55.9 HYPOVITAMINOSIS D: ICD-10-CM

## 2021-04-16 DIAGNOSIS — M25.562 BILATERAL CHRONIC KNEE PAIN: ICD-10-CM

## 2021-04-16 PROCEDURE — G8417 CALC BMI ABV UP PARAM F/U: HCPCS | Performed by: INTERNAL MEDICINE

## 2021-04-16 PROCEDURE — G8510 SCR DEP NEG, NO PLAN REQD: HCPCS | Performed by: INTERNAL MEDICINE

## 2021-04-16 PROCEDURE — G8427 DOCREV CUR MEDS BY ELIG CLIN: HCPCS | Performed by: INTERNAL MEDICINE

## 2021-04-16 PROCEDURE — G9899 SCRN MAM PERF RSLTS DOC: HCPCS | Performed by: INTERNAL MEDICINE

## 2021-04-16 PROCEDURE — 3017F COLORECTAL CA SCREEN DOC REV: CPT | Performed by: INTERNAL MEDICINE

## 2021-04-16 PROCEDURE — G8536 NO DOC ELDER MAL SCRN: HCPCS | Performed by: INTERNAL MEDICINE

## 2021-04-16 PROCEDURE — 1101F PT FALLS ASSESS-DOCD LE1/YR: CPT | Performed by: INTERNAL MEDICINE

## 2021-04-16 PROCEDURE — G8400 PT W/DXA NO RESULTS DOC: HCPCS | Performed by: INTERNAL MEDICINE

## 2021-04-16 PROCEDURE — 99214 OFFICE O/P EST MOD 30 MIN: CPT | Performed by: INTERNAL MEDICINE

## 2021-04-16 PROCEDURE — 1090F PRES/ABSN URINE INCON ASSESS: CPT | Performed by: INTERNAL MEDICINE

## 2021-04-16 PROCEDURE — 73140 X-RAY EXAM OF FINGER(S): CPT

## 2021-04-16 RX ORDER — CYCLOBENZAPRINE HCL 10 MG
10 TABLET ORAL
Qty: 30 TAB | Refills: 1 | Status: SHIPPED | OUTPATIENT
Start: 2021-04-16 | End: 2021-11-04

## 2021-04-16 RX ORDER — ERGOCALCIFEROL 1.25 MG/1
50000 CAPSULE ORAL
Qty: 13 CAP | Refills: 3 | Status: SHIPPED | OUTPATIENT
Start: 2021-04-16

## 2021-04-20 ENCOUNTER — HOSPITAL ENCOUNTER (OUTPATIENT)
Dept: MAMMOGRAPHY | Age: 66
Discharge: HOME | End: 2021-04-20
Attending: INTERNAL MEDICINE
Payer: COMMERCIAL

## 2021-04-20 DIAGNOSIS — Z12.31 SCREENING MAMMOGRAM FOR HIGH-RISK PATIENT: ICD-10-CM

## 2021-04-20 PROCEDURE — 77063 BREAST TOMOSYNTHESIS BI: CPT

## 2021-04-22 ENCOUNTER — TELEPHONE (OUTPATIENT)
Dept: INTERNAL MEDICINE CLINIC | Age: 66
End: 2021-04-22

## 2021-04-22 DIAGNOSIS — M18.10 PRIMARY OSTEOARTHRITIS OF FIRST CARPOMETACARPAL JOINT, UNSPECIFIED LATERALITY: Primary | ICD-10-CM

## 2021-04-22 NOTE — TELEPHONE ENCOUNTER
pls call    IMPRESSION  Findings/impression:     No acute fracture. No radiopaque foreign body. Mild degenerative changes noted  at the first MCP joint. Moderate/severe degenerative changes with a bone-on-bone  appearance noted at the thumb base. Moderate/severe degenerative changes noted  at the first interphalangeal joint. Degenerative findings most consistent with  Osteoarthritis.     Suggestt referral to [facility] if she's amenable

## 2021-08-19 ENCOUNTER — HOSPITAL ENCOUNTER (EMERGENCY)
Age: 66
Discharge: HOME | End: 2021-08-19
Attending: EMERGENCY MEDICINE
Payer: COMMERCIAL

## 2021-08-19 ENCOUNTER — APPOINTMENT (OUTPATIENT)
Dept: GENERAL RADIOLOGY | Age: 66
End: 2021-08-19
Attending: EMERGENCY MEDICINE
Payer: COMMERCIAL

## 2021-08-19 VITALS
HEART RATE: 69 BPM | HEIGHT: 63 IN | TEMPERATURE: 98.6 F | DIASTOLIC BLOOD PRESSURE: 73 MMHG | RESPIRATION RATE: 18 BRPM | BODY MASS INDEX: 37.93 KG/M2 | SYSTOLIC BLOOD PRESSURE: 149 MMHG | WEIGHT: 214.07 LBS | OXYGEN SATURATION: 97 %

## 2021-08-19 DIAGNOSIS — S89.92XA INJURY OF LEFT KNEE, INITIAL ENCOUNTER: Primary | ICD-10-CM
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
## 2021-08-19 PROCEDURE — 99282 EMERGENCY DEPT VISIT SF MDM: CPT

## 2021-08-19 PROCEDURE — 73564 X-RAY EXAM KNEE 4 OR MORE: CPT

## 2021-08-19 RX ORDER — OXYCODONE AND ACETAMINOPHEN 5; 325 MG/1; MG/1
1 TABLET ORAL
Qty: 6 TABLET | Refills: 0 | Status: SHIPPED | OUTPATIENT
Start: 2021-08-19 | End: 2021-08-21

## 2021-08-19 NOTE — ED NOTES
Pt discharged at this time. This RN reviewed discharge instructions at this time with the pt, & pt does not have any questions. Pt ambulatory upon discharge, & in stable condition. Pt armband removed & shredded.
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
## 2021-08-20 ENCOUNTER — OFFICE VISIT (OUTPATIENT)
Dept: INTERNAL MEDICINE CLINIC | Age: 66
End: 2021-08-20

## 2021-08-20 VITALS
DIASTOLIC BLOOD PRESSURE: 77 MMHG | TEMPERATURE: 96.8 F | SYSTOLIC BLOOD PRESSURE: 138 MMHG | BODY MASS INDEX: 37.32 KG/M2 | WEIGHT: 210.6 LBS | OXYGEN SATURATION: 98 % | RESPIRATION RATE: 16 BRPM | HEART RATE: 82 BPM | HEIGHT: 63 IN

## 2021-08-20 DIAGNOSIS — M23.92 ACUTE INTERNAL DERANGEMENT OF LEFT KNEE: Primary | ICD-10-CM

## 2021-08-20 RX ORDER — PREDNISONE 5 MG/1
TABLET ORAL SEE ADMIN INSTRUCTIONS
COMMUNITY
End: 2021-11-04
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
## 2021-08-20 NOTE — PROGRESS NOTES
Depression Screening:  3 most recent PHQ Screens 8/20/2021   Little interest or pleasure in doing things Not at all   Feeling down, depressed, irritable, or hopeless Not at all   Total Score PHQ 2 0       Learning Assessment:  Learning Assessment 2/19/2019   PRIMARY LEARNER Patient   HIGHEST LEVEL OF EDUCATION - PRIMARY LEARNER  GRADUATED HIGH SCHOOL OR GED   BARRIERS PRIMARY LEARNER NONE   CO-LEARNER CAREGIVER No   PRIMARY LANGUAGE ENGLISH   LEARNER PREFERENCE PRIMARY DEMONSTRATION   ANSWERED BY patient   RELATIONSHIP SELF       Abuse Screening:  Abuse Screening Questionnaire 4/16/2021   Do you ever feel afraid of your partner? N   Are you in a relationship with someone who physically or mentally threatens you? N   Is it safe for you to go home? Y       Fall Risk  Fall Risk Assessment, last 12 mths 8/20/2021   Able to walk? Yes   Fall in past 12 months? 0   Do you feel unsteady? -   Are you worried about falling -   Is TUG test greater than 12 seconds? -   Is the gait abnormal? -   Number of falls in past 12 months -   Fall with injury? -       ADL  ADL Assessment 6/16/2015   Feeding yourself No Help Needed   Getting from bed to chair No Help Needed   Getting dressed No Help Needed   Bathing or showering No Help Needed   Walk across the room (includes cane/walker) No Help Needed   Using the telphone No Help Needed   Taking your medications No Help Needed   Preparing meals No Help Needed   Managing money (expenses/bills) No Help Needed   Moderately strenuous housework (laundry) No Help Needed   Shopping for personal items (toiletries/medicines) No Help Needed   Shopping for groceries No Help Needed   Driving No Help Needed   Climbing a flight of stairs No Help Needed   Getting to places beyond walking distances No Help Needed       Travel Screening:    Travel Screening     Question   Response    In the last month, have you been in contact with someone who was confirmed or suspected to have Coronavirus / COVID-19? No / Unsure    Have you had a COVID-19 viral test in the last 14 days? No    Do you have any of the following new or worsening symptoms? None of these    Have you traveled internationally or domestically in the last month? No      Travel History   Travel since 07/20/21     No documented travel since 07/20/21          Health Maintenance reviewed and discussed and ordered per Provider. Health Maintenance Due   Topic Date Due    COVID-19 Vaccine (1) Never done    Shingrix Vaccine Age 50> (1 of 2) Never done    Bone Densitometry (Dexa) Screening  Never done   . Eliazar Matias presents today for   Chief Complaint   Patient presents with   [address] [facility] Follow Up       Is someone accompanying this pt? no    Is the patient using any DME equipment during OV? no    Coordination of Care:  1. Have you been to the ER, urgent care clinic since your last visit? Hospitalized since your last visit? no    2. Have you seen or consulted any other health care providers outside of the [address] since your last visit? Include any pap smears or colon screening.  no

## 2021-08-31 ENCOUNTER — OFFICE VISIT (OUTPATIENT)
Dept: ORTHOPEDIC SURGERY | Age: 66
End: 2021-08-31

## 2021-08-31 VITALS
WEIGHT: 212 LBS | BODY MASS INDEX: 37.56 KG/M2 | HEART RATE: 81 BPM | HEIGHT: 63 IN | TEMPERATURE: 96.8 F | OXYGEN SATURATION: 98 %

## 2021-08-31 DIAGNOSIS — M25.562 ACUTE PAIN OF LEFT KNEE: ICD-10-CM

## 2021-08-31 DIAGNOSIS — S83.92XA SPRAIN OF LEFT KNEE, UNSPECIFIED LIGAMENT, INITIAL ENCOUNTER: ICD-10-CM

## 2021-08-31 DIAGNOSIS — M17.0 PRIMARY OSTEOARTHRITIS OF BOTH KNEES: Primary | ICD-10-CM

## 2021-08-31 PROCEDURE — 99203 OFFICE O/P NEW LOW 30 MIN: CPT | Performed by: ORTHOPAEDIC SURGERY

## 2021-08-31 PROCEDURE — 73560 X-RAY EXAM OF KNEE 1 OR 2: CPT | Performed by: ORTHOPAEDIC SURGERY

## 2021-08-31 RX ORDER — MELOXICAM 15 MG/1
15 TABLET ORAL DAILY
Qty: 30 TABLET | Refills: 1 | Status: SHIPPED | OUTPATIENT
Start: 2021-08-31 | End: 2021-10-04
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
## 2021-10-04 DIAGNOSIS — M17.0 PRIMARY OSTEOARTHRITIS OF BOTH KNEES: ICD-10-CM

## 2021-10-04 DIAGNOSIS — S83.92XA SPRAIN OF LEFT KNEE, UNSPECIFIED LIGAMENT, INITIAL ENCOUNTER: ICD-10-CM

## 2021-10-04 RX ORDER — MELOXICAM 15 MG/1
15 TABLET ORAL DAILY
Qty: 30 TABLET | Refills: 1 | Status: SHIPPED | OUTPATIENT
Start: 2021-10-04 | End: 2022-02-28

## 2021-10-18 ENCOUNTER — TELEPHONE (OUTPATIENT)
Dept: INTERNAL MEDICINE CLINIC | Age: 66
End: 2021-10-18

## 2021-10-18 DIAGNOSIS — E78.5 HYPERLIPIDEMIA, UNSPECIFIED HYPERLIPIDEMIA TYPE: ICD-10-CM

## 2021-10-18 DIAGNOSIS — E55.9 HYPOVITAMINOSIS D: Primary | ICD-10-CM

## 2021-10-18 DIAGNOSIS — R73.01 IFG (IMPAIRED FASTING GLUCOSE): ICD-10-CM

## 2021-10-18 NOTE — TELEPHONE ENCOUNTER
Please verify what are the current lab orders for pt. Is it just the lipid panel ordered 04/16/21? She is going to [facility] sometime this week and asking me to fax the orders.      Please advise    [facility] ph [phone]  Fax [phone]

## 2021-10-21 DIAGNOSIS — E78.5 HYPERLIPIDEMIA, UNSPECIFIED HYPERLIPIDEMIA TYPE: ICD-10-CM

## 2021-10-21 DIAGNOSIS — R73.01 IFG (IMPAIRED FASTING GLUCOSE): ICD-10-CM

## 2021-10-21 DIAGNOSIS — E55.9 HYPOVITAMINOSIS D: ICD-10-CM

## 2021-10-22 LAB
25(OH)D3+25(OH)D2 SERPL-MCNC: 29.7 NG/ML (ref 30–100)
ALBUMIN SERPL-MCNC: 4.1 G/DL (ref 3.8–4.8)
ALBUMIN/GLOB SERPL: 1.3 {RATIO} (ref 1.2–2.2)
ALP SERPL-CCNC: 117 IU/L (ref 44–121)
ALT SERPL-CCNC: 7 IU/L (ref 0–32)
AST SERPL-CCNC: 12 IU/L (ref 0–40)
BILIRUB SERPL-MCNC: 0.3 MG/DL (ref 0–1.2)
BUN SERPL-MCNC: 11 MG/DL (ref 8–27)
BUN/CREAT SERPL: 17 (ref 12–28)
CALCIUM SERPL-MCNC: 9.4 MG/DL (ref 8.7–10.3)
CHLORIDE SERPL-SCNC: 103 MMOL/L (ref 96–106)
CHOLEST SERPL-MCNC: 216 MG/DL (ref 100–199)
CO2 SERPL-SCNC: 26 MMOL/L (ref 20–29)
CREAT SERPL-MCNC: 0.63 MG/DL (ref 0.57–1)
GLOBULIN SER CALC-MCNC: 3.2 G/DL (ref 1.5–4.5)
GLUCOSE SERPL-MCNC: 114 MG/DL (ref 65–99)
HBA1C MFR BLD: 5.4 % (ref 4.8–5.6)
HDLC SERPL-MCNC: 64 MG/DL
IMP & REVIEW OF LAB RESULTS: NORMAL
LDLC SERPL CALC-MCNC: 141 MG/DL (ref 0–99)
POTASSIUM SERPL-SCNC: 4.3 MMOL/L (ref 3.5–5.2)
PROT SERPL-MCNC: 7.3 G/DL (ref 6–8.5)
SODIUM SERPL-SCNC: 141 MMOL/L (ref 134–144)
TRIGL SERPL-MCNC: 64 MG/DL (ref 0–149)
VLDLC SERPL CALC-MCNC: 11 MG/DL (ref 5–40)

## 2021-11-04 ENCOUNTER — VIRTUAL VISIT (OUTPATIENT)
Dept: INTERNAL MEDICINE CLINIC | Age: 66
End: 2021-11-04
Payer: COMMERCIAL

## 2021-11-04 DIAGNOSIS — E78.5 HYPERLIPIDEMIA, UNSPECIFIED HYPERLIPIDEMIA TYPE: ICD-10-CM

## 2021-11-04 DIAGNOSIS — J44.9 CHRONIC OBSTRUCTIVE PULMONARY DISEASE, UNSPECIFIED COPD TYPE (HCC): ICD-10-CM

## 2021-11-04 DIAGNOSIS — E66.01 SEVERE OBESITY (BMI 35.0-39.9) WITH COMORBIDITY (HCC): ICD-10-CM

## 2021-11-04 DIAGNOSIS — I87.2 VENOUS INSUFFICIENCY: ICD-10-CM

## 2021-11-04 DIAGNOSIS — Z00.00 PHYSICAL EXAM: Primary | ICD-10-CM

## 2021-11-04 DIAGNOSIS — D12.6 COLON ADENOMA: ICD-10-CM

## 2021-11-04 DIAGNOSIS — R73.01 IFG (IMPAIRED FASTING GLUCOSE): ICD-10-CM

## 2021-11-04 DIAGNOSIS — G47.33 OSA (OBSTRUCTIVE SLEEP APNEA): ICD-10-CM

## 2021-11-04 PROCEDURE — 99397 PER PM REEVAL EST PAT 65+ YR: CPT | Performed by: INTERNAL MEDICINE

## 2021-11-04 PROCEDURE — G8417 CALC BMI ABV UP PARAM F/U: HCPCS | Performed by: INTERNAL MEDICINE

## 2021-11-04 PROCEDURE — G9899 SCRN MAM PERF RSLTS DOC: HCPCS | Performed by: INTERNAL MEDICINE

## 2021-11-04 PROCEDURE — 1090F PRES/ABSN URINE INCON ASSESS: CPT | Performed by: INTERNAL MEDICINE

## 2021-11-04 PROCEDURE — 1101F PT FALLS ASSESS-DOCD LE1/YR: CPT | Performed by: INTERNAL MEDICINE

## 2021-11-04 PROCEDURE — 3017F COLORECTAL CA SCREEN DOC REV: CPT | Performed by: INTERNAL MEDICINE

## 2021-11-04 PROCEDURE — G8432 DEP SCR NOT DOC, RNG: HCPCS | Performed by: INTERNAL MEDICINE

## 2021-11-05 ENCOUNTER — TELEPHONE (OUTPATIENT)
Dept: INTERNAL MEDICINE CLINIC | Age: 66
End: 2021-11-05

## 2021-11-05 NOTE — PROGRESS NOTES
Brittny Estes is a 78 y.o. female who was seen by synchronous (real-time) audio-video technology on 11/4/2021 for No chief complaint on file. Assessment & Plan:   Diagnoses and all orders for this visit:    1. Physical exam    2. Colon adenoma and diverticulosis Dr. Marilyn Urias    3. Chronic obstructive pulmonary disease, unspecified COPD type ([facility]    4. Hyperlipidemia, unspecified hyperlipidemia type    5. IFG (impaired fasting glucose)  -     CBC W/O DIFF; Future  -     METABOLIC PANEL, BASIC; Future  -     HEMOGLOBIN A1C WITH EAG; Future    6. MIMI (obstructive sleep apnea)    7. Venous insufficiency    8. Severe obesity (BMI 35.0-39. 9) with comorbidity ([facility]        I spent at least 31 minutes on this visit with this established patient. 712  Subjective:       Objective:   No flowsheet data found. General: alert, cooperative, no distress   Mental  status: normal mood, behavior, speech, dress, motor activity, and thought processes, able to follow commands   HENT: NCAT   Neck: no visualized mass   Resp: no respiratory distress   Neuro: no gross deficits   Skin: no discoloration or lesions of concern on visible areas   Psychiatric: normal affect, consistent with stated mood, no evidence of hallucinations     Additional exam findings: We discussed the expected course, resolution and complications of the diagnosis(es) in detail. Medication risks, benefits, costs, interactions, and alternatives were discussed as indicated. I advised her to contact the office if her condition worsens, changes or fails to improve as anticipated. She expressed understanding with the diagnosis(es) and plan. Brittny Estes, was evaluated through a synchronous (real-time) audio-video encounter. The patient (or guardian if applicable) is aware that this is a billable service. Verbal consent to proceed has been obtained within the past 12 months.  The visit was conducted pursuant to the emergency declaration under the [facility], [facility] authority and the [employer] Act. Patient identification was verified, and a caregiver was present when appropriate. The patient was located in a state where the provider was credentialed to provide care. Sixto Skaggs MD      78 y.o. BLACK/ female who presents for evaluation    She has been seeing Dr Marilou Panda and Dr Neil Pandya and apparently developed right eye blindness from central retinal vein occlusion    No cardiovascular complaints. She has not been able to exercise much due to her knee issues    Ended up seeing Dr Constanza Tyler and was dxed w osteoarthritis    No GI or  complaints. She had previously declined statin therapy despite long discussion regarding risks and benefits.     She got mammo but not the colo    LAST MEDICARE WELLNESS EXAM: 11/4/21    Past Medical History:   Diagnosis Date    Asthma     Dr. Allan Bradford of right eye     Dr Neil Pandya; central retinal vein occlusion    Colon adenoma     2009 Dr. Khadra Diaz; adenoma and diverticulosis 9/12 Dr. Nii Snyder; 12/26/17    COPD (chronic obstructive pulmonary disease) ([facility]     and right lung nodules, stable on serial CT last 6/14    FHx: breast cancer     Glaucoma     Dr Marilou Panda    Hearing loss     Hyperlipidemia 05/14/2018    calculated 10 year risk score 8.3% (3/20); declined statins repeatedly    Hypovitaminosis D 2011    IFG (impaired fasting glucose) 03/2014    Lung cancer ([facility] 10/2006    s/p RIGHT lobectomy Dr Gian Matt obesity ([facility]     IF 2/19 start weight 219 lbs    MIMI (obstructive sleep apnea)     on CPAP, Dr. Marisela Meehan 2010    PUD (peptic ulcer disease)     h pylori tx'ed    Pulmonary nodule 06/2014    4 nodules <4mm; stable 5/18    Venous insufficiency      Past Surgical History:   Procedure Laterality Date    DUPLEX LOWER EXT VEIN BILAT W MANEUVERS      negative    EGD      PUD on EGD, H pylori +txed .  Pal Romero  adenoma; Dr Zehra Pitt   adenoma and tics; 17 sessile serrated and tubular adenomas and tics    HX HEENT      thyroid US negative     HX HYSTERECTOMY      for fibroids    HX LOBECTOMY      for cancer, Dr. Mckeon Epp 10/06. No adjuvant tx. She has another spot on the same side that is being followed every three months.  HX ORTHOPAEDIC      LEFT foot ([place]) and LEFT knee surgery x2 Dr. Rhys Grace. RIGHT foot fx  Dr Nola Hernández      thallium negative (); stress echo neg ()    [place] CARDIAC SURG PROCEDURE UNLIST  2017    echo showed nl lv, ef 65%, gr 1 dd; Dr Maikel White     Social History     Socioeconomic History    Marital status: SINGLE     Spouse name: Not on file    Number of children: 0    Years of education: Not on file    Highest education level: Not on file   Occupational History    Occupation: [employer]   Tobacco Use    Smoking status: Former Smoker     Packs/day: 0.50     Years: 20.00     Pack years: 10.00     Types: Cigarettes     Quit date: 1990     Years since quittin.8    Smokeless tobacco: Never Used   Substance and Sexual Activity    Alcohol use: No    Drug use: No    Sexual activity: Not Currently   Other Topics Concern    Not on file   Social History Narrative    Not on file     Social Determinants of Health     Financial Resource Strain:     Difficulty of Paying Living Expenses: Not on file   Food Insecurity:     Worried About [address] in the Last Year: Not on file    Roel of Food in the Last Year: Not on file   Transportation Needs:     Lack of Transportation (Medical): Not on file    Lack of Transportation (Non-Medical):  Not on file   Physical Activity:     Days of Exercise per Week: Not on file    Minutes of Exercise per Session: Not on file   Stress:     Feeling of Stress : Not on file   Social Connections:     Frequency of Communication with Friends and Family: Not on file    Frequency of Social Gatherings with Friends and Family: Not on file    Attends Confucianism Services: Not on file    Active Member of [address] [place] [address] or Organizations: Not on file    Attends Club or Organization Meetings: Not on file    Marital Status: Not on file   Intimate Partner Violence:     Fear of Current or Ex-Partner: Not on file    Emotionally Abused: Not on file    Physically Abused: Not on file    Sexually Abused: Not on file   Housing Stability:     Unable to Pay for Housing in the Last Year: Not on file    Number of [place] in the Last Year: Not on file    Unstable Housing in the Last Year: Not on file     Family History   Problem Relation Age of Onset    Breast Cancer Mother     Other Brother         SLE    Other Brother         SLE    Other Brother         SLE    Hypertension Father     Cancer Father      Immunization History   Administered Date(s) Administered    TD Vaccine 01/01/1990     Current Outpatient Medications   Medication Sig    meloxicam (MOBIC) 15 mg tablet TAKE 1 TABLET BY MOUTH DAILY.  ZINC PO Take  by mouth.  ergocalciferol (Vitamin D2) 1,250 mcg (50,000 unit) capsule Take 1 Cap by mouth every seven (7) days.  magnesium oxide (MAG-OX) 400 mg tablet Take 400 mg by mouth daily.  ascorbic acid (VITAMIN C) 250 mg tablet Take 250 mg by mouth daily.  beclomethasone (QVAR) 80 mcg/actuation inhaler Take 1 Puff by inhalation two (2) times a day.  albuterol (PROVENTIL HFA, VENTOLIN HFA) 90 mcg/actuation inhaler Take 2 Puffs by inhalation every four (4) hours as needed.  multivitamin (ONE A DAY) tablet Take 1 Tab by mouth daily. No current facility-administered medications for this visit.      Allergies   Allergen Reactions    Naproxen Unable to Obtain and Other (comments)    Penicillins Rash, Hives and Swelling    Advair Diskus [Fluticasone Propion-Salmeterol] Other (comments)     Hoarseness    Nsaids (Non-Steroidal Anti-Inflammatory Drug) Other (comments)     H/o PUD     REVIEW OF SYSTEMS: mammo 4/21, colo Dr Zehra Pitt 9/12  Ophtho  no vision change or eye pain  Oral  no mouth pain, tongue or tooth problems  Ears  no hearing loss, ear pain, fullness, no swallowing problems  Cardiac  no CP, PND, orthopnea, palpitations or syncope  Chest  no breast masses  Resp  no wheezing, chronic coughing, dyspnea  GI  no heartburn, nausea, vomiting, change in bowel habits, bleeding, hemorrhoids  Urinary  no dysuria, hematuria, flank pain, urgency, frequency    LABS  From 8/12 showed   gluc 96,   cr 0.63, gfr 115, alt 10,           chol 197, tg 77, hdl 82, ldl-c 100, wbc 5.4, hb 12.6, plt 242, tsh 0.81, ua neg  From 3/14 showed   gluc 105, cr 0.58, gfr 101,     hba1c 5.8  From 6/15 showed   gluc 97,   cr 0.73, gfr>60,  alt<5,  hba1c 5.6, chol 192, tg 56, hdl 73, ldl-c 108, wbc 5.4, hb 12.3, plt 249, tsh 0.96, vit d 12.7, hiv neg  From 11/15 showed                            ddimer neg  From 5/16 showed   gluc 99,   cr 0.62, gfr 112,                     wbc 5.9, hb 12.5, plt 258, tsh 1.04, ua neg,     proBNP 29  From 10/17 showed gluc 100, cr 0.71, gfr 91,   alt 16, hba1c 5.2, chol 212, tg 53, hdl 92, ldl-c 109, wbc 5.4, hb 12.9, plt 253,             vit d 12.8  From 4/18 showed   gluc 100, cr 0.57, gfr 99,                     wbc 6.5, hb 11.6, plt 274, uric 6.0,  esr 19  From 8/18 showed   gluc 84,   cr 0.50, gfr 103, alt 6,                              vit d 19.0  From 2/19 showed   gluc 109, cr 0.73, gfr 87,            chol 199, tg 66, hdl 84, ldl-c 102,                vit d 17.4  From 8/19 showed   gluc 110, cr 0.63, gfr>60,                                     vit d 27.2  From 3/20 showed                chol 200, tg 65, hdl 70, ldl-c 117, wbc 5.9, hb 11.9, plt 263, vit d 19.0  From 9/20 showed   gluc 114, cr 0.85, gfr>60,  alt 15, hba1c 5.3, chol 197, tg 79, hdl 62, ldl-c 122,                vit d 25.2, hep c-  From 4/21 showed   gluc 97,   cr 0.59, gfr 96,   alt 11, hba1c 5.2, chol 204, tg 64, hdl 74, ldl-c 118, wbc 5.6, hb 12.2, plt 244,             vit d 31.6    Results for orders placed or performed in visit on 72/02/27   METABOLIC PANEL, COMPREHENSIVE   Result Value Ref Range    Glucose 114 (H) 65 - 99 mg/dL    BUN 11 8 - 27 mg/dL    Creatinine 0.63 0.57 - 1.00 mg/dL    GFR est non-AA 93 >59 mL/min/1.73    GFR est  >59 mL/min/1.73    BUN/Creatinine ratio 17 12 - 28    Sodium 141 134 - 144 mmol/L    Potassium 4.3 3.5 - 5.2 mmol/L    Chloride 103 96 - 106 mmol/L    CO2 26 20 - 29 mmol/L    Calcium 9.4 8.7 - 10.3 mg/dL    Protein, total 7.3 6.0 - 8.5 g/dL    Albumin 4.1 3.8 - 4.8 g/dL    GLOBULIN, TOTAL 3.2 1.5 - 4.5 g/dL    A-G Ratio 1.3 1.2 - 2.2    Bilirubin, total 0.3 0.0 - 1.2 mg/dL    Alk.  phosphatase 117 44 - 121 IU/L    AST (SGOT) 12 0 - 40 IU/L    ALT (SGPT) 7 0 - 32 IU/L   LIPID PANEL   Result Value Ref Range    Cholesterol, total 216 (H) 100 - 199 mg/dL    Triglyceride 64 0 - 149 mg/dL    HDL Cholesterol 64 >39 mg/dL    VLDL, calculated 11 5 - 40 mg/dL    LDL, calculated 141 (H) 0 - 99 mg/dL   HEMOGLOBIN A1C W/O EAG   Result Value Ref Range    Hemoglobin A1c 5.4 4.8 - 5.6 %   VITAMIN D, 25 HYDROXY   Result Value Ref Range    VITAMIN D, 25-HYDROXY 29.7 (L) 30.0 - 100.0 ng/mL   CVD REPORT   Result Value Ref Range    INTERPRETATION Note      We reviewed the patient's labs from the last several visits to point out trends in the numbers        Patient Active Problem List   Diagnosis Code    Colon adenoma and diverticulosis Dr. Adama Wallis 2012 D12.6    MIMI (obstructive sleep apnea) G47.33    Venous insufficiency I87.2    Hypovitaminosis D E55.9    Hx of cancer of lung 2006 s/p resection Z85.118    IFG (impaired fasting glucose) R73.01    COPD (chronic obstructive pulmonary disease) (HCC) J44.9    Hyperlipidemia E78.5    Severe obesity (BMI 35.0-39. 9) with comorbidity ([facility] E66.01     Assessment and plan:  1. Morbid obesity. See separate note  2. Colon adenoma and diverticulosis. Fiber, colo referral sent  3. MIMI on cpap. F/U Dr. Marcelo Cho  4. Hypovit d. Continue supplementation  5. H/O lung ca and lung nodules. F/U pulmonary  6. IFG. Wt loss would be ideal.  Lifestyle and dietary measures as above  7. Edema. Prn dyazide  8. HLP. Again declined statin therapy   9. Orthopedics. Follow up Dr Amauri Vallejo  10. Ophth. Follow up Dr Zafar Johnston and Dr Kalee Navarrete  11. Declined flu, pvx, shingrix, covid      RTC 5/22      Above conditions discussed at length and patient vocalized understanding. All questions answered to patient satisfaction          ICD-10-CM ICD-9-CM    1. Physical exam  Z00.00 V70.9    2. Colon adenoma and diverticulosis Dr. Ramo Hernandes 2012  D12.6 211.3 REFERRAL TO GASTROENTEROLOGY   3. Chronic obstructive pulmonary disease, unspecified COPD type ([facility]  J44.9 496    4. Hyperlipidemia, unspecified hyperlipidemia type  E78.5 272.4    5. IFG (impaired fasting glucose)  R73.01 790.21 CBC W/O DIFF      METABOLIC PANEL, BASIC      HEMOGLOBIN A1C WITH EAG   6. MIMI (obstructive sleep apnea)  G47.33 327.23    7. Venous insufficiency  I87.2 459.81    8. Severe obesity (BMI 35.0-39. 9) with comorbidity ([facility]  E66.01 278.01

## 2021-11-10 NOTE — PROGRESS NOTES
Outpatient Cardiology Clinic Follow-Up Progress Note  [facility]  [address] [address]  Dept Phone: [phone]       Patient Name: Josephine Jenkins  MRN:[id number]  :1950      Date: 11/10/2021  PCP: Bairon Torre MD  Cardiologist: Yakelin  Nephrologist:   Discharge date:  10/15/2021  NYHA class: III  Weight at discharge: 83.8 kg (184.36 pounds)    Weight at last visit: 84 kg (184.8 pounds)  Weight today: 85.3 kg (187.66 pounds)    Referring Physician  Bairon Torre MD  Fax: [phone]    Chief Complaint   Patient presents with   • Follow-up   • Congestive Heart Failure   • Shortness of Breath         HISTORY OF PRESENT ILLNESS  Josephine Jenkins is a 70 year old female with a past medical history of HFrEF 15%, CKD, HLD, COPD, hypotension, s/p CABG , and s/p ICD. The patient moved here from [place] in July and established care with Dr. Davis and Dr. Holt. She recently saw Dr. Davis in the office and her diuretics were increased due to SOB and edema. At initial visit 2 weeks ago patient was seen after a rapid response was called in cardiac rehab due to SOB. Patient was then directly admitted for diuresis and observation. She has been following up here in the HF clinic for the last few weeks. Patient recently saw Dr. Proctor. He switched patient from coreg to toprol and adjusted losartan dose. She is scheduled for a BiV ICD with Dr. Holt in 2 weeks. Patient denies any dizziness, lightheadedness, chest pain or palpitations . She currently resides at [facility]. Patient's weight here and at home are up since last visit. Patient states her mood is down because she is under quarantine at [facility]. She said the residents cannot visit with each other and they are not allowed to have family visit. She does endorse some dyspnea and lower extremity edema.    ASSESSMENT/PLAN  1. Acute on chronic heart failure, unspecified heart failure  60 y.o. BLACK OR  female who presents for RPE    Last year, she had atypical cp and had stress echo 9/16 which was done by Dr Courtney Kaye and read as equivocal with abn tracing although no clear wall motion abnormality. She was referred to Dr Sujey Donnelly but never went. She was doing well and asymptomatic until last week when she had another episode of cp this time w radiation to the left arm while working. She was taken to the dispensary and 'ekg was ok' but she was told to f/u with us. She is now requesting eval by [facility] cardiology with any procedure to be done at [facility]    No pnd, orthopnea, she has chronic edema but currently controlled. Not using lasix at this time    She's been having hoarseness for about a week. No f, mild sore thorat, no ear pain,she does have dry cough. No wheezing, dyspnea, no known exposures    No gi or gu issues. She is due for colo Dr Marcia Vanegas and wants us to schedule. She's off ppi    Past Medical History:   Diagnosis Date    Asthma     Dr. Jeffers [facility])     Lung Cancer, f/u Dr. Talib Faulkner.  Chronic obstructive pulmonary disease (HCC)     and right lung nodules, stable on serial CT last 6/14    Colon adenoma     2009 Dr. Nigel Rojas; adenoma and diverticulosis 9/12 Dr. Babs Rhodes FHx: breast cancer     Glaucoma     Dr Anjum Paz Hypovitaminosis D 2011    IFG (impaired fasting glucose)     Morbid obesity (HCC)     peak weight 209 lbs, bmi 37 from 2/14    PUD (peptic ulcer disease)     h pylori tx'ed    Sleep apnea     on CPAP, Dr. Janeen Proctor 2010    Venous insufficiency      Past Surgical History:   Procedure Laterality Date    CARDIAC SURG PROCEDURE UNLIST  11/07    thallium negative    CT HEAD W CONT  11/07    negative    EGD      PUD on EGD, H pylori +txed 2009.  ENDOSCOPY, COLON, DIAGNOSTIC      Colon adenoma 2009, Dr. Godwin Friday.   adenoma and tics Dr. Marcia Vanegas 9/12    Jose UREÑA thyroid US negative 6/14    HX HYSTERECTOMY  1993    for fibroids    HX LOBECTOMY      for cancer, Dr. Zach Parra 10/06. No adjuvant tx. She has another spot on the same side that is being followed every three months.  HX ORTHOPAEDIC      Left foot ([place]) and knee surgery x2 Dr. Celio Beth.  US DUPLEX LOWER EXT VEIN BILAT W MANEUVERS  6/11    negative     Social History     Social History    Marital status: SINGLE     Spouse name: N/A    Number of children: 0    Years of education: N/A     Occupational History    [place]       Social History Main Topics    Smoking status: Former Smoker     Packs/day: 0.50     Years: 20.00     Types: Cigarettes     Quit date: 1/1/1990    Smokeless tobacco: Never Used    Alcohol use No    Drug use: No    Sexual activity: Not on file     Other Topics Concern    Not on file     Social History Narrative     Family History   Problem Relation Age of Onset    Breast Cancer Mother     Hypertension Father     Other Brother      SLE    Other Brother      SLE    Other Brother      SLE     Immunization History   Administered Date(s) Administered    TD Vaccine 01/01/1990     Current Outpatient Prescriptions   Medication Sig    cyclobenzaprine (FLEXERIL) 10 mg tablet Take 1 Tab by mouth three (3) times daily as needed for Muscle Spasm(s).  magnesium oxide (MAG-OX) 400 mg tablet Take 400 mg by mouth daily.  ascorbic acid (VITAMIN C) 250 mg tablet Take  by mouth.  beclomethasone (QVAR) 80 mcg/actuation inhaler Take 1 Puff by inhalation two (2) times a day.  ergocalciferol (VITAMIN D2) 50,000 unit capsule Take 1 Cap by mouth every seven (7) days.  albuterol (PROVENTIL HFA, VENTOLIN HFA) 90 mcg/actuation inhaler Take 2 Puffs by inhalation every four (4) hours as needed.  multivitamin (ONE A DAY) tablet Take 1 Tab by mouth daily.     furosemide (LASIX) 20 mg tablet One tablet daily    potassium chloride (K-DUR, KLOR-CON) 10 mEq tablet Take 1 Tab type (CMS/HCC)    2. Ischemic cardiomyopathy    3. Hypotension, unspecified hypotension type    4. Dyspnea, unspecified type    5. Edema, unspecified type          PLAN  CMP, ProBNP today.  She will stop lasix and start torsemide 20 mg daily.  Continue cardiac rehab.  BiV upgrade with Dr. Holt on November 17.  She will see Dr. Proctor as scheduled.  She will follow up in HF in 1 week.  Reinforced the importance of a low sodium diet (2000 mg/day) as well as a fluid restriction of 64 fl oz a day   Call the HF Clinic with any questions or concerns      Orders Placed This Encounter   • Basic Metabolic Panel   • NT proBNP   • DISCONTD: torsemide (DEMADEX) 20 MG tablet   • torsemide (DEMADEX) 20 MG tablet        Return to Clinic: 1 week    REVIEW OF SYSTEMS  A 12-point Review of Systems was performed and is negative except for HPI.     PHYSICAL EXAM  Vitals:    11/10/21 0749   BP: 112/64   BP Location: RUE - Right upper extremity   Patient Position: Sitting   Cuff Size: Regular   Pulse: 60   Resp: 18   Weight: 85.3 kg (188 lb 0.8 oz)   Height: 5' 4\" (1.626 m)       Body mass index is 32.28 kg/m².    Wt Readings from Last 4 Encounters:   11/10/21 85.3 kg (188 lb 0.8 oz)   11/03/21 84 kg (185 lb 1.2 oz)   10/28/21 84 kg (185 lb 3.2 oz)   10/28/21 83.9 kg (184 lb 14.4 oz)       Vitals and weights were reviewed during today's visit.    Gen: no acute distress, AOx3  Neck: Supple,   CV: RRR, distant heart tones  Chest: Lungs BCTA, non-labored respirations   Ext: warm, well perfused, + LE edema     CARDIOVASCULAR DIAGNOSTIC STUDIES      Cardiac diagnostic imaging/tests    Transthoracic Echocardiogram: 7/25/2021    --------------------------------------------------------------------------  STUDY CONCLUSIONS  SUMMARY:     1. Left ventricle: The cavity size is dilated. Wall thickness is below     normal limits. The ejection fraction was measured by visual estimation.     The ejection fraction is 15%.  2. Mitral valve: The annulus  by mouth daily.  omeprazole (PRILOSEC) 20 mg capsule Take 1 Cap by mouth daily.  lidocaine-EPINEPHrine (XYLOCAINE) 1 %-1:100,000 injection 10 mL by IntraDERMal route once for 1 dose. Indications: ADMINISTRATION OF LOCAL ANESTHESIA     No current facility-administered medications for this visit. Allergies   Allergen Reactions    Advair Diskus [Fluticasone-Salmeterol] Other (comments)     Hoarseness    Naproxen Unable to Obtain    Pcn [Penicillins] Rash     REVIEW OF SYSTEMS: mammo 7/15, hyst, colo Dr Reyes Levans 9/12  Ophtho  no vision change or eye pain  Oral  no mouth pain, tongue or tooth problems  Ears  no hearing loss, ear pain, fullness, no swallowing problems  Cardiac  no CP, PND, orthopnea, palpitations or syncope  Chest  no breast masses  Resp  no wheezing, chronic coughing, dyspnea  GI  no heartburn, nausea, vomiting, change in bowel habits, bleeding, hemorrhoids  Urinary  no dysuria, hematuria, flank pain, urgency, frequency  Genitals  no genital lesions, discharge, masses, ulceration, warts  Ortho  no swelling, dec ROM, myalgias  Endo - no polyuria, polydipsia, nocturia, hot flashes    Visit Vitals    /87 (BP 1 Location: Left arm, BP Patient Position: Sitting)    Pulse 81    Temp 98.2 °F (36.8 °C) (Oral)    Resp 14    Ht 5' 3\" (1.6 m)    Wt 217 lb 9.6 oz (98.7 kg)    SpO2 97%    BMI 38.55 kg/m2     A&O x3  Affect is appropriate. Mood stable  No apparent distress  Anicteric, no JVD, adenopathy or thyromegaly. No carotid bruits or radiated murmur  Lungs clear to auscultation, no wheezes or rales  Heart showed regular rate and rhythm. No murmur, rubs, gallops  Abdomen soft nontender, no hepatosplenomegaly or masses. Extremities with trace pedal edema.   Pulses 1-2+ symmetrically    LABS  From 8/12 showed   gluc 96,   cr 0.63, gfr 115, alt 10,           chol 197, tg 77, hdl 82, ldl-c 100, wbc 5.4, hb 12.6, plt 242, tsh 0.81, ua neg  From 3/14 showed   gluc 105, cr 0.58, gfr is mildly calcified.  3. Left atrium: The atrium is dilated.  4. Right ventricle: Pacemaker lead noted in right ventricle. The TAPSE is     normal, suggestive of normal RV systolic function.  5. Right atrium: Pacemaker lead noted in right atrium.         PERTINENT LABORATORY STUDIES    Recent Labs     04/26/21  1056 07/25/21  0545 07/26/21  0448 09/15/21  0000 09/15/21  0000 10/13/21  0713 10/20/21  0941 10/25/21  0907 10/28/21  1159 11/03/21  0924 11/10/21  0755   CHOLESTEROL 203* 234*  --  136  --   --   --   --   --   --   --    CALCLDL 105 139*  --  76  --   --   --   --   --   --   --    HDL 79 77  --  48  --   --   --   --   --   --   --    TRIGLYCERIDE 93 92  --  62  --   --   --   --   --   --   --    AST 17  --    < > 18   < >  --  24 23 20  --   --    SODIUM 141 140   < > 145   < > 144 141 138 138 139 139   CHLORIDE 109* 107   < > 109   < > 107 109* 109* 108* 109* 109*   BUN 22* 23*   < > 14   < > 16 23* 23* 22* 26* 24*   GFRA  --   --   --   --   --  >60  --   --   --   --   --    BCRAT 18 18  --   --    < >  --  18 18 18 18 18   POTASSIUM 3.7 4.0   < > 4.3   < > 4.5 4.2 3.7 3.5 4.1 4.1   GLUCOSE 88 89   < > 79   < > 97 116* 99 92 67* 90   CREATININE 1.20* 1.25*   < > 1.11   < > 1.06 1.30* 1.26* 1.24* 1.41* 1.35*   GFRNA  --   --   --   --   --  51  --   --   --   --   --    CALCIUM 9.4 9.6   < > 9.1   < > 9.8 9.5 9.3 9.4 9.1 8.9   TSH  --  5.180*  --   --   --   --   --   --   --   --   --     < > = values in this interval not displayed.     Lab Results   Component Value Date/Time    T4FREE 0.7 (L) 07/25/2021 05:45 AM     Recent Labs     07/28/21  0504 10/13/21  0834 10/13/21  1042 10/14/21  0626   WBC 6.4 7.7 7.9 5.7   RBC 4.30 4.27 4.31 4.34   HGB 12.8 12.7 12.8 13.0   HCT 40.3 40.8 41.5 41.2   MCV 93.7 95.6 96.3 94.9   MCHC 31.8* 31.1* 30.8* 31.6*   RDWCV 14.5 14.2 13.9 13.9    129* 133* 112*   TLYMPH 25  --  17 31     Lab Results   Component Value Date/Time    ALBUMIN 3.7 10/28/2021 11:59 AM     BILIRUBIN 0.6 10/28/2021 11:59 AM    ALKPT 106 10/28/2021 11:59 AM    GPT 26 10/28/2021 11:59 AM    GPT 13 09/15/2021 12:00 AM       PATIENT HISTORIES    Past Medical History:   Diagnosis Date   • Bipolar disorder, unspecified (CMS/[facility])    • CKD (chronic kidney disease)    • Congestive cardiac failure (CMS/[facility])    • COPD (chronic obstructive pulmonary disease) (CMS/[facility])    • Epilepsy (CMS/[facility])    • Hypercholesterolemia    • Hypertension    • Hypothyroidism    • Ischemic cardiomyopathy    • MI, old    • Occipital neuralgia    • PSVT (paroxysmal supraventricular tachycardia) (CMS/[facility])    • PVD (peripheral vascular disease) (CMS/[facility])    • TIA (transient ischemic attack)    • Vertigo    • Vertigo        Past Surgical History:   Procedure Laterality Date   • Appendectomy     • Coronary artery bypass graft     • Coronary stent placement      LAD 2011   • Icd implant      BSI single chamber 6/30/2015   • Lower extremity angiogram      right and left iliac artery stents        Family History     No family history on file.          Social History     Tobacco Use   • Smoking status: Former Smoker   • Smokeless tobacco: Never Used   Vaping Use   • Vaping Use: never used   Substance Use Topics   • Alcohol use: Yes     Alcohol/week: 1.0 standard drink     Types: 1 Glasses of wine per week   • Drug use: Not Currently       Allergies   Allergen Reactions   • Heparin Other (See Comments)       Current Medications    ACETAMINOPHEN (TYLENOL) 325 MG TABLET    Take 650 mg by mouth every 6 hours as needed for Pain. Do no exceed 3000 mg acetaminophen per day    ALBUTEROL (VENTOLIN) (2.5 MG/3ML) 0.083% NEBULIZER SOLUTION    Take 2.5 mg by nebulization every 4 hours as needed for Wheezing or Shortness of Breath.     ALBUTEROL 108 (90 BASE) MCG/ACT INHALER    Inhale 2 puffs into the lungs every 4 hours as needed for Shortness of Breath.     ASPIRIN 81 MG CHEWABLE TABLET    Chew 81 mg by mouth daily.    ATORVASTATIN (LIPITOR) 20 MG TABLET  101,     hba1c 5.8  From 6/15 showed   gluc 97,   cr 0.73, gfr>60,  alt<5,  hba1c 5.6, chol 192, tg 56, hdl 73, ldl-c 108, wbc 5.4, hb 12.3, plt 249, tsh 0.96, vit d 12.7, hiv neg  From 11/15 showed                            ddimer neg  From 5/16 showed   gluc 99,   cr 0.62, gfr 112,          wbc 5.9, hb 12.5, plt 258, tsh 1.04, ua neg,     proBNP 29    Results for orders placed or performed in visit on 75/82/88   METABOLIC PANEL, COMPREHENSIVE   Result Value Ref Range    Glucose 100 (H) 65 - 99 mg/dL    BUN 14 8 - 27 mg/dL    Creatinine 0.71 0.57 - 1.00 mg/dL    GFR est non-AA 91 >59 mL/min/1.73    GFR est  >59 mL/min/1.73    BUN/Creatinine ratio 20 12 - 28    Sodium 144 134 - 144 mmol/L    Potassium 4.4 3.5 - 5.2 mmol/L    Chloride 102 96 - 106 mmol/L    CO2 24 18 - 29 mmol/L    Calcium 9.2 8.7 - 10.3 mg/dL    Protein, total 7.4 6.0 - 8.5 g/dL    Albumin 4.2 3.6 - 4.8 g/dL    GLOBULIN, TOTAL 3.2 1.5 - 4.5 g/dL    A-G Ratio 1.3 1.2 - 2.2    Bilirubin, total 0.3 0.0 - 1.2 mg/dL    Alk.  phosphatase 138 (H) 39 - 117 IU/L    AST (SGOT) 16 0 - 40 IU/L    ALT (SGPT) 16 0 - 32 IU/L   CBC W/O DIFF   Result Value Ref Range    WBC 5.4 3.4 - 10.8 x10E3/uL    RBC 4.26 3.77 - 5.28 x10E6/uL    HGB 12.9 11.1 - 15.9 g/dL    HCT 39.6 34.0 - 46.6 %    MCV 93 79 - 97 fL    MCH 30.3 26.6 - 33.0 pg    MCHC 32.6 31.5 - 35.7 g/dL    RDW 14.0 12.3 - 15.4 %    PLATELET 652 069 - 651 x10E3/uL   LIPID PANEL   Result Value Ref Range    Cholesterol, total 212 (H) 100 - 199 mg/dL    Triglyceride 53 0 - 149 mg/dL    HDL Cholesterol 92 >39 mg/dL    VLDL, calculated 11 5 - 40 mg/dL    LDL, calculated 109 (H) 0 - 99 mg/dL   HEMOGLOBIN A1C W/O EAG   Result Value Ref Range    Hemoglobin A1c 5.2 4.8 - 5.6 %   VITAMIN D, 25 HYDROXY   Result Value Ref Range    VITAMIN D, 25-HYDROXY 12.8 (L) 30.0 - 100.0 ng/mL   CVD REPORT   Result Value Ref Range    INTERPRETATION Note      Patient Active Problem List   Diagnosis Code    Colon adenoma and    Take 20 mg by mouth at bedtime.     CLOPIDOGREL (PLAVIX) 75 MG TABLET    Take 75 mg by mouth every morning.     FLUOXETINE (PROZAC) 20 MG CAPSULE    Take 1 capsule by mouth daily.    LAMOTRIGINE (LAMICTAL) 25 MG TABLET    Take 25 mg by mouth daily.     LEVOTHYROXINE 75 MCG TABLET    Take 75 mcg by mouth every morning.     LORATADINE (CLARITIN) 10 MG TABLET    Take 10 mg by mouth daily as needed for Allergies.     LOSARTAN (COZAAR) 25 MG TABLET    Take 25 mg by mouth daily.    MECLIZINE (ANTIVERT) 25 MG TABLET    Take 25 mg by mouth as needed for Dizziness.     MELATONIN 10 MG CAP    Take 10 mg by mouth at bedtime.    METOPROLOL SUCCINATE (TOPROL-XL) 25 MG 24 HR TABLET    Take 0.5 tablets by mouth daily.    METRONIDAZOLE (FLAGYL) 250 MG TABLET    Take 250 mg by mouth at bedtime.    MIDODRINE (PROAMATINE) 5 MG TABLET    Take 1 tablet by mouth 2 times daily (before meals).    NYSTATIN (MYCOSTATIN) 980292 UNIT/GM POWDER    Apply topically daily. Cleanse areas prior to application. Apply to groin and under breasts.    QUETIAPINE (SEROQUEL) 50 MG TABLET    Take 1 tablet by mouth nightly.    SPIRIVA RESPIMAT 2.5 MCG/ACT INHALER    Inhale 2 puffs into the lungs as needed.     TOPIRAMATE (TOPAMAX) 25 MG TABLET    Take 25 mg by mouth 2 times daily.    TRELEGY ELLIPTA 100-62.5-25 MCG/INH INHALER    Inhale into the lungs as needed.     UMECLIDINIUM BROMIDE (INCRUSE ELLIPTA) 62.5 MCG/INH INHALER    Inhale 1 puff into the lungs daily.          For this visit I performed pre-charting, chart review, documenting and referring/communicating with other health care professionals. Allergies and Medications were reviewed with the patient and updated during today's visit. In addition, I discussed medication dosage, usage, goals of therapy, and side effects with patient. Medication reconciliation was done but medications not prescribed by me may be inaccurate. The patient's previous laboratory and cardiac diagnostic testing listed above  diverticulosis Dr. Clair Ross 2012 D12.6    Sleep apnea Dr. Mike Lanier 2010 G47.30    Venous insufficiency I87.2    Hypovitaminosis D E55.9    Hx of cancer of lung 2006 s/p resection Z85.118    IFG (impaired fasting glucose) R73.01    COPD (chronic obstructive pulmonary disease) ([facility]) J44.9    Obesity (BMI 30-39. 9) E66.9     Assessment and plan:  1. Morbid obesity. Previously declined eagle supps, medically supervised wt loss program, bariatrics. She agreed to nutrition consult  2. Colon adenoma and diverticulosis. Fiber, colo 2017 Dr Clair Ross to be scheduled  3. MIMI on cpap. F/U Dr. Mike Lanier  4. Hypovit d. Restart supp  5. H/O lung ca. Per Dr Billy Mcclain  6. IFG. Wt loss would be ideal.  Lifestyle and dietary measures  7. Chest pain. Sched Dr Calli Guy group. Restart ppi as trial  8. Flu and pvx declined today  9. Laryngitis. Probably viral, symptomatic tx and call if worsening sx        RTC 5/18    Above conditions discussed at length and patient vocalized understanding.   All questions answered to patient satifaction has been reviewed and analyzed to establish my current plan of care. Previous outside notes were reviewed and taken into consideration when deciding further treatment.    Nicole Brody, CNP  Heart Failure Nurse Practitioner

## 2022-02-28 ENCOUNTER — ANESTHESIA EVENT (OUTPATIENT)
Dept: ENDOSCOPY | Age: 67
End: 2022-02-28
Payer: COMMERCIAL

## 2022-03-01 ENCOUNTER — ANESTHESIA (OUTPATIENT)
Dept: ENDOSCOPY | Age: 67
End: 2022-03-01
Payer: COMMERCIAL

## 2022-03-01 ENCOUNTER — HOSPITAL ENCOUNTER (OUTPATIENT)
Age: 67
Setting detail: OUTPATIENT SURGERY
Discharge: HOME | End: 2022-03-01
Attending: INTERNAL MEDICINE | Admitting: INTERNAL MEDICINE
Payer: COMMERCIAL

## 2022-03-01 VITALS
RESPIRATION RATE: 16 BRPM | HEIGHT: 64 IN | SYSTOLIC BLOOD PRESSURE: 147 MMHG | OXYGEN SATURATION: 100 % | WEIGHT: 220 LBS | TEMPERATURE: 97.2 F | HEART RATE: 72 BPM | BODY MASS INDEX: 37.56 KG/M2 | DIASTOLIC BLOOD PRESSURE: 76 MMHG

## 2022-03-01 PROCEDURE — 2709999900 HC NON-CHARGEABLE SUPPLY: Performed by: INTERNAL MEDICINE

## 2022-03-01 PROCEDURE — 74011250636 HC RX REV CODE- 250/636: Performed by: NURSE ANESTHETIST, CERTIFIED REGISTERED

## 2022-03-01 PROCEDURE — 76060000031 HC ANESTHESIA FIRST 0.5 HR: Performed by: INTERNAL MEDICINE

## 2022-03-01 PROCEDURE — 76040000019: Performed by: INTERNAL MEDICINE

## 2022-03-01 PROCEDURE — 74011000250 HC RX REV CODE- 250: Performed by: NURSE ANESTHETIST, CERTIFIED REGISTERED

## 2022-03-01 PROCEDURE — 77030021593 HC FCPS BIOP ENDOSC BSC -A: Performed by: INTERNAL MEDICINE

## 2022-03-01 PROCEDURE — 00811 ANES LWR INTST NDSC NOS: CPT | Performed by: ANESTHESIOLOGY

## 2022-03-01 PROCEDURE — 00811 ANES LWR INTST NDSC NOS: CPT | Performed by: NURSE ANESTHETIST, CERTIFIED REGISTERED

## 2022-03-01 PROCEDURE — 77030008565 HC TBNG SUC IRR ERBE -B: Performed by: INTERNAL MEDICINE

## 2022-03-01 PROCEDURE — 88305 TISSUE EXAM BY PATHOLOGIST: CPT

## 2022-03-01 PROCEDURE — 77030013992 HC SNR POLYP ENDOSC BSC -B: Performed by: INTERNAL MEDICINE

## 2022-03-01 RX ORDER — SODIUM CHLORIDE 0.9 % (FLUSH) 0.9 %
5-40 SYRINGE (ML) INJECTION AS NEEDED
Status: DISCONTINUED | OUTPATIENT
Start: 2022-03-01 | End: 2022-03-01 | Stop reason: HOSPADM

## 2022-03-01 RX ORDER — LIDOCAINE HYDROCHLORIDE 20 MG/ML
INJECTION, SOLUTION EPIDURAL; INFILTRATION; INTRACAUDAL; PERINEURAL AS NEEDED
Status: DISCONTINUED | OUTPATIENT
Start: 2022-03-01 | End: 2022-03-01 | Stop reason: HOSPADM

## 2022-03-01 RX ORDER — SODIUM CHLORIDE, SODIUM LACTATE, POTASSIUM CHLORIDE, CALCIUM CHLORIDE 600; 310; 30; 20 MG/100ML; MG/100ML; MG/100ML; MG/100ML
25 INJECTION, SOLUTION INTRAVENOUS CONTINUOUS
Status: DISCONTINUED | OUTPATIENT
Start: 2022-03-01 | End: 2022-03-01 | Stop reason: HOSPADM

## 2022-03-01 RX ORDER — PROPOFOL 10 MG/ML
INJECTION, EMULSION INTRAVENOUS AS NEEDED
Status: DISCONTINUED | OUTPATIENT
Start: 2022-03-01 | End: 2022-03-01 | Stop reason: HOSPADM

## 2022-03-01 RX ORDER — SODIUM CHLORIDE 0.9 % (FLUSH) 0.9 %
5-40 SYRINGE (ML) INJECTION EVERY 8 HOURS
Status: DISCONTINUED | OUTPATIENT
Start: 2022-03-01 | End: 2022-03-01 | Stop reason: HOSPADM

## 2022-03-01 RX ORDER — SODIUM CHLORIDE, SODIUM LACTATE, POTASSIUM CHLORIDE, CALCIUM CHLORIDE 600; 310; 30; 20 MG/100ML; MG/100ML; MG/100ML; MG/100ML
50 INJECTION, SOLUTION INTRAVENOUS CONTINUOUS
Status: DISCONTINUED | OUTPATIENT
Start: 2022-03-01 | End: 2022-03-01 | Stop reason: HOSPADM

## 2022-03-01 RX ORDER — LIDOCAINE HYDROCHLORIDE 10 MG/ML
0.1 INJECTION, SOLUTION EPIDURAL; INFILTRATION; INTRACAUDAL; PERINEURAL AS NEEDED
Status: DISCONTINUED | OUTPATIENT
Start: 2022-03-01 | End: 2022-03-01 | Stop reason: HOSPADM

## 2022-03-01 RX ORDER — FAMOTIDINE 20 MG/1
20 TABLET, FILM COATED ORAL ONCE
Status: DISCONTINUED | OUTPATIENT
Start: 2022-03-01 | End: 2022-03-01 | Stop reason: HOSPADM

## 2022-03-01 RX ADMIN — PROPOFOL 80 MG: 10 INJECTION, EMULSION INTRAVENOUS at 12:08

## 2022-03-01 RX ADMIN — PROPOFOL 40 MG: 10 INJECTION, EMULSION INTRAVENOUS at 12:22

## 2022-03-01 RX ADMIN — SODIUM CHLORIDE, SODIUM LACTATE, POTASSIUM CHLORIDE, AND CALCIUM CHLORIDE: 600; 310; 30; 20 INJECTION, SOLUTION INTRAVENOUS at 12:00

## 2022-03-01 RX ADMIN — LIDOCAINE HYDROCHLORIDE 100 MG: 20 INJECTION, SOLUTION EPIDURAL; INFILTRATION; INTRACAUDAL; PERINEURAL at 12:18

## 2022-03-01 RX ADMIN — PROPOFOL 30 MG: 10 INJECTION, EMULSION INTRAVENOUS at 12:24

## 2022-03-01 RX ADMIN — PROPOFOL 40 MG: 10 INJECTION, EMULSION INTRAVENOUS at 12:10

## 2022-03-01 RX ADMIN — PROPOFOL 40 MG: 10 INJECTION, EMULSION INTRAVENOUS at 12:18

## 2022-03-01 RX ADMIN — PROPOFOL 40 MG: 10 INJECTION, EMULSION INTRAVENOUS at 12:14

## 2022-03-01 NOTE — PROCEDURES
[place]  [facility], [address]      Brief Procedure Note    Mellissa Vee  [birth year]  [id number]    Date of Procedure: 3/1/2022    Preoperative diagnosis: Colorectal cancer surveillance:  V76.51 - Z12.11  History of adenomatous polyps of colon:  Z86.010  History of hyperplastic colon polyp:  V12.72 - Z86.010  Asthma:  493.90 - J45.998  COPD (chronic obstructive pulmonary disease):  J44.9    Postoperative diagnosis:  x3 hepatic flexture polyps    Type of Anesthesia: MAC (monitered anesthesia care)    Description of Findings: same as post op dx    Procedure: Procedure(s):  COLONOSCOPY/polypectomies    :  Dr. Paz Lemons MD    Assistant(s): [unfilled]    Type of Anesthesia:MAC     EBL:None, no implants.     Specimens:   ID Type Source Tests Collected by Time Destination   1 : Hepatic Flexture Polyps Preservative Hepatic Flexure  Troy Duron MD 3/1/2022 1220 Pathology       Findings: See printed and scanned procedure note    Complications: None    Dr. Paz Lemons MD  3/1/2022  12:38 PM

## 2022-03-01 NOTE — ANESTHESIA PREPROCEDURE EVALUATION
Relevant Problems   RESPIRATORY SYSTEM   (+) COPD (chronic obstructive pulmonary disease) (HCC)   (+) IMMI (obstructive sleep apnea)      ENDOCRINE   (+) Severe obesity (BMI 35.0-39. 9) with comorbidity ([address]       Anesthetic History   No history of anesthetic complications            Review of Systems / Medical History  Patient summary reviewed and pertinent labs reviewed    Pulmonary    COPD: mild    Sleep apnea: No treatment    Asthma : well controlled       Neuro/Psych              Cardiovascular                  Exercise tolerance: >4 METS     GI/Hepatic/Renal           PUD     Endo/Other        Morbid obesity     Other Findings              Physical Exam    Airway  Mallampati: II  TM Distance: 4 - 6 cm  Neck ROM: normal range of motion   Mouth opening: Normal     Cardiovascular  Regular rate and rhythm,  S1 and S2 normal,  no murmur, click, rub, or gallop             Dental      Comments: Few lower teeth in mouth   Pulmonary  Breath sounds clear to auscultation               Abdominal  GI exam deferred       Other Findings            Anesthetic Plan    ASA: 3  Anesthesia type: MAC          Induction: Intravenous  Anesthetic plan and risks discussed with: Patient

## 2022-03-01 NOTE — H&P
Chief Complaint: History of polyps. History of present illness: No complaints. PMH:   Past Medical History:   Diagnosis Date    Asthma     Dr. Natalia Ingram of right eye     Dr Mercy Lei; central retinal vein occlusion    Colon adenoma     2009 Dr. Dwight Ahumada; adenoma and diverticulosis 9/12 Dr. Boyd Brooks; 12/26/17    COPD (chronic obstructive pulmonary disease) ([facility] [address]     and right lung nodules, stable on serial CT last 6/14    Glaucoma     Dr Louis Bryan    Hyperlipidemia 05/14/2018    calculated 10 year risk score 8.3% (3/20); declined statins repeatedly    Hypovitaminosis D 2011    Lung cancer ([facility] [address] 10/2006    s/p RIGHT lobectomy Dr Hiral Harper obesity ([facility] [address]     IF 2/19 start weight 219 lbs    MIMI (obstructive sleep apnea)     on CPAP, Dr. Adal Rodriguez 2010 . Does not use machine    PUD (peptic ulcer disease)     h pylori tx'ed    Pulmonary nodule 06/2014    4 nodules <4mm; stable 5/18    Venous insufficiency      Allergies:    Allergies   Allergen Reactions    Naproxen Unable to Obtain and Other (comments)    Penicillins Rash, Hives and Swelling    Advair Diskus [Fluticasone Propion-Salmeterol] Other (comments)     Hoarseness    Nsaids (Non-Steroidal Anti-Inflammatory Drug) Other (comments)     H/o PUD     Medications:   Current Facility-Administered Medications:     lidocaine (PF) (XYLOCAINE) 10 mg/mL (1 %) injection 0.1 mL, 0.1 mL, SubCUTAneous, PRN, Ilana Mora CRNA    lactated Ringers infusion, 25 mL/hr, IntraVENous, CONTINUOUS, Ilana Mora CRNA    sodium chloride (NS) flush 5-40 mL, 5-40 mL, IntraVENous, Q8H, Ilana Mora CRNA    sodium chloride (NS) flush 5-40 mL, 5-40 mL, IntraVENous, PRN, Ilana Mora CRNA    famotidine (PEPCID) tablet 20 mg, 20 mg, Oral, ONCE, Ilana Mora CRNA  FH:   Family History   Problem Relation Age of Onset    Breast Cancer Mother     Other Brother         SLE    Other Brother         SLE    Other Brother         SLE  Hypertension Father     Cancer Father      Social:   Social History     Socioeconomic History    Marital status: SINGLE    Number of children: 0   Occupational History    Occupation: [employer]   Tobacco Use    Smoking status: Former Smoker     Packs/day: 0.50     Years: 20.00     Pack years: 10.00     Types: Cigarettes     Quit date: 1990     Years since quittin.1    Smokeless tobacco: Never Used   Vaping Use    Vaping Use: Never used   Substance and Sexual Activity    Alcohol use: No    Drug use: Never    Sexual activity: Not Currently     Surgical H:   Past Surgical History:   Procedure Laterality Date    EGD      PUD on EGD, H pylori +txed . [facility]      Dr Sergio Oliver  adenoma; Dr Jelena Arenas   adenoma and tics; 17 sessile serrated and tubular adenomas and tics    HX HYSTERECTOMY      for fibroids    HX KNEE ARTHROSCOPY Bilateral     HX ORTHOPAEDIC Left     LEFT foot ([place]) Bone removed    IL THORACOSCOPY W/LOBECTOMY SINGLE LOBE Right        ROS: negative    Physical Exam:   Visit Vitals  BP (!) 161/71   Pulse 81   Temp 98.4 °F (36.9 °C)   Resp 16   Ht 5' 4\" (1.626 m)   Wt 99.8 kg (220 lb)   SpO2 100%   BMI 37.76 kg/m²     General appearance: alert, no distress  Eyes: pupils equal and reactive, extraocular eye movements intact  Nodes: No gross adenopathy in neck. Skin: no spider angiomata, jaundice, palmar erythema   Respiratory: clear to auscultation bilaterally  Cardiovascular: regular heart rate, no murmurs, no JVD, normal rate and regular rhythm  Abdomen: soft, non-tender, liver not enlarged, spleen not palpable, no obvious ascites  Extremities: no muscle wasting, no gross arthritic changes  Neurologic: alert and oriented, cranial nerves grossly intact, no asterixis    Labs: No results found for this or any previous visit (from the past 24 hour(s)). Imp/ Plan:  Will proceed with colonoscopy as planned. Risk benefits alternative including but not limited to infection, bleeding, perforation of viscous, allergic reaction and resultant morbidity and mortality was discussed. Chance of missing a significant lesion due to various reasons were discussed.       Cris Cotton MD  [facility], [address]

## 2022-03-01 NOTE — ANESTHESIA POSTPROCEDURE EVALUATION
Procedure(s):  COLONOSCOPY/polypectomies. MAC    Anesthesia Post Evaluation      Multimodal analgesia: multimodal analgesia used between 6 hours prior to anesthesia start to PACU discharge  Patient location during evaluation: bedside  Patient participation: complete - patient participated  Level of consciousness: awake  Pain score: 0  Pain management: adequate  Airway patency: patent  Anesthetic complications: no  Cardiovascular status: stable  Respiratory status: acceptable  Hydration status: acceptable  Post anesthesia nausea and vomiting:  none  Final Post Anesthesia Temperature Assessment:  Normothermia (36.0-37.5 degrees C)      INITIAL Post-op Vital signs:   Vitals Value Taken Time   /78 03/01/22 1355   Temp 36.2 °C (97.2 °F) 03/01/22 1341   Pulse 91 03/01/22 1356   Resp 10 03/01/22 1356   SpO2 100 % 03/01/22 1356   Vitals shown include unvalidated device data.

## 2022-03-01 NOTE — DISCHARGE INSTRUCTIONS
[facility] Colonoscopy: What to Expect at [address] [place] [address]  After you have a colonoscopy, you will stay at the clinic for 1 to 2 hours until the medicines wear off. Then you can go home. But you will need to arrange for a ride. Your doctor will tell you when you can eat and do your other usual activities. Your doctor will talk to you about when you will need your next colonoscopy. Your doctor can help you decide how often you need to be checked. This will depend on the results of your test and your risk for colorectal cancer. After the test, you may be bloated or have gas pains. You may need to pass gas. If a biopsy was done or a polyp was removed, you may have streaks of blood in your stool (feces) for a few days. This care sheet gives you a general idea about how long it will take for you to recover. But each person recovers at a different pace. Follow the steps below to get better as quickly as possible. How can you care for yourself at home? Activity  · Rest when you feel tired. · You can do your normal activities when it feels okay to do so. Diet  · Follow your doctor's directions for eating. · Unless your doctor has told you not to, drink plenty of fluids. This helps to replace the fluids that were lost during the colon prep. · Do not drink alcohol. Medicines  · If polyps were removed or a biopsy was done during the test, your doctor may tell you not to take aspirin or other anti-inflammatory medicines for a few days. These include ibuprofen (Advil, Motrin) and naproxen (Aleve). Other instructions  · For your safety, do not drive or operate machinery until the medicine wears off and you can think clearly. Your doctor may tell you not to drive or operate machinery until the day after your test.  · Do not sign legal documents or make major decisions until the medicine wears off and you can think clearly. The anesthesia can make it hard for you to fully understand what you are agreeing to.   Follow-up care is a key part of your treatment and safety. Be sure to make and go to all appointments, and call your doctor if you are having problems. It's also a good idea to know your test results and keep a list of the medicines you take. When should you call for help? Call 911 anytime you think you may need emergency care. For example, call if:  · You passed out (lost consciousness). · You pass maroon or bloody stools. · You have severe belly pain. Call your doctor now or seek immediate medical care if:  · Your stools are black and tarlike. · Your stools have streaks of blood, but you did not have a biopsy or any polyps removed. · You have belly pain, or your belly is swollen and firm. · You vomit. · You have a fever. · You are very dizzy. Watch closely for changes in your health, and be sure to contact your doctor if you have any problems. Where can you learn more? Go to [employer]  Enter E264 in the search box to learn more about \"Colonoscopy: What to Expect at Home. \"   © 0555-7327 [employer]. Care instructions adapted under license by [employer] (which disclaims liability or warranty for this information). This care instruction is for use with your licensed healthcare professional. If you have questions about a medical condition or this instruction, always ask your healthcare professional. [place] [address] any warranty or liability for your use of this information. Content Version: 28.8.913874; Current as of: November 14, 2014      DISCHARGE SUMMARY from Nurse     POST-PROCEDURE INSTRUCTIONS:    Call your Physician if you:  ? Observe any excess bleeding. ? Develop a temperature over 100.5o F.  ? Experience abdominal, shoulder or chest pain. ? Notice any signs of decreased circulation or nerve impairment to an extremity such as a change in color, persistent numbness, tingling, coldness or increase in pain. ?  Vomit blood or you have nausea and vomiting lasting longer than 4 hours. ? Are unable to take medications. ? Are unable to urinate within 8 hours after discharge following general anesthesia or intravenous sedation. For the next 24 hours after receiving general anesthesia or intravenous sedation, or while taking prescription Narcotics, limit your activities:  ? Do NOT drive a motor vehicle, operate hazard machinery or power tools, or perform tasks that require coordination. The medication you received during your procedure may have some effect on your mental awareness. ? Do NOT make important personal or business decisions. The medication you received during your procedure may have some effect on your mental awareness. ? Do NOT drink alcoholic beverages. These drinks do not mix well with the medications that have been given to you. ? Upon discharge from the hospital, you must be accompanied by a responsible adult. ? Resume your diet as directed by your physician. ? Resume medications as your physician has prescribed. ? Please give a list of your current medications to your Primary Care Provider. ? Please update this list whenever your medications are discontinued, doses are changed, or new medications (including over-the-counter products) are added. ? Please carry medication information at all times in case of emergency situations. These are general instructions for a healthy lifestyle:    No smoking/ No tobacco products/ Avoid exposure to second hand smoke.  Surgeon General's Warning:  Quitting smoking now greatly reduces serious risk to your health. Obesity, smoking, and a sedentary lifestyle greatly increase your risk for illness.    A healthy diet, regular physical exercise & weight monitoring are important for maintaining a healthy lifestyle   You may be retaining fluid if you have a history of heart failure or if you experience any of the following symptoms:  Weight gain of 3 pounds or more overnight or 5 pounds in a week, increased swelling in our hands or feet or shortness of breath while lying flat in bed. Please call your doctor as soon as you notice any of these symptoms; do not wait until your next office visit. Colorectal Screening   Colorectal cancer almost always develops from precancerous polyps (abnormal growths) in the colon or rectum. Screening tests can find precancerous polyps, so that they can be removed before they turn into cancer. Screening tests can also find colorectal cancer early, when treatment works best.  [facility] Speak with your physician about when you should begin screening and how often you should be tested. Additional Information    Educational references and/or instructions provided during this visit included:    See attached. Patient Education        Colon Polyps: Care Instructions  Your Care Instructions     Colon polyps are growths in the colon or the rectum. The cause of most colon polyps is not known, and most people who get them do not have any problems. But a certain kind can turn into cancer. For this reason, regular testing for colon polyps is important for people as they get older. It is also important for anyone who has an increased risk for colon cancer. Polyps are usually found through routine colon cancer screening tests. Although most colon polyps are not cancerous, they are usually removed and then tested for cancer. Screening for colon cancer saves lives because the cancer can usually be cured if it is caught early. If you have a polyp that is the type that can turn into cancer, you may need more tests to examine your entire colon. The doctor will remove any other polyps that he or she finds, and you will be tested more often. Follow-up care is a key part of your treatment and safety. Be sure to make and go to all appointments, and call your doctor if you are having problems. It's also a good idea to know your test results and keep a list of the medicines you take.   How can you care for yourself at home? Regular exams to look for colon polyps are the best way to prevent polyps from turning into colon cancer. These can include stool tests, sigmoidoscopy, colonoscopy, and CT colonography. Talk with your doctor about a testing schedule that is right for you. To prevent polyps  There is no home treatment that can prevent colon polyps. But these steps may help lower your risk for cancer. · Stay active. Being active can help you get to and stay at a healthy weight. Try to exercise on most days of the week. Walking is a good choice. · Eat well. Choose a variety of vegetables, fruits, legumes (such as peas and beans), fish, poultry, and whole grains. · Do not smoke. If you need help quitting, talk to your doctor about stop-smoking programs and medicines. These can increase your chances of quitting for good. · If you drink alcohol, limit how much you drink. Limit alcohol to 2 drinks a day for men and 1 drink a day for women. When should you call for help? Call your doctor now or seek immediate medical care if:    · You have severe belly pain.     · Your stools are maroon or very bloody. Watch closely for changes in your health, and be sure to contact your doctor if:    · You have a fever.     · You have nausea or vomiting.     · You have a change in bowel habits (new constipation or diarrhea).     · Your symptoms get worse or are not improving as expected. Where can you learn more? Go to http://www.hewitt.com/  Enter C571 in the search box to learn more about \"Colon Polyps: Care Instructions. \"  Current as of: December 17, 2020               Content Version: 13.0  © 3063-8639 [employer]. Care instructions adapted under license by [employer] (which disclaims liability or warranty for this information).  If you have questions about a medical condition or this instruction, always ask your healthcare professional. [employer] disclaims any warranty or liability for your use of this information. APPOINTMENTS:    Per MD Instruction. Discharge information has been reviewed with the patient. The patient verbalized understanding. .. Batsheva Pagan Patient armband removed and shredded

## 2022-03-18 PROBLEM — E78.5 HYPERLIPIDEMIA: Status: ACTIVE | Noted: 2018-05-14

## 2022-03-18 PROBLEM — E66.01 SEVERE OBESITY (BMI 35.0-39.9) WITH COMORBIDITY (HCC): Status: ACTIVE | Noted: 2018-05-14

## 2022-05-02 ENCOUNTER — TELEPHONE (OUTPATIENT)
Dept: INTERNAL MEDICINE CLINIC | Age: 67
End: 2022-05-02

## 2022-05-02 NOTE — TELEPHONE ENCOUNTER
----- Message from Dre Oneill sent at 5/2/2022  9:31 AM EDT -----  Subject: Message to Provider    QUESTIONS  Information for Provider? Pt is requesting her lab orders go to [email or web address],   please verify with Pt as to whether you can fax them to [address] [place] [address]. or if you   will need to mail the orders to the Pt.  ---------------------------------------------------------------------------  --------------  [address]  What is the best way for the office to contact you? OK to leave message on   voicemail  Preferred Call Back Phone Number? [phone]  ---------------------------------------------------------------------------  --------------  SCRIPT ANSWERS  Relationship to Patient?  Self

## 2022-05-02 NOTE — TELEPHONE ENCOUNTER
Dasia Flores [facility]  Subject: Message to Provider     QUESTIONS   Information for Provider? pt is calling in with the fax number for   Ariana Vanegas, number is [phone]. pt would like a call when this is   complete   ---------------------------------------------------------------------------   --------------   CALL BACK INFO   What is the best way for the office to contact you? OK to leave message on   voicemail   Preferred Call Back Phone Number? [phone]   ---------------------------------------------------------------------------   --------------   SCRIPT ANSWERS   Relationship to Patient?  Self

## 2022-05-02 NOTE — TELEPHONE ENCOUNTER
I spoke with the patient and she is calling back with the fax number for [facility].in order to have her order sent today.

## 2022-05-09 LAB
BUN SERPL-MCNC: 13 MG/DL (ref 8–27)
BUN/CREAT SERPL: 19 (ref 12–28)
CALCIUM SERPL-MCNC: 9.1 MG/DL (ref 8.7–10.3)
CHLORIDE SERPL-SCNC: 103 MMOL/L (ref 96–106)
CO2 SERPL-SCNC: 25 MMOL/L (ref 20–29)
CREAT SERPL-MCNC: 0.67 MG/DL (ref 0.57–1)
EGFR: 95 ML/MIN/1.73
ERYTHROCYTE [DISTWIDTH] IN BLOOD BY AUTOMATED COUNT: 12.1 % (ref 11.7–15.4)
EST. AVERAGE GLUCOSE BLD GHB EST-MCNC: 108 MG/DL
GLUCOSE SERPL-MCNC: 106 MG/DL (ref 65–99)
HBA1C MFR BLD: 5.4 % (ref 4.8–5.6)
HCT VFR BLD AUTO: 35.6 % (ref 34–46.6)
HGB BLD-MCNC: 11.8 G/DL (ref 11.1–15.9)
MCH RBC QN AUTO: 30.8 PG (ref 26.6–33)
MCHC RBC AUTO-ENTMCNC: 33.1 G/DL (ref 31.5–35.7)
MCV RBC AUTO: 93 FL (ref 79–97)
PLATELET # BLD AUTO: 262 X10E3/UL (ref 150–450)
POTASSIUM SERPL-SCNC: 4.5 MMOL/L (ref 3.5–5.2)
RBC # BLD AUTO: 3.83 X10E6/UL (ref 3.77–5.28)
SODIUM SERPL-SCNC: 140 MMOL/L (ref 134–144)
WBC # BLD AUTO: 7.2 X10E3/UL (ref 3.4–10.8)

## 2022-05-12 NOTE — PROGRESS NOTES
75 y.o. BLACK/ female who presents for evaluation    She has been seeing Dr Beti Solis and Dr Kendra Chawla for right eye blindness from central retinal vein occlusion    No cardiovascular complaints. She has not been able to exercise much due to orthopedic issues for which she sees Dr Khadra Hernandez. bp running in the 130-140s over 70s when she checks    No GI or  complaints. Overdue for mammo, lung ca/pulm nodule follow up, DEXA    LAST MEDICARE WELLNESS EXAM: 11/4/21    Past Medical History:   Diagnosis Date    Asthma     Dr. Mary Morris of right eye     Dr Teddy Hicks; central retinal vein occlusion    Colon adenoma     2009 Dr. Deacon Pinto; adenoma and diverticulosis 9/12 Dr. Nazanin Briseno; 12/26/17; Dr Nazanin Briseno 3/1/22    COPD (chronic obstructive pulmonary disease) ([facility]     and right lung nodules, stable on serial CT last 6/14    Glaucoma     Dr Beti Solis    Hearing loss 2022    bilateral    Hyperlipidemia 05/14/2018    calculated 10 year risk score 8.3% (3/20); declined statins repeatedly    Hypovitaminosis D 2011    Lung cancer ([facility] 10/2006    s/p RIGHT lobectomy Dr Suzie Arce obesity ([facility]     IF 2/19 start weight 219 lbs    MIMI (obstructive sleep apnea)     on CPAP, Dr. Duane Burows 2010 . Does not use machine    PUD (peptic ulcer disease)     h pylori tx'ed    Pulmonary nodule 06/2014    4 nodules <4mm; stable 5/18    Venous insufficiency      Past Surgical History:   Procedure Laterality Date    COLONOSCOPY N/A 3/1/2022    COLONOSCOPY/polypectomies performed by Tommy Chavis MD at [address] EGD      PUD on EGD, H pylori +txed 2009.       Kelsie Marquez Exon      Dr. Sree Diaz 2009 adenoma; Dr Nazanin Briseno 9/12  adenoma and tics; 12/26/17 sessile serrated and tubular adenomas and tics; 3/1/22 divertics, tubular adenoma    HX HYSTERECTOMY  1993    for fibroids    HX KNEE ARTHROSCOPY Bilateral     HX ORTHOPAEDIC Left     LEFT foot ([place]) Bone removed    MS THORACOSCOPY W/LOBECTOMY SINGLE LOBE Right 2006     Social History     Socioeconomic History    Marital status: SINGLE     Spouse name: Not on file    Number of children: 0    Years of education: Not on file    Highest education level: Not on file   Occupational History    Occupation: [employer]   Tobacco Use    Smoking status: Former Smoker     Packs/day: 0.50     Years: 20.00     Pack years: 10.00     Types: Cigarettes     Quit date: 1990     Years since quittin.3    Smokeless tobacco: Never Used   Vaping Use    Vaping Use: Never used   Substance and Sexual Activity    Alcohol use: No    Drug use: Never    Sexual activity: Not Currently   Other Topics Concern    Not on file   Social History Narrative    Not on file     Social Determinants of Health     Financial Resource Strain:     Difficulty of Paying Living Expenses: Not on file   Food Insecurity:     Worried About [employer] [employer] in the Last Year: Not on file    Roel of Food in the Last Year: Not on file   Transportation Needs:     Lack of Transportation (Medical): Not on file    Lack of Transportation (Non-Medical):  Not on file   Physical Activity:     Days of Exercise per Week: Not on file    Minutes of Exercise per Session: Not on file   Stress:     Feeling of Stress : Not on file   Social Connections:     Frequency of Communication with Friends and Family: Not on file    Frequency of Social Gatherings with Friends and Family: Not on file    Attends Temple Services: Not on file    Active Member of Clubs or Organizations: Not on file    Attends Club or Organization Meetings: Not on file    Marital Status: Not on file   Intimate Partner Violence:     Fear of Current or Ex-Partner: Not on file    Emotionally Abused: Not on file    Physically Abused: Not on file    Sexually Abused: Not on file   Housing Stability:     Unable to Pay for Housing in the Last Year: Not on file    Number of Places Lived in the Last Year: Not on file    Unstable Housing in the Last Year: Not on file     Family History   Problem Relation Age of Onset    Breast Cancer Mother     Other Brother         SLE    Other Brother         SLE    Other Brother         SLE    Hypertension Father     Cancer Father      Immunization History   Administered Date(s) Administered    TD Vaccine 01/01/1990     Current Outpatient Medications   Medication Sig    dorzolamide 2 % drop 2.5 mL, timolol 0.5 % drop 2.5 mL Apply 1 Drop to eye two (2) times a day.  ZINC PO Take  by mouth.  ergocalciferol (Vitamin D2) 1,250 mcg (50,000 unit) capsule Take 1 Cap by mouth every seven (7) days.  magnesium oxide (MAG-OX) 400 mg tablet Take 400 mg by mouth daily.  ascorbic acid (VITAMIN C) 250 mg tablet Take 250 mg by mouth daily.  albuterol (PROVENTIL HFA, VENTOLIN HFA) 90 mcg/actuation inhaler Take 2 Puffs by inhalation every four (4) hours as needed.  multivitamin (ONE A DAY) tablet Take 1 Tab by mouth daily. No current facility-administered medications for this visit.      Allergies   Allergen Reactions    Naproxen Unable to Obtain and Other (comments)    Penicillins Rash, Hives and Swelling    Advair Diskus [Fluticasone Propion-Salmeterol] Other (comments)     Hoarseness    Nsaids (Non-Steroidal Anti-Inflammatory Drug) Other (comments)     H/o PUD     REVIEW OF SYSTEMS: mammo 4/21, colo Dr Jessica Jang 9/12  Ophtho - no vision change or eye pain  Oral - no mouth pain, tongue or tooth problems  Ears - no hearing loss, ear pain, fullness, no swallowing problems  Cardiac - no CP, PND, orthopnea, palpitations or syncope  Chest - no breast masses  Resp - no wheezing, chronic coughing, dyspnea  GI - no heartburn, nausea, vomiting, change in bowel habits, bleeding, hemorrhoids  Urinary - no dysuria, hematuria, flank pain, urgency, frequency    Visit Vitals  /72   Pulse 87   Temp 97.9 °F (36.6 °C) (Temporal)   Resp 16   Ht 5' 4\" (1.626 m)   Wt 209 lb (94.8 kg)   SpO2 97%   BMI 35.87 kg/m²   A&O x3  Affect is appropriate. Mood stable  No apparent distress  Anicteric, no JVD, adenopathy or thyromegaly. No carotid bruits or radiated murmur  Lungs clear to auscultation, no wheezes or rales  Heart showed regular rate and rhythm. No murmur, rubs, gallops  Abdomen soft nontender, no hepatosplenomegaly or masses. Extremities with trace pedal bilat edema.   Pulses 1-2+ symmetrically    LABS  From 8/12 showed   gluc 96,   cr 0.63, gfr 115, alt 10,           chol 197, tg 77, hdl 82, ldl-c 100, wbc 5.4, hb 12.6, plt 242, tsh 0.81, ua neg  From 3/14 showed   gluc 105, cr 0.58, gfr 101,     hba1c 5.8  From 6/15 showed   gluc 97,   cr 0.73, gfr>60,  alt<5,  hba1c 5.6, chol 192, tg 56, hdl 73, ldl-c 108, wbc 5.4, hb 12.3, plt 249, tsh 0.96, vit d 12.7, hiv neg  From 11/15 showed                            ddimer neg  From 5/16 showed   gluc 99,   cr 0.62, gfr 112,                     wbc 5.9, hb 12.5, plt 258, tsh 1.04, ua neg,     proBNP 29  From 10/17 showed gluc 100, cr 0.71, gfr 91,   alt 16, hba1c 5.2, chol 212, tg 53, hdl 92, ldl-c 109, wbc 5.4, hb 12.9, plt 253,             vit d 12.8  From 4/18 showed   gluc 100, cr 0.57, gfr 99,                     wbc 6.5, hb 11.6, plt 274, uric 6.0,  esr 19  From 8/18 showed   gluc 84,   cr 0.50, gfr 103, alt 6,                              vit d 19.0  From 2/19 showed   gluc 109, cr 0.73, gfr 87,            chol 199, tg 66, hdl 84, ldl-c 102,                vit d 17.4  From 8/19 showed   gluc 110, cr 0.63, gfr>60,                                     vit d 27.2  From 3/20 showed                chol 200, tg 65, hdl 70, ldl-c 117, wbc 5.9, hb 11.9, plt 263,             vit d 19.0  From 9/20 showed   gluc 114, cr 0.85, gfr>60,  alt 15, hba1c 5.3, chol 197, tg 79, hdl 62, ldl-c 122,                vit d 25.2, hep c-  From 4/21 showed   gluc 97,   cr 0.59, gfr 96,   alt 11, hba1c 5.2, chol 204, tg 64, hdl 74, ldl-c 118, wbc 5.6, hb 12.2, plt 244,             vit d 31.6  From 10/21 showed gluc 114, cr 0.63, gfr 93,   alt 7,   hba1c 5.4, chol 216, tg 64, hdl 64, ldl-c 141,                vit d 29.7    Results for orders placed or performed in visit on 05/02/22   CBC W/O DIFF   Result Value Ref Range    WBC 7.2 3.4 - 10.8 x10E3/uL    RBC 3.83 3.77 - 5.28 x10E6/uL    HGB 11.8 11.1 - 15.9 g/dL    HCT 35.6 34.0 - 46.6 %    MCV 93 79 - 97 fL    MCH 30.8 26.6 - 33.0 pg    MCHC 33.1 31.5 - 35.7 g/dL    RDW 12.1 11.7 - 15.4 %    PLATELET 996 528 - 055 L47C8/XW   METABOLIC PANEL, BASIC   Result Value Ref Range    Glucose 106 (H) 65 - 99 mg/dL    BUN 13 8 - 27 mg/dL    Creatinine 0.67 0.57 - 1.00 mg/dL    eGFR 95 >59 mL/min/1.73    BUN/Creatinine ratio 19 12 - 28    Sodium 140 134 - 144 mmol/L    Potassium 4.5 3.5 - 5.2 mmol/L    Chloride 103 96 - 106 mmol/L    CO2 25 20 - 29 mmol/L    Calcium 9.1 8.7 - 10.3 mg/dL   HEMOGLOBIN A1C WITH EAG   Result Value Ref Range    Hemoglobin A1c 5.4 4.8 - 5.6 %    Estimated average glucose 108 mg/dL     We reviewed the patient's labs from the last several visits to point out trends in the numbers        Patient Active Problem List   Diagnosis Code    Colon adenoma and diverticulosis Dr. Hazel Ruano 2012 D12.6    MIMI (obstructive sleep apnea) G47.33    Venous insufficiency I87.2    Hypovitaminosis D E55.9    Hx of cancer of lung 2006 s/p resection Z85.118    IFG (impaired fasting glucose) R73.01    COPD (chronic obstructive pulmonary disease) (HCC) J44.9    Hyperlipidemia E78.5    Severe obesity (BMI 35.0-39. 9) with comorbidity ([facility] [address] E66.01     Assessment and plan:  1. Morbid obesity. See separate note  2. Colon adenoma and diverticulosis. Fiber, colo 2027  3. MIMI on cpap. F/U Dr. Tera Booth  4. Hypovit d. Continue supplementation  5. H/O lung ca and lung nodules. Follow up pulm and CT scheduled  6. IFG.   Wt loss would be ideal.  Lifestyle and dietary measures as above  7. Edema. Prn dyazide  8. HLP. Again declined statin therapy   9. Orthopedics. Follow up Dr Jackeline Hdez  10. Ophth. Follow up Dr Nate Aguilar and Dr Herminia Mccarthy  11. Declined flu, pvx, shingrix, covid again  12. DEXA and mammo ordered    RTC 11/22      Above conditions discussed at length and patient vocalized understanding. All questions answered to patient satisfaction          ICD-10-CM ICD-9-CM    1. Screening mammogram for breast cancer  Z12.31 V76.12 AIME 3D JEYSON W MAMMO BI DX INCL CAD   2. Menopausal and perimenopausal disorder  N95.9 627.9 DEXA BONE DENSITY STUDY AXIAL   3. Pulmonary nodule  R91.1 793.11 CT CHEST WO CONT   4. History of lung cancer  Z85. 118 V10.11 CT CHEST WO CONT   5. IFG (impaired fasting glucose)  R73.01 790.21    6. Chronic obstructive pulmonary disease, unspecified COPD type ([facility]  J44.9 496    7. Hx of cancer of lung 2006 s/p resection  Z85. 118 V10.11    8.  Hyperlipidemia, unspecified hyperlipidemia type  E78.5 272.4

## 2022-05-13 ENCOUNTER — OFFICE VISIT (OUTPATIENT)
Dept: INTERNAL MEDICINE CLINIC | Age: 67
End: 2022-05-13
Payer: COMMERCIAL

## 2022-05-13 DIAGNOSIS — N95.9 MENOPAUSAL AND PERIMENOPAUSAL DISORDER: ICD-10-CM

## 2022-05-13 DIAGNOSIS — J44.9 CHRONIC OBSTRUCTIVE PULMONARY DISEASE, UNSPECIFIED COPD TYPE (HCC): ICD-10-CM

## 2022-05-13 DIAGNOSIS — R91.1 PULMONARY NODULE: ICD-10-CM

## 2022-05-13 DIAGNOSIS — Z12.31 SCREENING MAMMOGRAM FOR BREAST CANCER: Primary | ICD-10-CM

## 2022-05-13 DIAGNOSIS — E78.5 HYPERLIPIDEMIA, UNSPECIFIED HYPERLIPIDEMIA TYPE: ICD-10-CM

## 2022-05-13 DIAGNOSIS — R73.01 IFG (IMPAIRED FASTING GLUCOSE): ICD-10-CM

## 2022-05-13 DIAGNOSIS — Z85.118 HISTORY OF LUNG CANCER: ICD-10-CM

## 2022-05-13 DIAGNOSIS — Z85.118 HX OF CANCER OF LUNG: ICD-10-CM

## 2022-05-13 PROCEDURE — G8400 PT W/DXA NO RESULTS DOC: HCPCS | Performed by: INTERNAL MEDICINE

## 2022-05-13 PROCEDURE — G8536 NO DOC ELDER MAL SCRN: HCPCS | Performed by: INTERNAL MEDICINE

## 2022-05-13 PROCEDURE — G8417 CALC BMI ABV UP PARAM F/U: HCPCS | Performed by: INTERNAL MEDICINE

## 2022-05-13 PROCEDURE — 1101F PT FALLS ASSESS-DOCD LE1/YR: CPT | Performed by: INTERNAL MEDICINE

## 2022-05-13 PROCEDURE — 1090F PRES/ABSN URINE INCON ASSESS: CPT | Performed by: INTERNAL MEDICINE

## 2022-05-13 PROCEDURE — G9899 SCRN MAM PERF RSLTS DOC: HCPCS | Performed by: INTERNAL MEDICINE

## 2022-05-13 PROCEDURE — G8510 SCR DEP NEG, NO PLAN REQD: HCPCS | Performed by: INTERNAL MEDICINE

## 2022-05-13 PROCEDURE — 99214 OFFICE O/P EST MOD 30 MIN: CPT | Performed by: INTERNAL MEDICINE

## 2022-05-13 PROCEDURE — 3017F COLORECTAL CA SCREEN DOC REV: CPT | Performed by: INTERNAL MEDICINE

## 2022-05-13 PROCEDURE — G8427 DOCREV CUR MEDS BY ELIG CLIN: HCPCS | Performed by: INTERNAL MEDICINE

## 2022-05-13 NOTE — PROGRESS NOTES
Jackeline Reno presents today for   Chief Complaint   Patient presents with    Follow-up       1. \"Have you been to the ER, urgent care clinic since your last visit? Hospitalized since your last visit? \" no    2. \"Have you seen or consulted any other health care providers outside of the [address] [place] [address] since your last visit? \" no     3. For patients aged 39-70: Has the patient had a colonoscopy / FIT/ Cologuard? Yes - no Care Gap present      If the patient is female:    4. For patients aged 41-77: Has the patient had a mammogram within the past 2 years? Yes - no Care Gap present  See top three    5. For patients aged 21-65: Has the patient had a pap smear?  Yes - no Care Gap present

## 2022-05-14 VITALS
BODY MASS INDEX: 35.68 KG/M2 | HEIGHT: 64 IN | TEMPERATURE: 97.9 F | RESPIRATION RATE: 16 BRPM | SYSTOLIC BLOOD PRESSURE: 134 MMHG | OXYGEN SATURATION: 97 % | HEART RATE: 87 BPM | DIASTOLIC BLOOD PRESSURE: 72 MMHG | WEIGHT: 209 LBS

## 2022-05-14 DIAGNOSIS — N95.9 MENOPAUSAL AND PERIMENOPAUSAL DISORDER: ICD-10-CM

## 2022-05-18 ENCOUNTER — TRANSCRIBE ORDER (OUTPATIENT)
Dept: SCHEDULING | Age: 67
End: 2022-05-18

## 2022-05-18 DIAGNOSIS — Z79.899 ENCOUNTER FOR LONG-TERM (CURRENT) USE OF OTHER MEDICATIONS: Primary | ICD-10-CM

## 2022-05-28 DIAGNOSIS — R91.1 PULMONARY NODULE: ICD-10-CM

## 2022-05-28 DIAGNOSIS — Z12.31 SCREENING MAMMOGRAM FOR BREAST CANCER: ICD-10-CM

## 2022-05-28 DIAGNOSIS — Z85.118 HISTORY OF LUNG CANCER: ICD-10-CM

## 2022-11-07 ENCOUNTER — TELEPHONE (OUTPATIENT)
Dept: INTERNAL MEDICINE CLINIC | Age: 67
End: 2022-11-07

## 2022-11-07 DIAGNOSIS — R73.01 IFG (IMPAIRED FASTING GLUCOSE): Primary | ICD-10-CM

## 2022-11-07 NOTE — PROGRESS NOTES
75 y.o. BLACK/ female who presents for evaluation    She continues to see Dr Connie Gonzalez and Dr Calvin Lind for right eye blindness from central retinal vein occlusion    No cardiovascular complaints. She has not been able to exercise much due to orthopedic issues. No GI or  complaints outside of constipation and passing 'stool balls'. Had colo earlier this year. She's also having recurring right sided abd pain ongoing for some weeks now    LAST MEDICARE WELLNESS EXAM: never as not medicare b    Past Medical History:   Diagnosis Date    Asthma     Dr. Brambila Angle of right eye     Dr Taj Son; central retinal vein occlusion    Colon adenoma     2009 Dr. Fermin Hwang; adenoma and diverticulosis 9/12 Dr. Aura Robles; 12/26/17; Dr Aura Robles 3/1/22    COPD (chronic obstructive pulmonary disease) ([facility]     and right lung nodules, stable on serial CT last 6/14    Glaucoma     Dr Connie Gonzalez    Hearing loss 2022    bilateral    Hyperlipidemia 05/14/2018    calculated 10 year risk score 8.3% (3/20); declined statins repeatedly    Lung cancer ([facility] 10/2006    s/p RIGHT lobectomy Dr Matthias Tello obesity ([facility]     IF 2/19 start weight 219 lbs    MIMI (obstructive sleep apnea)     on CPAP, Dr. Mel Oconnell 2010 . Does not use machine    PUD (peptic ulcer disease)     h pylori tx'ed    Pulmonary nodule 06/2014    4 nodules <4mm; stable 5/18    Venous insufficiency     Vitamin D deficiency 2011     Past Surgical History:   Procedure Laterality Date    COLONOSCOPY N/A 3/1/2022    COLONOSCOPY/polypectomies performed by Liyah Bustillos MD at [facility]COPY    EGD      PUD on EGD, H pylori +txed 2009.       Apolonia Ann 2009 adenoma; Dr Aura Robles 9/12  adenoma and tics; 12/26/17 sessile serrated and tubular adenomas and tics; 3/1/22 divertics, tubular adenoma    HX HYSTERECTOMY  1993    for fibroids    HX KNEE ARTHROSCOPY Bilateral HX ORTHOPAEDIC Left     LEFT foot ([place]) Bone removed    [place] THORACOSCOPY W/LOBECTOMY SINGLE LOBE Right 2006     Social History     Socioeconomic History    Marital status: SINGLE     Spouse name: Not on file    Number of children: 0    Years of education: Not on file    Highest education level: Not on file   Occupational History    Occupation: [employer]    Tobacco Use    Smoking status: Former     Packs/day: 0.50     Years: 20.00     Pack years: 10.00     Types: Cigarettes     Quit date: 1990     Years since quittin.8    Smokeless tobacco: Never   Vaping Use    Vaping Use: Never used   Substance and Sexual Activity    Alcohol use: No    Drug use: Never    Sexual activity: Not Currently   Other Topics Concern    Not on file   Social History Narrative    Not on file     Social Determinants of Health     Financial Resource Strain: Not on file   Food Insecurity: Not on file   Transportation Needs: Not on file   Physical Activity: Not on file   Stress: Not on file   Social Connections: Not on file   Intimate Partner Violence: Not on file   Housing Stability: Not on file     Family History   Problem Relation Age of Onset    Breast Cancer Mother     Other Brother         SLE    Other Brother         SLE    Other Brother         SLE    Hypertension Father     Cancer Father      Immunization History   Administered Date(s) Administered    TD Vaccine 1990     Current Outpatient Medications   Medication Sig    cholecalciferol (Vitamin D3) 25 mcg (1,000 unit) cap Take  by mouth daily. dorzolamide 2 % drop 2.5 mL, timolol 0.5 % drop 2.5 mL Apply 1 Drop to eye two (2) times a day. ZINC PO Take  by mouth.    magnesium oxide (MAG-OX) 400 mg tablet Take 400 mg by mouth daily. ascorbic acid, vitamin C, (VITAMIN C) 250 mg tablet Take 250 mg by mouth daily. albuterol (PROVENTIL HFA, VENTOLIN HFA) 90 mcg/actuation inhaler Take 2 Puffs by inhalation every four (4) hours as needed.       multivitamin (ONE A DAY) tablet Take 1 Tab by mouth daily. ergocalciferol (Vitamin D2) 1,250 mcg (50,000 unit) capsule Take 1 Cap by mouth every seven (7) days. (Patient not taking: Reported on 11/11/2022)     No current facility-administered medications for this visit. Allergies   Allergen Reactions    Naproxen Unable to Obtain and Other (comments)    Penicillins Rash, Hives and Swelling    Advair Diskus [Fluticasone Propion-Salmeterol] Other (comments)     Hoarseness    Nsaids (Non-Steroidal Anti-Inflammatory Drug) Other (comments)     H/o PUD     REVIEW OF SYSTEMS: mammo 4/21, colo Dr Mikel Bland 9/12  Ophtho - no vision change or eye pain  Oral - no mouth pain, tongue or tooth problems  Ears - no hearing loss, ear pain, fullness, no swallowing problems  Cardiac - no CP, PND, orthopnea, palpitations or syncope  Chest - no breast masses  Resp - no wheezing, chronic coughing, dyspnea  GI - no heartburn, nausea, vomiting, change in bowel habits, bleeding, hemorrhoids  Urinary - no dysuria, hematuria, flank pain, urgency, frequency    Visit Vitals  /72   Pulse 93   Temp 97 °F (36.1 °C) (Temporal)   Resp 18   Ht 5' 4\" (1.626 m)   Wt 206 lb (93.4 kg)   SpO2 97%   BMI 35.36 kg/m²     A&O x3  Affect is appropriate. Mood stable  No apparent distress  Anicteric, no JVD, adenopathy or thyromegaly. No carotid bruits or radiated murmur  Lungs clear to auscultation, no wheezes or rales  Heart showed regular rate and rhythm. No murmur, rubs, gallops  Abdomen soft nondistended; mild right sided tenderness without rebound or guarding, no hepatosplenomegaly or masses. Extremities with trace pedal bilat edema.   Pulses 1-2+ symmetrically    LABS  From 8/12 showed   gluc 96,   cr 0.63, gfr 115, alt 10,            chol 197, tg 77, hdl 82, ldl-c 100, wbc 5.4, hb 12.6, plt 242, tsh 0.81, ua neg  From 3/14 showed   gluc 105, cr 0.58, gfr 101,      hba1c 5.8  From 6/15 showed   gluc 97,   cr 0.73, gfr>60,  alt<5,  hba1c 5.6, chol 192, tg 56, hdl 73, ldl-c 108, wbc 5.4, hb 12.3, plt 249, tsh 0.96, vit d 12.7, hiv neg  From 11/15 showed                              ddimer-  From 5/16 showed   gluc 99,   cr 0.62, gfr 112,                       wbc 5.9, hb 12.5, plt 258, tsh 1.04, ua neg,    proBNP 29  From 10/17 showed gluc 100, cr 0.71, gfr 91,   alt 16, hba1c 5.2, chol 212, tg 53, hdl 92, ldl-c 109, wbc 5.4, hb 12.9, plt 253,               vit d 12.8  From 4/18 showed   gluc 100, cr 0.57, gfr 99,                       wbc 6.5, hb 11.6, plt 274, uric 6.0,  esr 19  From 8/18 showed   gluc 84,   cr 0.50, gfr 103, alt 6,                                vit d 19.0  From 2/19 showed   gluc 109, cr 0.73, gfr 87,             chol 199, tg 66, hdl 84, ldl-c 102,                  vit d 17.4  From 8/19 showed   gluc 110, cr 0.63, gfr>60,                                       vit d 27.2  From 3/20 showed                 chol 200, tg 65, hdl 70, ldl-c 117, wbc 5.9, hb 11.9, plt 263,               vit d 19.0  From 9/20 showed   gluc 114, cr 0.85, gfr>60,  alt 15, hba1c 5.3, chol 197, tg 79, hdl 62, ldl-c 122,                  vit d 25.2, hep c-  From 4/21 showed   gluc 97,   cr 0.59, gfr 96,   alt 11, hba1c 5.2, chol 204, tg 64, hdl 74, ldl-c 118, wbc 5.6, hb 12.2, plt 244,               vit d 31.6  From 10/21 showed gluc 114, cr 0.63, gfr 93,   alt 7,   hba1c 5.4, chol 216, tg 64, hdl 64, ldl-c 141,                  vit d 29.7  From 5/22 showed   gluc 106, cr 0.67, gfr>60,      hab1c 5.4, wbc 7.2, hb 11.8, plt 262    Results for orders placed or performed in visit on 11/07/22   CBC WITH AUTOMATED DIFF   Result Value Ref Range    WBC 5.6 3.4 - 10.8 x10E3/uL    RBC 3.84 3.77 - 5.28 x10E6/uL    HGB 12.1 11.1 - 15.9 g/dL    HCT 35.5 34.0 - 46.6 %    MCV 92 79 - 97 fL    MCH 31.5 26.6 - 33.0 pg    MCHC 34.1 31.5 - 35.7 g/dL    RDW 12.3 11.7 - 15.4 %    PLATELET 693 709 - 136 x10E3/uL    NEUTROPHILS 45 Not Estab. %    Lymphocytes 42 Not Estab. %    MONOCYTES 9 Not Estab. % EOSINOPHILS 3 Not Estab. %    BASOPHILS 1 Not Estab. %    ABS. NEUTROPHILS 2.6 1.4 - 7.0 x10E3/uL    Abs Lymphocytes 2.3 0.7 - 3.1 x10E3/uL    ABS. MONOCYTES 0.5 0.1 - 0.9 x10E3/uL    ABS. EOSINOPHILS 0.2 0.0 - 0.4 x10E3/uL    ABS. BASOPHILS 0.0 0.0 - 0.2 x10E3/uL    IMMATURE GRANULOCYTES 0 Not Estab. %    ABS. IMM. GRANS. 0.0 0.0 - 0.1 N54W2/DM   METABOLIC PANEL, BASIC   Result Value Ref Range    Glucose 104 (H) 70 - 99 mg/dL    BUN 9 8 - 27 mg/dL    Creatinine 0.51 (L) 0.57 - 1.00 mg/dL    eGFR 102 >59 mL/min/1.73    BUN/Creatinine ratio 18 12 - 28    Sodium 140 134 - 144 mmol/L    Potassium 4.9 3.5 - 5.2 mmol/L    Chloride 103 96 - 106 mmol/L    CO2 24 20 - 29 mmol/L    Calcium 9.5 8.7 - 10.3 mg/dL   HEMOGLOBIN A1C WITH EAG   Result Value Ref Range    Hemoglobin A1c 5.3 4.8 - 5.6 %    Estimated average glucose 105 mg/dL     We reviewed the patient's labs from the last several visits to point out trends in the numbers        Patient Active Problem List   Diagnosis Code    Colon adenoma and diverticulosis Dr. Francisca Pollock 2012 D12.6    MIMI (obstructive sleep apnea) G47.33    Venous insufficiency I87.2    Hypovitaminosis D E55.9    Hx of cancer of lung 2006 s/p resection Z85.118    IFG (impaired fasting glucose) R73.01    COPD (chronic obstructive pulmonary disease) (HCC) J44.9    Hyperlipidemia E78.5    Severe obesity (BMI 35.0-39. 9) with comorbidity ([facility] E66.01     Assessment and plan:  1. Morbid obesity. See separate note  2. Colon adenoma and diverticulosis. Fiber, colo 2027  3. MIMI on cpap. F/U Dr. Rick Fregoso  4. Hypovit d. Continue supplementation  5. H/O lung ca and lung nodules. Follow up pulm and CT later this afternoon  6. IFG. Wt loss would be ideal.  Lifestyle and dietary measures as above  7. Edema. Prn dyazide  8. HLP. Again declined statin therapy   9. Orthopedics. Follow up Dr Spencer Steele  10. Ophth. Follow up Dr Arturo Fischer and Dr Richard Herring  11. Declined flu, pvx, shingrix, covid again  12.   DEXA declined, she promises to schedule mammo  13. Abd pain. CT scheduled       RTC 5/23      Above conditions discussed at length and patient vocalized understanding. All questions answered to patient satisfaction          ICD-10-CM ICD-9-CM    1. Colon adenoma and diverticulosis Dr. Karma Hirsch 2012  D12.6 211.3       2. Chronic obstructive pulmonary disease, unspecified COPD type ([facility]  J44.9 496       3. Hyperlipidemia, unspecified hyperlipidemia type  E78.5 272.4 LIPID PANEL      4. IFG (impaired fasting glucose)  I80.93 905.40 METABOLIC PANEL, COMPREHENSIVE      HEMOGLOBIN A1C WITH EAG      5.  Lower abdominal pain  R10.30 789.09 CT ABD PELV WO CONT

## 2022-11-07 NOTE — TELEPHONE ENCOUNTER
Pt called Jarod Kyle and said she will call back in the morning when she is at [facility]

## 2022-11-07 NOTE — TELEPHONE ENCOUNTER
Pt is on way to [facility] to get drawn and lab orders are , can someone please enter those and let me know so they can be faxed?/

## 2022-11-08 NOTE — TELEPHONE ENCOUNTER
Patient called , states she is at [facility] now and is requesting for the lab orders to be faxed to [phone]. Orders printed and faxed.

## 2022-11-09 LAB
BASOPHILS # BLD AUTO: 0 X10E3/UL (ref 0–0.2)
BASOPHILS NFR BLD AUTO: 1 %
BUN SERPL-MCNC: 9 MG/DL (ref 8–27)
BUN/CREAT SERPL: 18 (ref 12–28)
CALCIUM SERPL-MCNC: 9.5 MG/DL (ref 8.7–10.3)
CHLORIDE SERPL-SCNC: 103 MMOL/L (ref 96–106)
CO2 SERPL-SCNC: 24 MMOL/L (ref 20–29)
CREAT SERPL-MCNC: 0.51 MG/DL (ref 0.57–1)
EGFR: 102 ML/MIN/1.73
EOSINOPHIL # BLD AUTO: 0.2 X10E3/UL (ref 0–0.4)
EOSINOPHIL NFR BLD AUTO: 3 %
ERYTHROCYTE [DISTWIDTH] IN BLOOD BY AUTOMATED COUNT: 12.3 % (ref 11.7–15.4)
EST. AVERAGE GLUCOSE BLD GHB EST-MCNC: 105 MG/DL
GLUCOSE SERPL-MCNC: 104 MG/DL (ref 70–99)
HBA1C MFR BLD: 5.3 % (ref 4.8–5.6)
HCT VFR BLD AUTO: 35.5 % (ref 34–46.6)
HGB BLD-MCNC: 12.1 G/DL (ref 11.1–15.9)
IMM GRANULOCYTES # BLD AUTO: 0 X10E3/UL (ref 0–0.1)
IMM GRANULOCYTES NFR BLD AUTO: 0 %
LYMPHOCYTES # BLD AUTO: 2.3 X10E3/UL (ref 0.7–3.1)
LYMPHOCYTES NFR BLD AUTO: 42 %
MCH RBC QN AUTO: 31.5 PG (ref 26.6–33)
MCHC RBC AUTO-ENTMCNC: 34.1 G/DL (ref 31.5–35.7)
MCV RBC AUTO: 92 FL (ref 79–97)
MONOCYTES # BLD AUTO: 0.5 X10E3/UL (ref 0.1–0.9)
MONOCYTES NFR BLD AUTO: 9 %
NEUTROPHILS # BLD AUTO: 2.6 X10E3/UL (ref 1.4–7)
NEUTROPHILS NFR BLD AUTO: 45 %
PLATELET # BLD AUTO: 219 X10E3/UL (ref 150–450)
POTASSIUM SERPL-SCNC: 4.9 MMOL/L (ref 3.5–5.2)
RBC # BLD AUTO: 3.84 X10E6/UL (ref 3.77–5.28)
SODIUM SERPL-SCNC: 140 MMOL/L (ref 134–144)
WBC # BLD AUTO: 5.6 X10E3/UL (ref 3.4–10.8)

## 2022-11-11 ENCOUNTER — OFFICE VISIT (OUTPATIENT)
Dept: INTERNAL MEDICINE CLINIC | Age: 67
End: 2022-11-11
Payer: COMMERCIAL

## 2022-11-11 ENCOUNTER — HOSPITAL ENCOUNTER (OUTPATIENT)
Dept: CT IMAGING | Age: 67
Discharge: HOME | End: 2022-11-11
Attending: INTERNAL MEDICINE
Payer: COMMERCIAL

## 2022-11-11 VITALS
BODY MASS INDEX: 35.17 KG/M2 | RESPIRATION RATE: 18 BRPM | TEMPERATURE: 97 F | HEART RATE: 93 BPM | SYSTOLIC BLOOD PRESSURE: 132 MMHG | OXYGEN SATURATION: 97 % | HEIGHT: 64 IN | WEIGHT: 206 LBS | DIASTOLIC BLOOD PRESSURE: 72 MMHG

## 2022-11-11 DIAGNOSIS — R10.30 LOWER ABDOMINAL PAIN: ICD-10-CM

## 2022-11-11 DIAGNOSIS — J44.9 CHRONIC OBSTRUCTIVE PULMONARY DISEASE, UNSPECIFIED COPD TYPE (HCC): ICD-10-CM

## 2022-11-11 DIAGNOSIS — E78.5 HYPERLIPIDEMIA, UNSPECIFIED HYPERLIPIDEMIA TYPE: ICD-10-CM

## 2022-11-11 DIAGNOSIS — R73.01 IFG (IMPAIRED FASTING GLUCOSE): ICD-10-CM

## 2022-11-11 DIAGNOSIS — D12.6 COLON ADENOMA: Primary | ICD-10-CM

## 2022-11-11 PROCEDURE — G8427 DOCREV CUR MEDS BY ELIG CLIN: HCPCS | Performed by: INTERNAL MEDICINE

## 2022-11-11 PROCEDURE — 1101F PT FALLS ASSESS-DOCD LE1/YR: CPT | Performed by: INTERNAL MEDICINE

## 2022-11-11 PROCEDURE — 99214 OFFICE O/P EST MOD 30 MIN: CPT | Performed by: INTERNAL MEDICINE

## 2022-11-11 PROCEDURE — 71250 CT THORAX DX C-: CPT

## 2022-11-11 PROCEDURE — G8432 DEP SCR NOT DOC, RNG: HCPCS | Performed by: INTERNAL MEDICINE

## 2022-11-11 PROCEDURE — G8417 CALC BMI ABV UP PARAM F/U: HCPCS | Performed by: INTERNAL MEDICINE

## 2022-11-11 PROCEDURE — 3017F COLORECTAL CA SCREEN DOC REV: CPT | Performed by: INTERNAL MEDICINE

## 2022-11-11 PROCEDURE — 1123F ACP DISCUSS/DSCN MKR DOCD: CPT | Performed by: INTERNAL MEDICINE

## 2022-11-11 PROCEDURE — G8536 NO DOC ELDER MAL SCRN: HCPCS | Performed by: INTERNAL MEDICINE

## 2022-11-11 PROCEDURE — G9899 SCRN MAM PERF RSLTS DOC: HCPCS | Performed by: INTERNAL MEDICINE

## 2022-11-11 PROCEDURE — 1090F PRES/ABSN URINE INCON ASSESS: CPT | Performed by: INTERNAL MEDICINE

## 2022-11-11 PROCEDURE — G8400 PT W/DXA NO RESULTS DOC: HCPCS | Performed by: INTERNAL MEDICINE

## 2022-11-11 RX ORDER — GLUCOSAMINE SULFATE 1500 MG
POWDER IN PACKET (EA) ORAL DAILY
COMMUNITY

## 2022-11-11 NOTE — PROGRESS NOTES
Kirstie Ruiz presents today for   Chief Complaint   Patient presents with    Follow-up     Venous insufficiency   IFG   COPD   Hypovitaminosis D  Hyperlipidemia     Labs     11-8-22     1. \"Have you been to the ER, urgent care clinic since your last visit? Hospitalized since your last visit? \" no    2. \"Have you seen or consulted any other health care providers outside of the [address] [place] [address] since your last visit? \" no     3. For patients aged 39-70: Has the patient had a colonoscopy / FIT/ Cologuard? Yes - no Care Gap present      If the patient is female:    4. For patients aged 41-77: Has the patient had a mammogram within the past 2 years? No Ordered 5-21-22      5. For patients aged 21-65: Has the patient had a pap smear?  NA - based on age or sex

## 2022-11-18 DIAGNOSIS — R10.30 LOWER ABDOMINAL PAIN: ICD-10-CM

## 2022-11-27 ENCOUNTER — TELEPHONE (OUTPATIENT)
Dept: INTERNAL MEDICINE CLINIC | Age: 67
End: 2022-11-27

## 2022-11-29 NOTE — TELEPHONE ENCOUNTER
LVM for the patient to return my call regarding their results.       Per Dr. Maida Rahman,   [address] [place] [address] call     Stable lung nodules

## 2022-11-30 NOTE — TELEPHONE ENCOUNTER
LVM for the patient to return my call regarding their results. Return Path message sent 11-28-22, by Vianey Everett.

## 2022-12-01 NOTE — TELEPHONE ENCOUNTER
LVM for the patient to return my call regarding their results.     Per Dr. Lainey Owens,      Stable lung nodules

## 2022-12-06 ENCOUNTER — TRANSCRIBE ORDER (OUTPATIENT)
Dept: SCHEDULING | Age: 67
End: 2022-12-06

## 2022-12-06 DIAGNOSIS — Z12.31 VISIT FOR SCREENING MAMMOGRAM: Primary | ICD-10-CM

## 2022-12-06 NOTE — TELEPHONE ENCOUNTER
Pt advised the CT showed the lung nodules are stable. Patient verbalized understand. No further questions or concerns at this time.

## 2022-12-17 ENCOUNTER — HOSPITAL ENCOUNTER (OUTPATIENT)
Dept: CT IMAGING | Age: 67
End: 2022-12-17
Attending: INTERNAL MEDICINE
Payer: COMMERCIAL

## 2022-12-17 PROCEDURE — 74176 CT ABD & PELVIS W/O CONTRAST: CPT

## 2022-12-28 ENCOUNTER — TELEPHONE (OUTPATIENT)
Dept: INTERNAL MEDICINE CLINIC | Age: 67
End: 2022-12-28

## 2022-12-28 NOTE — TELEPHONE ENCOUNTER
Pls call    CT abd/pelvis showed nothing acute  Would suggest trying to inc her bms to relieve constipation by using miralax with her fiber

## 2022-12-29 NOTE — TELEPHONE ENCOUNTER
Pt reached and made aware of results and message per Dr. Carol Ann Frost. Pt verbalized understanding.

## 2023-01-09 ENCOUNTER — HOSPITAL ENCOUNTER (OUTPATIENT)
Dept: MAMMOGRAPHY | Age: 68
Discharge: HOME | End: 2023-01-09
Attending: INTERNAL MEDICINE
Payer: COMMERCIAL

## 2023-01-09 DIAGNOSIS — Z12.31 VISIT FOR SCREENING MAMMOGRAM: ICD-10-CM

## 2023-01-09 PROCEDURE — 77063 BREAST TOMOSYNTHESIS BI: CPT

## 2023-02-04 DIAGNOSIS — R73.01 IFG (IMPAIRED FASTING GLUCOSE): Primary | ICD-10-CM

## 2023-02-05 DIAGNOSIS — E78.5 HYPERLIPIDEMIA, UNSPECIFIED HYPERLIPIDEMIA TYPE: Primary | ICD-10-CM

## 2023-04-12 ENCOUNTER — TELEPHONE (OUTPATIENT)
Age: 68
End: 2023-04-12

## 2023-04-27 ENCOUNTER — TELEPHONE (OUTPATIENT)
Age: 68
End: 2023-04-27

## 2023-04-27 NOTE — TELEPHONE ENCOUNTER
----- Message from Mathieu Catherine sent at 4/26/2023  2:20 PM EDT -----  Subject: Referral Request    Reason for referral request? pt would like a urine test when she gets her   labs. No symptoms. Just would like a test.   Provider patient wants to be referred to(if known):     Provider Phone Number(if known):     Additional Information for Provider?   ---------------------------------------------------------------------------  --------------  [address] [employer]    [phone]; OK to leave message on voicemail  ---------------------------------------------------------------------------  --------------

## 2023-04-27 NOTE — TELEPHONE ENCOUNTER
----- Message from Bonnie Montoya sent at 4/26/2023  2:20 PM EDT -----  Subject: Message to Provider    QUESTIONS  Information for Provider? pt said that she has not received the paper   orders for her labs in the mail yet.  ---------------------------------------------------------------------------  --------------  [address] [employer]  [phone]; OK to leave message on voicemail  ---------------------------------------------------------------------------  --------------  SCRIPT ANSWERS  Relationship to Patient?  Self

## 2023-04-27 NOTE — TELEPHONE ENCOUNTER
Patient is aware she will have to pay out of pocket for urine test if she is not having any problems.   Patient states not to worry about urine test.

## 2023-04-30 LAB
ALBUMIN SERPL-MCNC: 4.2 G/DL (ref 3.8–4.8)
ALBUMIN SERPL-MCNC: 4.2 G/DL (ref 3.8–4.8)
ALBUMIN/GLOB SERPL: 1.5 {RATIO} (ref 1.2–2.2)
ALBUMIN/GLOB SERPL: 1.5 {RATIO} (ref 1.2–2.2)
ALP SERPL-CCNC: 108 IU/L (ref 44–121)
ALP SERPL-CCNC: 108 IU/L (ref 44–121)
ALT SERPL-CCNC: 13 IU/L (ref 0–32)
ALT SERPL-CCNC: 13 IU/L (ref 0–32)
AST SERPL-CCNC: 14 IU/L (ref 0–40)
AST SERPL-CCNC: 14 IU/L (ref 0–40)
BILIRUB SERPL-MCNC: 0.4 MG/DL (ref 0–1.2)
BILIRUB SERPL-MCNC: 0.4 MG/DL (ref 0–1.2)
BUN SERPL-MCNC: 8 MG/DL (ref 8–27)
BUN SERPL-MCNC: 8 MG/DL (ref 8–27)
BUN/CREAT SERPL: 12 (ref 12–28)
BUN/CREAT SERPL: 12 (ref 12–28)
CALCIUM SERPL-MCNC: 9.3 MG/DL (ref 8.7–10.3)
CALCIUM SERPL-MCNC: 9.3 MG/DL (ref 8.7–10.3)
CHLORIDE SERPL-SCNC: 105 MMOL/L (ref 96–106)
CHLORIDE SERPL-SCNC: 105 MMOL/L (ref 96–106)
CHOLEST SERPL-MCNC: 188 MG/DL (ref 100–199)
CHOLEST SERPL-MCNC: 188 MG/DL (ref 100–199)
CO2 SERPL-SCNC: 26 MMOL/L (ref 20–29)
CO2 SERPL-SCNC: 26 MMOL/L (ref 20–29)
CREAT SERPL-MCNC: 0.67 MG/DL (ref 0.57–1)
CREAT SERPL-MCNC: 0.67 MG/DL (ref 0.57–1)
EGFRCR SERPLBLD CKD-EPI 2021: 95 ML/MIN/1.73
EGFRCR SERPLBLD CKD-EPI 2021: 95 ML/MIN/1.73
GLOBULIN SER CALC-MCNC: 2.8 G/DL (ref 1.5–4.5)
GLOBULIN SER CALC-MCNC: 2.8 G/DL (ref 1.5–4.5)
GLUCOSE SERPL-MCNC: 137 MG/DL (ref 70–99)
GLUCOSE SERPL-MCNC: 137 MG/DL (ref 70–99)
HDLC SERPL-MCNC: 68 MG/DL
HDLC SERPL-MCNC: 68 MG/DL
IMP & REVIEW OF LAB RESULTS: NORMAL
IMP & REVIEW OF LAB RESULTS: NORMAL
LDLC SERPL CALC-MCNC: 107 MG/DL (ref 0–99)
LDLC SERPL CALC-MCNC: 107 MG/DL (ref 0–99)
POTASSIUM SERPL-SCNC: 4 MMOL/L (ref 3.5–5.2)
POTASSIUM SERPL-SCNC: 4 MMOL/L (ref 3.5–5.2)
PROT SERPL-MCNC: 7 G/DL (ref 6–8.5)
PROT SERPL-MCNC: 7 G/DL (ref 6–8.5)
SODIUM SERPL-SCNC: 142 MMOL/L (ref 134–144)
SODIUM SERPL-SCNC: 142 MMOL/L (ref 134–144)
SPECIMEN STATUS REPORT, ROLRST: NORMAL
SPECIMEN STATUS REPORT: NORMAL
TRIGL SERPL-MCNC: 70 MG/DL (ref 0–149)
TRIGL SERPL-MCNC: 70 MG/DL (ref 0–149)
VLDLC SERPL CALC-MCNC: 13 MG/DL (ref 5–40)
VLDLC SERPL CALC-MCNC: 13 MG/DL (ref 5–40)

## 2023-05-01 NOTE — PROGRESS NOTES
Aundrea Hsu presents today for   Chief Complaint   Patient presents with    Follow-up    Discuss Labs     4-29-23    COPD    Hyperlipidemia     1. \"Have you been to the ER, urgent care clinic since your last visit? Hospitalized since your last visit? \" no    2. \"Have you seen or consulted any other health care providers outside of the [address] [place] [address] since your last visit? \" no     3. For patients aged 39-70: Has the patient had a colonoscopy / FIT/ Cologuard? Yes - no Care Gap present      If the patient is female:    4. For patients aged 41-77: Has the patient had a mammogram within the past 2 years? Yes - no Care Gap present      5. For patients aged 21-65: Has the patient had a pap smear?  NA - based on age or sex

## 2023-05-02 ENCOUNTER — TELEPHONE (OUTPATIENT)
Age: 68
End: 2023-05-02

## 2023-05-02 ENCOUNTER — OFFICE VISIT (OUTPATIENT)
Age: 68
End: 2023-05-02
Payer: COMMERCIAL

## 2023-05-02 ENCOUNTER — HOSPITAL ENCOUNTER (OUTPATIENT)
Facility: HOSPITAL | Age: 68
Discharge: HOME | End: 2023-05-05
Payer: COMMERCIAL

## 2023-05-02 DIAGNOSIS — D12.6 COLON ADENOMA: ICD-10-CM

## 2023-05-02 DIAGNOSIS — E78.5 HYPERLIPIDEMIA, UNSPECIFIED HYPERLIPIDEMIA TYPE: ICD-10-CM

## 2023-05-02 DIAGNOSIS — J44.9 CHRONIC OBSTRUCTIVE PULMONARY DISEASE, UNSPECIFIED COPD TYPE (HCC): ICD-10-CM

## 2023-05-02 DIAGNOSIS — R06.2 WHEEZING: ICD-10-CM

## 2023-05-02 DIAGNOSIS — Z00.00 PHYSICAL EXAM: Primary | ICD-10-CM

## 2023-05-02 DIAGNOSIS — R73.01 IFG (IMPAIRED FASTING GLUCOSE): ICD-10-CM

## 2023-05-02 DIAGNOSIS — Z85.118 HX OF CANCER OF LUNG: ICD-10-CM

## 2023-05-02 PROCEDURE — 99397 PER PM REEVAL EST PAT 65+ YR: CPT | Performed by: INTERNAL MEDICINE

## 2023-05-02 PROCEDURE — 71046 X-RAY EXAM CHEST 2 VIEWS: CPT

## 2023-05-02 RX ORDER — PREDNISONE 20 MG/1
20 TABLET ORAL 2 TIMES DAILY
Qty: 10 TABLET | Refills: 0 | Status: SHIPPED | OUTPATIENT
Start: 2023-05-02 | End: 2023-05-07

## 2023-05-02 RX ORDER — DORZOLAMIDE HYDROCHLORIDE AND TIMOLOL MALEATE 20; 5 MG/ML; MG/ML
SOLUTION/ DROPS OPHTHALMIC
COMMUNITY
Start: 2023-03-24

## 2023-05-02 RX ORDER — ALBUTEROL SULFATE 90 UG/1
2 AEROSOL, METERED RESPIRATORY (INHALATION) EVERY 4 HOURS PRN
Qty: 18 G | Refills: 11 | Status: SHIPPED | OUTPATIENT
Start: 2023-05-02

## 2023-05-02 RX ORDER — BUDESONIDE AND FORMOTEROL FUMARATE DIHYDRATE 80; 4.5 UG/1; UG/1
2 AEROSOL RESPIRATORY (INHALATION) 2 TIMES DAILY
Qty: 10.2 G | Refills: 3 | Status: SHIPPED | OUTPATIENT
Start: 2023-05-02

## 2023-05-02 SDOH — ECONOMIC STABILITY: INCOME INSECURITY: HOW HARD IS IT FOR YOU TO PAY FOR THE VERY BASICS LIKE FOOD, HOUSING, MEDICAL CARE, AND HEATING?: NOT HARD AT ALL

## 2023-05-02 SDOH — ECONOMIC STABILITY: HOUSING INSECURITY
IN THE LAST 12 MONTHS, WAS THERE A TIME WHEN YOU DID NOT HAVE A STEADY PLACE TO SLEEP OR SLEPT IN A SHELTER (INCLUDING NOW)?: NO

## 2023-05-02 SDOH — ECONOMIC STABILITY: FOOD INSECURITY: WITHIN THE PAST 12 MONTHS, YOU WORRIED THAT YOUR FOOD WOULD RUN OUT BEFORE YOU GOT MONEY TO BUY MORE.: NEVER TRUE

## 2023-05-02 SDOH — ECONOMIC STABILITY: FOOD INSECURITY: WITHIN THE PAST 12 MONTHS, THE FOOD YOU BOUGHT JUST DIDN'T LAST AND YOU DIDN'T HAVE MONEY TO GET MORE.: NEVER TRUE

## 2023-05-02 ASSESSMENT — PATIENT HEALTH QUESTIONNAIRE - PHQ9
SUM OF ALL RESPONSES TO PHQ QUESTIONS 1-9: 0
SUM OF ALL RESPONSES TO PHQ9 QUESTIONS 1 & 2: 0
2. FEELING DOWN, DEPRESSED OR HOPELESS: 0
SUM OF ALL RESPONSES TO PHQ QUESTIONS 1-9: 0
1. LITTLE INTEREST OR PLEASURE IN DOING THINGS: 0

## 2023-05-02 NOTE — TELEPHONE ENCOUNTER
Patient called and stated the her insurance does not cover budesonide-formoterol (SYMBICORT) 80-4.5 MCG/ACT AERO   and she needs the generic version of this medication.  Please advise

## 2023-05-03 VITALS
OXYGEN SATURATION: 97 % | WEIGHT: 205 LBS | HEART RATE: 80 BPM | TEMPERATURE: 97.8 F | SYSTOLIC BLOOD PRESSURE: 137 MMHG | DIASTOLIC BLOOD PRESSURE: 84 MMHG | RESPIRATION RATE: 19 BRPM | HEIGHT: 64 IN | BODY MASS INDEX: 35 KG/M2

## 2023-05-04 ENCOUNTER — TELEPHONE (OUTPATIENT)
Age: 68
End: 2023-05-04

## 2023-05-05 ENCOUNTER — TELEPHONE (OUTPATIENT)
Age: 68
End: 2023-05-05

## 2023-05-05 NOTE — TELEPHONE ENCOUNTER
----- Message from Alix Torres sent at 5/5/2023  9:31 AM EDT -----  Subject: Referral Request    Reason for referral request? lung Doctor  Provider patient wants to be referred to(if known):     Provider Phone Number(if known): Additional Information for Provider? pt stated that there was to be a   referral for a lung Doctor.    ---------------------------------------------------------------------------  --------------  Jessica Walker [facility]    [phone]; OK to leave message on voicemail  ---------------------------------------------------------------------------  --------------

## 2023-05-05 NOTE — TELEPHONE ENCOUNTER
Patient returned call and was given message. She verbalized understanding and has no further questions at this time. Thank you.

## 2023-05-05 NOTE — TELEPHONE ENCOUNTER
----- Message from Leroy Valencia sent at 5/5/2023  9:31 AM EDT -----  Subject: Referral Request    Reason for referral request? lung Doctor  Provider patient wants to be referred to(if known):     Provider Phone Number(if known): Additional Information for Provider? pt stated that there was to be a   referral for a lung Doctor.    ---------------------------------------------------------------------------  --------------  Florentino CONNOR    [phone]; OK to leave message on voicemail  ---------------------------------------------------------------------------  --------------

## 2023-05-05 NOTE — TELEPHONE ENCOUNTER
Contacted patient. Patient is asking the pharmacy what is a formulary alternative for Symbicort and will contact us back.

## 2023-05-05 NOTE — TELEPHONE ENCOUNTER
----- Message from Bk Florence sent at 5/5/2023  9:29 AM EDT -----  Subject: Medication Problem    Medication: budesonide-formoterol (SYMBICORT) 80-4.5 MCG/ACT AERO  Dosage: 80-4.5 mcg  Ordering Provider: corby    Question/Problem: pt stated that Insurance would not cover it.  She would   like to know if there is another medication that her Insurance would cover      Pharmacy: [id number]   [phone] Jurgen Chew [phone]    ---------------------------------------------------------------------------  --------------  [address] [employer]  [phone]; OK to leave message on voicemail  ---------------------------------------------------------------------------  --------------    SCRIPT ANSWERS  Relationship to Patient: Self

## 2023-05-05 NOTE — TELEPHONE ENCOUNTER
[address] [facility] Clinical Staff  Subject: Referral Request     Reason for referral request? lung Doctor   Provider patient wants to be referred to(if known):     Provider Phone Number(if known): Additional Information for Provider? pt stated that there was to be a   referral for a lung Doctor.    ---------------------------------------------------------------------------   --------------   Darnell [facility]ARY     [phone]; OK to leave message on voicemail   ---------------------------------------------------------------------------   --------------

## 2023-05-12 LAB
BASOPHILS # BLD AUTO: 0 X10E3/UL (ref 0–0.2)
BASOPHILS NFR BLD AUTO: 1 %
EOSINOPHIL # BLD AUTO: 0.1 X10E3/UL (ref 0–0.4)
EOSINOPHIL NFR BLD AUTO: 1 %
ERYTHROCYTE [DISTWIDTH] IN BLOOD BY AUTOMATED COUNT: 12.8 % (ref 11.7–15.4)
HCT VFR BLD AUTO: 35.4 % (ref 34–46.6)
HGB BLD-MCNC: 11.9 G/DL (ref 11.1–15.9)
IMM GRANULOCYTES # BLD AUTO: 0 X10E3/UL (ref 0–0.1)
IMM GRANULOCYTES NFR BLD AUTO: 0 %
LYMPHOCYTES # BLD AUTO: 3.2 X10E3/UL (ref 0.7–3.1)
LYMPHOCYTES NFR BLD AUTO: 46 %
MCH RBC QN AUTO: 30.8 PG (ref 26.6–33)
MCHC RBC AUTO-ENTMCNC: 33.6 G/DL (ref 31.5–35.7)
MCV RBC AUTO: 92 FL (ref 79–97)
MONOCYTES # BLD AUTO: 0.6 X10E3/UL (ref 0.1–0.9)
MONOCYTES NFR BLD AUTO: 8 %
NEUTROPHILS # BLD AUTO: 3.1 X10E3/UL (ref 1.4–7)
NEUTROPHILS NFR BLD AUTO: 44 %
PLATELET # BLD AUTO: 281 X10E3/UL (ref 150–450)
RBC # BLD AUTO: 3.86 X10E6/UL (ref 3.77–5.28)
WBC # BLD AUTO: 7.1 X10E3/UL (ref 3.4–10.8)

## 2023-05-13 LAB
BASOPHILS # BLD AUTO: 0 X10E3/UL (ref 0–0.2)
BASOPHILS NFR BLD AUTO: 1 %
EOSINOPHIL # BLD AUTO: 0.1 X10E3/UL (ref 0–0.4)
EOSINOPHIL NFR BLD AUTO: 1 %
ERYTHROCYTE [DISTWIDTH] IN BLOOD BY AUTOMATED COUNT: 12.8 % (ref 11.7–15.4)
HBA1C MFR BLD: 5.4 % (ref 4.8–5.6)
HBA1C MFR BLD: 5.4 % (ref 4.8–5.6)
HCT VFR BLD AUTO: 35.4 % (ref 34–46.6)
HGB BLD-MCNC: 11.9 G/DL (ref 11.1–15.9)
IMM GRANULOCYTES # BLD AUTO: 0 X10E3/UL (ref 0–0.1)
IMM GRANULOCYTES NFR BLD AUTO: 0 %
LYMPHOCYTES # BLD AUTO: 3.2 X10E3/UL (ref 0.7–3.1)
LYMPHOCYTES NFR BLD AUTO: 46 %
MCH RBC QN AUTO: 30.8 PG (ref 26.6–33)
MCHC RBC AUTO-ENTMCNC: 33.6 G/DL (ref 31.5–35.7)
MCV RBC AUTO: 92 FL (ref 79–97)
MONOCYTES # BLD AUTO: 0.6 X10E3/UL (ref 0.1–0.9)
MONOCYTES NFR BLD AUTO: 8 %
NEUTROPHILS # BLD AUTO: 3.1 X10E3/UL (ref 1.4–7)
NEUTROPHILS NFR BLD AUTO: 44 %
PLATELET # BLD AUTO: 281 X10E3/UL (ref 150–450)
RBC # BLD AUTO: 3.86 X10E6/UL (ref 3.77–5.28)
WBC # BLD AUTO: 7.1 X10E3/UL (ref 3.4–10.8)

## 2023-08-31 ENCOUNTER — TELEPHONE (OUTPATIENT)
Age: 68
End: 2023-08-31

## 2023-08-31 DIAGNOSIS — R73.01 IFG (IMPAIRED FASTING GLUCOSE): Primary | ICD-10-CM

## 2023-08-31 DIAGNOSIS — E78.5 HYPERLIPIDEMIA, UNSPECIFIED HYPERLIPIDEMIA TYPE: ICD-10-CM

## 2023-08-31 NOTE — TELEPHONE ENCOUNTER
Appointment 9/5/23 ,please place orders if labs needed. :  May need to reschedule appointment if labs needed.

## 2023-08-31 NOTE — TELEPHONE ENCOUNTER
----- Message from John Eastman sent at 8/30/2023  2:23 PM EDT -----  Subject: Message to Provider    QUESTIONS  Information for Provider? Patient would like to know if labs are needed   prior to her upcoming visit  ---------------------------------------------------------------------------  --------------  [address]  [phone]; OK to leave message on voicemail  ---------------------------------------------------------------------------  --------------  SCRIPT ANSWERS  Relationship to Patient?  Self

## 2023-09-05 ENCOUNTER — TELEPHONE (OUTPATIENT)
Age: 68
End: 2023-09-05

## 2023-09-05 NOTE — TELEPHONE ENCOUNTER
----- Message from Alfonsoemma Reeys sent at 9/1/2023  3:49 PM EDT -----  Subject: Message to Provider    QUESTIONS  Information for Provider? Patient is needing her lab orders faxed over to   [facility]. The fax is [phone]  ---------------------------------------------------------------------------  --------------  [address]  [phone]; OK to leave message on voicemail  ---------------------------------------------------------------------------  --------------  SCRIPT ANSWERS  Relationship to Patient?  Self

## 2023-09-09 LAB
BASOPHILS # BLD AUTO: 0.1 X10E3/UL (ref 0–0.2)
BASOPHILS NFR BLD AUTO: 1 %
BUN SERPL-MCNC: 8 MG/DL (ref 8–27)
BUN/CREAT SERPL: 15 (ref 12–28)
CALCIUM SERPL-MCNC: 9.5 MG/DL (ref 8.7–10.3)
CHLORIDE SERPL-SCNC: 104 MMOL/L (ref 96–106)
CHOLEST SERPL-MCNC: 206 MG/DL (ref 100–199)
CO2 SERPL-SCNC: 25 MMOL/L (ref 20–29)
CREAT SERPL-MCNC: 0.55 MG/DL (ref 0.57–1)
EGFRCR SERPLBLD CKD-EPI 2021: 99 ML/MIN/1.73
EOSINOPHIL # BLD AUTO: 0.1 X10E3/UL (ref 0–0.4)
EOSINOPHIL NFR BLD AUTO: 2 %
ERYTHROCYTE [DISTWIDTH] IN BLOOD BY AUTOMATED COUNT: 11.9 % (ref 11.7–15.4)
GLUCOSE SERPL-MCNC: 103 MG/DL (ref 70–99)
HBA1C MFR BLD: 5.3 % (ref 4.8–5.6)
HCT VFR BLD AUTO: 36 % (ref 34–46.6)
HDLC SERPL-MCNC: 75 MG/DL
HGB BLD-MCNC: 12.1 G/DL (ref 11.1–15.9)
IMM GRANULOCYTES # BLD AUTO: 0 X10E3/UL (ref 0–0.1)
IMM GRANULOCYTES NFR BLD AUTO: 0 %
IMP & REVIEW OF LAB RESULTS: NORMAL
LDLC SERPL CALC-MCNC: 119 MG/DL (ref 0–99)
LYMPHOCYTES # BLD AUTO: 3 X10E3/UL (ref 0.7–3.1)
LYMPHOCYTES NFR BLD AUTO: 46 %
MCH RBC QN AUTO: 30.7 PG (ref 26.6–33)
MCHC RBC AUTO-ENTMCNC: 33.6 G/DL (ref 31.5–35.7)
MCV RBC AUTO: 91 FL (ref 79–97)
MONOCYTES # BLD AUTO: 0.4 X10E3/UL (ref 0.1–0.9)
MONOCYTES NFR BLD AUTO: 7 %
NEUTROPHILS # BLD AUTO: 2.9 X10E3/UL (ref 1.4–7)
NEUTROPHILS NFR BLD AUTO: 44 %
PLATELET # BLD AUTO: 237 X10E3/UL (ref 150–450)
POTASSIUM SERPL-SCNC: 4.3 MMOL/L (ref 3.5–5.2)
RBC # BLD AUTO: 3.94 X10E6/UL (ref 3.77–5.28)
SODIUM SERPL-SCNC: 141 MMOL/L (ref 134–144)
SPECIMEN STATUS REPORT: NORMAL
TRIGL SERPL-MCNC: 66 MG/DL (ref 0–149)
VLDLC SERPL CALC-MCNC: 12 MG/DL (ref 5–40)
WBC # BLD AUTO: 6.5 X10E3/UL (ref 3.4–10.8)

## 2023-09-10 NOTE — PROGRESS NOTES
70 y.o. female who presents for evaluation. She continues to see Dr Boni Carey and Dr Nicole Hartley for right eye blindness from central retinal vein occlusion    No exercise much due to orthopedic issues. She has been having some non exertional chest discomfort the last month or so. Once in a while, she has assoc left arm discomfort as well. No sob, sweats, n/v, can occur at rest    We [facility] for the [facility]d issues but she has not been able to schedule an appt insisting that she has to talk to someone and not a phone tree. Remains on her inhalers which help    No GI or  complaints    LAST MEDICARE WELLNESS EXAM: never as not medicare b    Past Medical History:   Diagnosis Date    Asthma     Dr. Ana Lilia Diana of right eye     Dr Zehra Hannah; central retinal vein occlusion    Colon adenoma     2009 Dr. Shayy Santos; adenoma and diverticulosis 9/12 Dr. Carla Aguilar; 12/26/17; Dr Carla Aguilar 3/1/22    COPD (chronic obstructive pulmonary disease) ([address])     and right lung nodules, stable on serial CT last 6/14    Glaucoma     Dr Boni Carey    Hearing loss 2022    bilateral    Hyperlipidemia 05/14/2018    calculated 10 year risk score 8.3% (3/20); declined statins repeatedly    IFG (impaired fasting glucose) 2014    Lung cancer ([address]) 10/2006    s/p RIGHT lobectomy Dr Tamia Smith    Morbid obesity ([address])     IF 2/19 start weight 219 lbs    YASEMIN (obstructive sleep apnea)     on CPAP, Dr. Deysi Perez 2010 .  Does not use machine    PUD (peptic ulcer disease)     h pylori tx'ed    Pulmonary nodule 06/2014    4 nodules <4mm; stable (5/18); no change (11/22)    Venous insufficiency     Vitamin D deficiency 2011     Past Surgical History:   Procedure Laterality Date    Vennie Drilling      Dr Boni Carey    COLONOSCOPY N/A     (3/1/22) divertics, TA    COLONOSCOPY      Dr. Bing Matamoros (2009) adenoma; Dr Carla Aguilar (9/12)  adenoma and tics; (12/26/17) SSA/TA and tics    ESOPHAGOGASTRODUODENOSCOPY      PUD on

## 2023-09-14 ENCOUNTER — OFFICE VISIT (OUTPATIENT)
Age: 68
End: 2023-09-14
Payer: COMMERCIAL

## 2023-09-14 ENCOUNTER — TELEPHONE (OUTPATIENT)
Age: 68
End: 2023-09-14

## 2023-09-14 VITALS
SYSTOLIC BLOOD PRESSURE: 138 MMHG | BODY MASS INDEX: 35.34 KG/M2 | HEART RATE: 74 BPM | OXYGEN SATURATION: 97 % | HEIGHT: 64 IN | TEMPERATURE: 98.2 F | DIASTOLIC BLOOD PRESSURE: 72 MMHG | WEIGHT: 207 LBS | RESPIRATION RATE: 16 BRPM

## 2023-09-14 DIAGNOSIS — R07.89 OTHER CHEST PAIN: Primary | ICD-10-CM

## 2023-09-14 DIAGNOSIS — J44.9 CHRONIC OBSTRUCTIVE PULMONARY DISEASE, UNSPECIFIED COPD TYPE (HCC): ICD-10-CM

## 2023-09-14 DIAGNOSIS — G47.33 OSA (OBSTRUCTIVE SLEEP APNEA): ICD-10-CM

## 2023-09-14 DIAGNOSIS — E78.5 HYPERLIPIDEMIA, UNSPECIFIED HYPERLIPIDEMIA TYPE: ICD-10-CM

## 2023-09-14 DIAGNOSIS — I87.2 VENOUS INSUFFICIENCY: ICD-10-CM

## 2023-09-14 DIAGNOSIS — R73.01 IFG (IMPAIRED FASTING GLUCOSE): ICD-10-CM

## 2023-09-14 PROCEDURE — 3017F COLORECTAL CA SCREEN DOC REV: CPT | Performed by: INTERNAL MEDICINE

## 2023-09-14 PROCEDURE — 1036F TOBACCO NON-USER: CPT | Performed by: INTERNAL MEDICINE

## 2023-09-14 PROCEDURE — 3023F SPIROM DOC REV: CPT | Performed by: INTERNAL MEDICINE

## 2023-09-14 PROCEDURE — G8427 DOCREV CUR MEDS BY ELIG CLIN: HCPCS | Performed by: INTERNAL MEDICINE

## 2023-09-14 PROCEDURE — G8400 PT W/DXA NO RESULTS DOC: HCPCS | Performed by: INTERNAL MEDICINE

## 2023-09-14 PROCEDURE — 1123F ACP DISCUSS/DSCN MKR DOCD: CPT | Performed by: INTERNAL MEDICINE

## 2023-09-14 PROCEDURE — 99214 OFFICE O/P EST MOD 30 MIN: CPT | Performed by: INTERNAL MEDICINE

## 2023-09-14 PROCEDURE — 1090F PRES/ABSN URINE INCON ASSESS: CPT | Performed by: INTERNAL MEDICINE

## 2023-09-14 PROCEDURE — 93000 ELECTROCARDIOGRAM COMPLETE: CPT | Performed by: INTERNAL MEDICINE

## 2023-09-14 PROCEDURE — G8417 CALC BMI ABV UP PARAM F/U: HCPCS | Performed by: INTERNAL MEDICINE

## 2023-09-14 ASSESSMENT — PATIENT HEALTH QUESTIONNAIRE - PHQ9
2. FEELING DOWN, DEPRESSED OR HOPELESS: 0
SUM OF ALL RESPONSES TO PHQ QUESTIONS 1-9: 0
1. LITTLE INTEREST OR PLEASURE IN DOING THINGS: 0
SUM OF ALL RESPONSES TO PHQ QUESTIONS 1-9: 0
SUM OF ALL RESPONSES TO PHQ9 QUESTIONS 1 & 2: 0

## 2023-09-14 NOTE — PROGRESS NOTES
Michael Donahue presents today for   Chief Complaint   Patient presents with    Follow-up       1. \"Have you been to the ER, urgent care clinic since your last visit? Hospitalized since your last visit? \" no    2. \"Have you seen or consulted any other health care providers outside of the [address] [place] [address] since your last visit? \" no     3. For patients aged 43-73: Has the patient had a colonoscopy / FIT/ Cologuard? Yes - no Care Gap present      If the patient is female:    4. For patients aged 43-66: Has the patient had a mammogram within the past 2 years? Yes - no Care Gap present      5. For patients aged 21-65: Has the patient had a pap smear?  Yes - no Care Gap present

## 2023-09-14 NOTE — TELEPHONE ENCOUNTER
Patient will go offsite to get labs done for her 4 month follow up, please advise when orders are placed so they can be mailed, thank you.

## 2023-11-22 NOTE — MR AVS SNAPSHOT
[address] [facility] 
 
 
 [address], [address] [facility] [address] 
[phone] Patient: Manpreet Epstein MRN: [id number] FVK:[id number] Visit Information Date & Time Provider Department Dept. Phone Encounter #  
 4/13/2018 11:00 AM Jean-Pierre Benavidez MD [facility] [phone] Your Appointments 4/13/2018  2:45 PM  
PROCEDURE with [facility] 1 [facility] ([facility]) Appt Note: Acute Venous Rt foot/Dr Verduzco Stat  
 [facility] [address], [place] [address] [facility] [address]  
[phone] 12[address] [facility] [address] [facility] [address] 5/14/2018  1:45 PM  
Office Visit with Jean-Pierre Benavidez MD  
[facility]) Appt Note: 6 month f/u  
 [id number]  
  
   
 [address] [facility], [address] Upcoming Health Maintenance Date Due Hepatitis C Screening 1955 BREAST CANCER SCRN MAMMOGRAM 11/13/2019 DTaP/Tdap/Td series (2 - Td) 8/31/2026 COLONOSCOPY 12/26/2027 Allergies as of 4/13/2018  Review Complete On: 4/13/2018 By: Bill Poe Severity Noted Reaction Type Reactions Advair Diskus [Fluticasone-salmeterol]   Intolerance Other (comments) Hoarseness Naproxen    Unable to Obtain Pcn [Penicillins]    Rash Current Immunizations  Reviewed on 8/28/2012 Name Date  
 TD Vaccine 1/1/1990 Not reviewed this visit You Were Diagnosed With   
  
 Codes Comments Chronic pain of right ankle    -  Primary ICD-10-CM: M25.571, G89.29 ICD-9-CM: 719.47, 338.29 Right foot pain     ICD-10-CM: M79.671 ICD-9-CM: 729.5 Swelling of foot joint, right     ICD-10-CM: M25.474 ICD-9-CM: 719.07 Vitals BP Pulse Temp Resp Height(growth percentile) Weight(growth percentile) 122/68 (BP 1 Location: Left arm, BP Patient Position: Sitting) 81 97.7 °F (36.5 °C) (Oral) 18 5' 3\" (1.6 m) 219 lb (99.3 kg) SpO2 BMI OB Status Smoking Status 97% 38.79 kg/m2 Hysterectomy Former Smoker Vitals History BMI and BSA Data Body Mass Index Body Surface Area 38.79 kg/m 2 2.1 m 2 Preferred Pharmacy Pharmacy Name Phone [facility], [address] [place] [address] [phone] Your Updated Medication List  
  
   
This list is accurate as of 4/13/18 12:09 PM.  Always use your most recent med list.  
  
  
  
  
 albuterol 90 mcg/actuation inhaler Commonly known as:  PROVENTIL HFA, VENTOLIN HFA, PROAIR HFA Take 2 Puffs by inhalation every four (4) hours as needed. beclomethasone 80 mcg/actuation [employer] Commonly known as:  QVAR Take 1 Puff by inhalation two (2) times a day. cyclobenzaprine 10 mg tablet Commonly known as:  FLEXERIL Take 1 Tab by mouth three (3) times daily as needed for Muscle Spasm(s). ergocalciferol 50,000 unit capsule Commonly known as:  VITAMIN D2 Take 1 Cap by mouth every seven (7) days. furosemide 20 mg tablet Commonly known as:  LASIX One tablet daily HYDROcodone-acetaminophen 5-325 mg per tablet Commonly known as:  Fredrica Mathew Take 1 Tab by mouth every eight (8) hours as needed for Pain. Max Daily Amount: 3 Tabs. indomethacin 50 mg capsule Commonly known as:  INDOCIN Take  by mouth three (3) times daily. lidocaine-EPINEPHrine 1 %-1:100,000 injection Commonly known as:  XYLOCAINE 10 mL by IntraDERMal route once for 1 dose. Indications: ADMINISTRATION OF LOCAL ANESTHESIA  
  
 magnesium oxide 400 mg tablet Commonly known as:  MAG-OX Take 400 mg by mouth daily. multivitamin tablet Commonly known as:  ONE A DAY Take 1 Tab by mouth daily. omeprazole 40 mg capsule Commonly known as:  PRILOSEC Take 1 Cap by mouth daily. potassium chloride 10 mEq tablet Commonly known as:  KLOR-CON Take 1 Tab by mouth daily. predniSONE 20 mg tablet Commonly known as:  [facility] Take 1 Tab by mouth daily. VITAMIN C 250 mg tablet Generic drug:  ascorbic acid (vitamin C) Take  by mouth. Prescriptions Printed Refills HYDROcodone-acetaminophen (NORCO) 5-325 mg per tablet 0 Sig: Take 1 Tab by mouth every eight (8) hours as needed for Pain. Max Daily Amount: 3 Tabs. Class: Print Route: Oral  
 predniSONE (DELTASONE) 20 mg tablet 0 Sig: Take 1 Tab by mouth daily. Class: Print Route: Oral  
  
We Performed the Following REFERRAL TO ORTHOPEDICS [ABJ500 Custom] To-Do List   
 04/13/2018 Lab:  NAFISA QL, W/REFLEX CASCADE   
  
 04/13/2018 Lab:  CBC W/O DIFF   
  
 04/13/2018 Imaging:  DUPLEX LOWER EXT VENOUS RIGHT   
  
 04/13/2018 Lab:  METABOLIC PANEL, BASIC   
  
 04/13/2018 Lab:  RHEUMATOID FACTOR, FLUID   
  
 04/13/2018 Lab:  SED RATE (ESR)   
  
 04/13/2018 Lab:  URIC ACID Referral Information Referral ID Referred By Referred To  
  
 [id number] [facility], [address] [place] [address], [address] [place], [facility] [address] Phone: [phone] Fax: [phone] Visits Status Start Date End Date 1 Authorized 4/13/18 4/13/19 If your referral has a status of pending review or denied, additional information will be sent to support the outcome of this decision. Introducing [facility]! Dear Rowdy Romero: 
Thank you for requesting a [employer] account. Our records indicate that you have previously registered for a [employer] account but its currently inactive. Please call our [employer] support line at [phone]. Additional Information If you have questions, please visit the Frequently Asked Questions section of the [employer] website at [email or web address] Remember, MyChart is NOT to be used for urgent needs. For medical emergencies, dial 911. Now available from your iPhone and Android! Please provide this summary of care documentation to your next provider. Your primary care clinician is listed as Facundo De Dios. If you have any questions after today's visit, please call [phone]. Yes

## 2024-02-21 ENCOUNTER — TELEPHONE (OUTPATIENT)
Age: 69
End: 2024-02-21

## 2024-02-21 NOTE — TELEPHONE ENCOUNTER
----- Message from Yvonne Kline sent at 2/20/2024  4:20 PM EST -----  Subject: Message to Provider    QUESTIONS  Information for Provider? URGENT? Patient needs her blood work orders   faxed to [facility], [address] [place]. Appt with Dr. Munoz on 02/29   for results. Fax is [phone]  ---------------------------------------------------------------------------  --------------  CALL BACK INFO  [phone]; OK to leave message on voicemail  ---------------------------------------------------------------------------  --------------  SCRIPT ANSWERS  Relationship to Patient? Self

## 2024-02-22 LAB
HBA1C MFR BLD: 5.4 % (ref 4.8–5.6)
SPECIMEN STATUS REPORT: NORMAL

## 2024-02-23 LAB
BUN SERPL-MCNC: 10 MG/DL (ref 8–27)
BUN/CREAT SERPL: 15 (ref 12–28)
CALCIUM SERPL-MCNC: 9.9 MG/DL (ref 8.7–10.3)
CHLORIDE SERPL-SCNC: 103 MMOL/L (ref 96–106)
CHOLEST SERPL-MCNC: 230 MG/DL (ref 100–199)
CO2 SERPL-SCNC: 25 MMOL/L (ref 20–29)
CREAT SERPL-MCNC: 0.65 MG/DL (ref 0.57–1)
EGFRCR SERPLBLD CKD-EPI 2021: 95 ML/MIN/1.73
GLUCOSE SERPL-MCNC: 114 MG/DL (ref 70–99)
HDLC SERPL-MCNC: 88 MG/DL
LDLC SERPL CALC-MCNC: 128 MG/DL (ref 0–99)
LDLC/HDLC SERPL: 1.5 RATIO (ref 0–3.2)
POTASSIUM SERPL-SCNC: 4.8 MMOL/L (ref 3.5–5.2)
SODIUM SERPL-SCNC: 142 MMOL/L (ref 134–144)
TRIGL SERPL-MCNC: 84 MG/DL (ref 0–149)
VLDLC SERPL CALC-MCNC: 14 MG/DL (ref 5–40)

## 2024-02-29 ENCOUNTER — OFFICE VISIT (OUTPATIENT)
Age: 69
End: 2024-02-29
Payer: COMMERCIAL

## 2024-02-29 DIAGNOSIS — Z85.118 HX OF CANCER OF LUNG: ICD-10-CM

## 2024-02-29 DIAGNOSIS — N95.9 MENOPAUSAL AND PERIMENOPAUSAL DISORDER: ICD-10-CM

## 2024-02-29 DIAGNOSIS — R73.01 IFG (IMPAIRED FASTING GLUCOSE): ICD-10-CM

## 2024-02-29 DIAGNOSIS — J44.9 CHRONIC OBSTRUCTIVE PULMONARY DISEASE, UNSPECIFIED COPD TYPE (HCC): ICD-10-CM

## 2024-02-29 DIAGNOSIS — Z12.31 SCREENING MAMMOGRAM FOR BREAST CANCER: ICD-10-CM

## 2024-02-29 DIAGNOSIS — Z00.00 PHYSICAL EXAM: Primary | ICD-10-CM

## 2024-02-29 DIAGNOSIS — D12.6 COLON ADENOMA: ICD-10-CM

## 2024-02-29 DIAGNOSIS — E78.5 HYPERLIPIDEMIA, UNSPECIFIED HYPERLIPIDEMIA TYPE: ICD-10-CM

## 2024-02-29 DIAGNOSIS — E66.01 SEVERE OBESITY (BMI 35.0-39.9) WITH COMORBIDITY (HCC): ICD-10-CM

## 2024-02-29 PROCEDURE — 99397 PER PM REEVAL EST PAT 65+ YR: CPT | Performed by: INTERNAL MEDICINE

## 2024-02-29 PROCEDURE — G8484 FLU IMMUNIZE NO ADMIN: HCPCS | Performed by: INTERNAL MEDICINE

## 2024-02-29 RX ORDER — M-VIT,TX,IRON,MINS/CALC/FOLIC 27MG-0.4MG
1 TABLET ORAL DAILY
COMMUNITY

## 2024-02-29 SDOH — ECONOMIC STABILITY: INCOME INSECURITY: HOW HARD IS IT FOR YOU TO PAY FOR THE VERY BASICS LIKE FOOD, HOUSING, MEDICAL CARE, AND HEATING?: NOT HARD AT ALL

## 2024-02-29 SDOH — ECONOMIC STABILITY: FOOD INSECURITY: WITHIN THE PAST 12 MONTHS, THE FOOD YOU BOUGHT JUST DIDN'T LAST AND YOU DIDN'T HAVE MONEY TO GET MORE.: NEVER TRUE

## 2024-02-29 SDOH — ECONOMIC STABILITY: FOOD INSECURITY: WITHIN THE PAST 12 MONTHS, YOU WORRIED THAT YOUR FOOD WOULD RUN OUT BEFORE YOU GOT MONEY TO BUY MORE.: NEVER TRUE

## 2024-02-29 ASSESSMENT — PATIENT HEALTH QUESTIONNAIRE - PHQ9
SUM OF ALL RESPONSES TO PHQ QUESTIONS 1-9: 0
SUM OF ALL RESPONSES TO PHQ9 QUESTIONS 1 & 2: 0
2. FEELING DOWN, DEPRESSED OR HOPELESS: 0
1. LITTLE INTEREST OR PLEASURE IN DOING THINGS: 0
SUM OF ALL RESPONSES TO PHQ QUESTIONS 1-9: 0

## 2024-02-29 NOTE — PROGRESS NOTES
Barbie Farrell presents today for   Chief Complaint   Patient presents with    Follow-up           \"Have you been to the ER, urgent care clinic since your last visit?  Hospitalized since your last visit?\"    NO    “Have you seen or consulted any other health care providers outside of [facility] since your last visit?”    NO       Have you had a mammogram?”   NO

## 2024-03-01 NOTE — PROGRESS NOTES
68 y.o. female who presents for RPE    She continues to see Dr Dixon and Dr Rojas/Dr Sebastian for right eye blindness from central retinal vein occlusion    No exercise much due to orthopedic issues.  She had NST which came back low risk in Oct    Now seeing Dr Velasco who added stiolto which she believes helps more.  CT neg, pfts done but unable to do lung vol/dlco    No GI or  complaints    LAST MEDICARE WELLNESS EXAM: never as not medicare b    Past Medical History:   Diagnosis Date    Asthma     Dr. Saab    Blindness of right eye     Dr Rojas; central retinal vein occlusion    Colon adenoma     2009 Dr. Montana; adenoma and diverticulosis 9/12 Dr. Montes; 12/26/17; Dr Montes 3/1/22    Coronary artery calcification 11/2023    dec statin repeatedly    Emphysema lung (HCC)     centrilobular and right lung nodules (6/14); Dr Saab (8/19); now Dr MCKENZIE Velasco/jaxon (2023) FEV1 74 no change postbd, ratio 74, unable to do volumes/dlco (11/23)    Glaucoma     Dr Dixon    H/O cardiovascular stress test     NST (10/13) low risk, ef 82%, tid 0.88;  stress echo (12/17) neg; NST (10.23) low risk, ef 82%, TID 0.88    H/O echocardiogram     ef 65%, gr 1 dd, no wma (12/17)    Hearing loss 2022    bilateral    Hyperlipidemia 05/14/2018    calculated 10 year risk score 8.3% (3/20); declined statins repeatedly    IFG (impaired fasting glucose) 2014    Immunization declined 2022    flu, pneumonia, shingrix, covid, RSV    Lung cancer (HCC) 10/2006    s/p RIGHT lobectomy Dr Ventura    Morbid obesity (HCC)     IF 2/19 start weight 219 lbs    YASEMIN (obstructive sleep apnea)     on CPAP, Dr. Sethi 2010 . Does not use machine    PUD (peptic ulcer disease)     h pylori tx'ed    Pulmonary nodule 06/2014    4 nodules <4mm; stable (5/18); no change (11/22); neg (11/23)    Venous insufficiency     Vitamin D deficiency 2011     Past Surgical History:   Procedure Laterality Date    APPENDECTOMY  1980s    CATARACT REMOVAL

## 2024-03-04 VITALS
OXYGEN SATURATION: 98 % | TEMPERATURE: 98 F | BODY MASS INDEX: 37.32 KG/M2 | HEART RATE: 81 BPM | DIASTOLIC BLOOD PRESSURE: 82 MMHG | HEIGHT: 64 IN | WEIGHT: 218.6 LBS | SYSTOLIC BLOOD PRESSURE: 136 MMHG | RESPIRATION RATE: 16 BRPM

## 2024-03-19 ENCOUNTER — HOSPITAL ENCOUNTER (OUTPATIENT)
Facility: HOSPITAL | Age: 69
Discharge: HOME | End: 2024-03-22
Attending: INTERNAL MEDICINE
Payer: COMMERCIAL

## 2024-03-19 DIAGNOSIS — Z12.31 SCREENING MAMMOGRAM FOR BREAST CANCER: ICD-10-CM

## 2024-03-19 DIAGNOSIS — N95.9 MENOPAUSAL AND PERIMENOPAUSAL DISORDER: ICD-10-CM

## 2024-03-19 PROCEDURE — 77063 BREAST TOMOSYNTHESIS BI: CPT

## 2024-03-19 PROCEDURE — 77080 DXA BONE DENSITY AXIAL: CPT

## 2024-03-21 ENCOUNTER — TELEPHONE (OUTPATIENT)
Age: 69
End: 2024-03-21

## 2024-03-21 DIAGNOSIS — R92.8 ABNORMAL MAMMOGRAM: Primary | ICD-10-CM

## 2024-03-21 NOTE — TELEPHONE ENCOUNTER
Pls call    DEXA normal - recheck 5-7 years    Mammo abnormal on right - follow up diagnostic and US ordered

## 2024-03-22 NOTE — TELEPHONE ENCOUNTER
Called and spoke to patient, relayed message from Dr. Munoz. Patient verbalized understanding.    No further action required.

## 2024-03-29 NOTE — PROGRESS NOTES
Problem: Adult Inpatient Plan of Care  Goal: Plan of Care Review  Outcome: Ongoing, Progressing  Goal: Patient-Specific Goal (Individualized)  Outcome: Ongoing, Progressing  Goal: Absence of Hospital-Acquired Illness or Injury  Outcome: Ongoing, Progressing  Intervention: Identify and Manage Fall Risk  Recent Flowsheet Documentation  Taken 3/29/2024 0400 by Julia Gonzales RN  Safety Promotion/Fall Prevention:   safety round/check completed   room organization consistent   fall prevention program maintained   clutter free environment maintained   assistive device/personal items within reach  Taken 3/29/2024 0200 by Julia Gonzales RN  Safety Promotion/Fall Prevention:   safety round/check completed   room organization consistent   fall prevention program maintained   assistive device/personal items within reach   clutter free environment maintained  Taken 3/29/2024 0000 by Julia Gonzales RN  Safety Promotion/Fall Prevention:   safety round/check completed   room organization consistent   fall prevention program maintained   clutter free environment maintained   assistive device/personal items within reach  Intervention: Prevent Skin Injury  Recent Flowsheet Documentation  Taken 3/29/2024 0400 by Julia Gonzales RN  Body Position: position changed independently  Taken 3/29/2024 0000 by Julia Gonzales RN  Body Position: position changed independently  Skin Protection: adhesive use limited  Intervention: Prevent and Manage VTE (Venous Thromboembolism) Risk  Recent Flowsheet Documentation  Taken 3/29/2024 0400 by Julia Gonzales RN  Activity Management: activity encouraged  Taken 3/29/2024 0000 by Julia Gonzales RN  Activity Management: activity encouraged  Range of Motion: active ROM (range of motion) encouraged  Intervention: Prevent Infection  Recent Flowsheet Documentation  Taken 3/29/2024 0400 by Julia Gonzales RN  Infection Prevention:   single patient room provided   personal  [facility] Arlin Ruiz  [address] [facility]  [phone] fax    Speech Therapy Discharge Summary    Patient name: Drew Dominguez Start of Care: 19   Referral source: Cristy Blackwell MD : 1955   Medical/Treatment Diagnosis: Dysphonia [R49.0]  Payor: Bethel Brunson / Plan: [place] MED[place] A / Product Type: Medicare /  Onset Date: 2019     Prior Hospitalization: see medical history Provider#: [id number]   Medications: Verified on Patient Summary List    Comorbidities: Hx Lung Ca s/p Right lobectomy; COPD; Asthma; sleep apnea; obesity; hearing impairment; arthritis; GERD/LPR   Prior Level of Function: Voice/Speech/Language/swallowing WNLS. Bilateral SNHL.       Visits from Start of Care: 1    Missed Visits: 0    Reporting Period : 19    Summary of Care:  Goal: 1. Patient will demonstrate good vocal hygiene by  (a) decreasing coughing by ~50% (b) decreasing throat clearing by 50% (c) consume ~ 64 oz of water daily and (d) taking her PPI correctly for best absorption (e) decreasing caffeine by >50% in order to decrease trauma/irritation to the vocal folds to help improve her voice. Status at last note/certification:Patient exhibited a moderate  dyphonia c/b a moderately hoarse, harsh, rough,  and  Intermittently strained vocal quality in conversation and  vocal tasks. Status at discharge: not met    Goal: 2. Patient will use diaphragmatic/abdominal breath management with good connection of airflow to phonation for automatics, words, phrases and simple sentences with 80% acc with min-mod cues during structured phonation/speaking tasks to improve breath support/coordination warranted for improved voicing. Status at last note/certification:Patient exhibited a moderate  dyphonia c/b a moderately hoarse, harsh, rough,  and  Intermittently strained vocal quality in conversation and  vocal tasks.    Status at discharge: not met    Goal: 3. Patient will demonstrate a more forward placement of tone, easy onset of phonation and a legato speaking style with adequate loudness, decreased tension and improved pitch (higher) for automatics, words, phrases and short sentences with 70% acc with min-mod cues in order to improve her voice.    Status at last note/certification:Patient exhibited a moderate  dyphonia c/b a moderately hoarse, harsh, rough,  and  Intermittently strained vocal quality in conversation and  vocal tasks. Status at discharge: not met    Goal:4. Patient will perform vocal entrainment exercises with min-mod A in order to improve the balance between respiration, phonation and resonance and improve vocal strength and flexibility for improved vocal health and voice production.   Status at last note/certification:Patient exhibited a moderate  dyphonia c/b a moderately hoarse, harsh, rough,  and  Intermittently strained vocal quality in conversation and  vocal tasks. Status at discharge: not met        ASSESSMENT: d/c d/t non-compliance. Pt not seen since evaluation.      RECOMMENDATIONS:  [x]Discontinue therapy: []Patient has reached or is progressing toward set goals      [x]Patient is non-compliant or has abdicated      []Due to lack of appreciable progress towards set goals    Wojciech Alexander, SLP 7/24/2019 11:51 AM  MA, [address] protective equipment utilized   rest/sleep promoted   hand hygiene promoted   equipment surfaces disinfected  Taken 3/29/2024 0200 by Julia Gonzales RN  Infection Prevention:   single patient room provided   rest/sleep promoted   personal protective equipment utilized   hand hygiene promoted   equipment surfaces disinfected   environmental surveillance performed  Taken 3/29/2024 0000 by Julia Gonzales RN  Infection Prevention:   single patient room provided   rest/sleep promoted   personal protective equipment utilized   hand hygiene promoted   equipment surfaces disinfected   environmental surveillance performed  Goal: Optimal Comfort and Wellbeing  Outcome: Ongoing, Progressing  Intervention: Provide Person-Centered Care  Recent Flowsheet Documentation  Taken 3/29/2024 0000 by Julia Gonzales RN  Trust Relationship/Rapport: care explained  Goal: Readiness for Transition of Care  Outcome: Ongoing, Progressing     Problem: Swallowing Impairment  Goal: Optimal Eating/Swallowing without Aspir  Outcome: Ongoing, Progressing     Problem: Suicide Risk  Goal: Absence of Self-Harm  Outcome: Ongoing, Progressing  Intervention: Promote Psychosocial Wellbeing  Recent Flowsheet Documentation  Taken 3/29/2024 0000 by Julia Gonzales RN  Supportive Measures: active listening utilized  Family/Support System Care: support provided   Goal Outcome Evaluation:

## 2024-04-17 ENCOUNTER — HOSPITAL ENCOUNTER (OUTPATIENT)
Facility: HOSPITAL | Age: 69
Discharge: HOME | End: 2024-04-20
Attending: INTERNAL MEDICINE
Payer: COMMERCIAL

## 2024-04-17 DIAGNOSIS — R92.8 ABNORMAL MAMMOGRAM: ICD-10-CM

## 2024-04-17 PROCEDURE — 76642 ULTRASOUND BREAST LIMITED: CPT

## 2024-04-17 PROCEDURE — G0279 TOMOSYNTHESIS, MAMMO: HCPCS

## 2024-05-10 NOTE — ADDENDUM NOTE
Addended by: Faiza Phillips on: 10/16/2020 09:18 AM     Modules accepted: Orders Valeria Lees is a 58 y.o. female who presents today for   Chief Complaint   Patient presents with    Neck Pain    Back Pain    Knee Pain       HPI  59 y/o WF here for follow up on  follow up on recurrent migraine/tension HAs, chronic neck and low back chronic knee pain, and elevated BMI. Dr Gutierres with psychiatry saw patient for major depressive disorder and generalized anxiety disorder recently but Dr Gutierres was concerned about potential sleep apnea and ordered a Home Sleep Study to be done prior to changing medications and also recommended patient start counseling with behavioral health for her anxiety and depression.     BMI Readings from Last 3 Encounters:   05/10/24 28.22 kg/m²   05/09/24 28.00 kg/m²   05/09/24 28.34 kg/m²     Wt Readings from Last 3 Encounters:   05/10/24 67.8 kg (149 lb 6 oz)   05/09/24 67.2 kg (148 lb 3.2 oz)   05/09/24 68 kg (150 lb)     Xray Result (most recent):  XR LUMBAR SPINE (2-3 VIEWS) 03/25/2024    Narrative  EXAM: THREE VIEWS LUMBAR SPINE    HISTORY: Chronic bilateral low back pain with left-sided sciatica    COMPARISON: None available    FINDINGS:  There are five lumbar-type vertebrae.  The lumbar vertebral bodies are normal in height.  The disc spaces are maintained.  There is approximately 1 mm of posterior listhesis at L1-2 and L2-3.  Moderate L4-5 facet arthropathy is seen.    Impression  Moderate L4-5 facet arthropathy.        ______________________________________  Electronically signed by: ARIEL LLANES M.D.  Date:     03/26/2024  Time:    13:24     Narrative & Impression  EXAM:  CERVICAL SPINE FIVE VIEWS     HISTORY:  Chronic neck pain  - - - - -  IMPRESSION:  No scoliosis.  The lateral masses of C1 and C2 are normally aligned and the odontoid process is intact.  Lateral projection demonstrates normal vertebral body height and alignment without fracture or spondylolisthesis.  Mild facet arthropathy   is seen throughout the cervical spine.  Disc spaces are preserved.

## 2024-09-25 ENCOUNTER — TELEPHONE (OUTPATIENT)
Facility: CLINIC | Age: 69
End: 2024-09-25

## 2024-09-25 DIAGNOSIS — R92.8 ABNORMAL MAMMOGRAM: Primary | ICD-10-CM

## 2024-12-02 ENCOUNTER — TELEPHONE (OUTPATIENT)
Facility: CLINIC | Age: 69
End: 2024-12-02

## 2024-12-02 DIAGNOSIS — R92.8 ABNORMAL MAMMOGRAM: Primary | ICD-10-CM

## 2024-12-02 NOTE — TELEPHONE ENCOUNTER
----- Message from Danika IGNACIO sent at 11/27/2024 12:32 PM EST -----  Regarding: Need US order entered  Good afternoon,    Can you please enter in an order for the US breast using [id number], so that I can get the patient scheduled?      Thank you kindly

## 2025-01-27 ENCOUNTER — HOSPITAL ENCOUNTER (EMERGENCY)
Facility: HOSPITAL | Age: 70
Discharge: HOME | End: 2025-01-28
Attending: EMERGENCY MEDICINE
Payer: COMMERCIAL

## 2025-01-27 ENCOUNTER — APPOINTMENT (OUTPATIENT)
Facility: HOSPITAL | Age: 70
End: 2025-01-27
Payer: COMMERCIAL

## 2025-01-27 DIAGNOSIS — J10.1 INFLUENZA A: Primary | ICD-10-CM

## 2025-01-27 LAB
ALBUMIN SERPL-MCNC: 3.6 G/DL (ref 3.4–5)
ALBUMIN/GLOB SERPL: 0.9 (ref 0.8–1.7)
ALP SERPL-CCNC: 99 U/L (ref 45–117)
ALT SERPL-CCNC: 21 U/L (ref 13–56)
ANION GAP SERPL CALC-SCNC: 9 MMOL/L (ref 3–18)
AST SERPL-CCNC: 34 U/L (ref 10–38)
BILIRUB SERPL-MCNC: 0.5 MG/DL (ref 0.2–1)
BUN SERPL-MCNC: 19 MG/DL (ref 7–18)
BUN/CREAT SERPL: 18 (ref 12–20)
CALCIUM SERPL-MCNC: 8.7 MG/DL (ref 8.5–10.1)
CHLORIDE SERPL-SCNC: 103 MMOL/L (ref 100–111)
CO2 SERPL-SCNC: 22 MMOL/L (ref 21–32)
CREAT SERPL-MCNC: 1.04 MG/DL (ref 0.6–1.3)
FLUAV RNA SPEC QL NAA+PROBE: DETECTED
FLUBV RNA SPEC QL NAA+PROBE: NOT DETECTED
GLOBULIN SER CALC-MCNC: 3.8 G/DL (ref 2–4)
GLUCOSE BLD STRIP.AUTO-MCNC: 100 MG/DL (ref 70–110)
GLUCOSE SERPL-MCNC: 96 MG/DL (ref 74–99)
POTASSIUM SERPL-SCNC: 4.5 MMOL/L (ref 3.5–5.5)
PROT SERPL-MCNC: 7.4 G/DL (ref 6.4–8.2)
SARS-COV-2 RNA RESP QL NAA+PROBE: NOT DETECTED
SODIUM SERPL-SCNC: 134 MMOL/L (ref 136–145)
SOURCE: ABNORMAL

## 2025-01-27 PROCEDURE — 85025 COMPLETE CBC W/AUTO DIFF WBC: CPT

## 2025-01-27 PROCEDURE — 99285 EMERGENCY DEPT VISIT HI MDM: CPT

## 2025-01-27 PROCEDURE — 80053 COMPREHEN METABOLIC PANEL: CPT

## 2025-01-27 PROCEDURE — 82962 GLUCOSE BLOOD TEST: CPT

## 2025-01-27 PROCEDURE — 71045 X-RAY EXAM CHEST 1 VIEW: CPT

## 2025-01-27 PROCEDURE — 93005 ELECTROCARDIOGRAM TRACING: CPT | Performed by: EMERGENCY MEDICINE

## 2025-01-27 PROCEDURE — 87636 SARSCOV2 & INF A&B AMP PRB: CPT

## 2025-01-27 ASSESSMENT — PAIN DESCRIPTION - FREQUENCY: FREQUENCY: CONTINUOUS

## 2025-01-27 ASSESSMENT — PAIN DESCRIPTION - ONSET: ONSET: SUDDEN

## 2025-01-27 ASSESSMENT — PAIN DESCRIPTION - ORIENTATION: ORIENTATION: RIGHT

## 2025-01-27 ASSESSMENT — PAIN DESCRIPTION - LOCATION: LOCATION: GENERALIZED

## 2025-01-27 ASSESSMENT — PAIN DESCRIPTION - DESCRIPTORS: DESCRIPTORS: ACHING

## 2025-01-27 ASSESSMENT — PAIN SCALES - GENERAL: PAINLEVEL_OUTOF10: 10

## 2025-01-27 ASSESSMENT — PAIN DESCRIPTION - PAIN TYPE: TYPE: ACUTE PAIN

## 2025-01-27 ASSESSMENT — PAIN - FUNCTIONAL ASSESSMENT: PAIN_FUNCTIONAL_ASSESSMENT: ACTIVITIES ARE NOT PREVENTED

## 2025-01-27 ASSESSMENT — LIFESTYLE VARIABLES
HOW OFTEN DO YOU HAVE A DRINK CONTAINING ALCOHOL: NEVER
HOW MANY STANDARD DRINKS CONTAINING ALCOHOL DO YOU HAVE ON A TYPICAL DAY: PATIENT DOES NOT DRINK

## 2025-01-28 VITALS
TEMPERATURE: 98.6 F | OXYGEN SATURATION: 99 % | SYSTOLIC BLOOD PRESSURE: 121 MMHG | BODY MASS INDEX: 36.18 KG/M2 | HEART RATE: 82 BPM | WEIGHT: 211.9 LBS | DIASTOLIC BLOOD PRESSURE: 62 MMHG | RESPIRATION RATE: 30 BRPM | HEIGHT: 64 IN

## 2025-01-28 LAB
BASOPHILS # BLD: 0 K/UL (ref 0–0.1)
BASOPHILS NFR BLD: 0 % (ref 0–2)
DIFFERENTIAL METHOD BLD: ABNORMAL
EKG ATRIAL RATE: 89 BPM
EKG DIAGNOSIS: NORMAL
EKG P AXIS: 57 DEGREES
EKG P-R INTERVAL: 130 MS
EKG Q-T INTERVAL: 356 MS
EKG QRS DURATION: 72 MS
EKG QTC CALCULATION (BAZETT): 433 MS
EKG R AXIS: 66 DEGREES
EKG T AXIS: 63 DEGREES
EKG VENTRICULAR RATE: 89 BPM
EOSINOPHIL # BLD: 0 K/UL (ref 0–0.4)
EOSINOPHIL NFR BLD: 0 % (ref 0–5)
ERYTHROCYTE [DISTWIDTH] IN BLOOD BY AUTOMATED COUNT: 13.6 % (ref 11.6–14.5)
HCT VFR BLD AUTO: 39.9 % (ref 35–45)
HGB BLD-MCNC: 13.1 G/DL (ref 12–16)
IMM GRANULOCYTES # BLD AUTO: 0 K/UL
IMM GRANULOCYTES NFR BLD AUTO: 0 %
LYMPHOCYTES # BLD: 1.72 K/UL (ref 0.9–3.6)
LYMPHOCYTES NFR BLD: 41 % (ref 21–52)
MCH RBC QN AUTO: 30.9 PG (ref 24–34)
MCHC RBC AUTO-ENTMCNC: 32.8 G/DL (ref 31–37)
MCV RBC AUTO: 94.1 FL (ref 78–100)
MONOCYTES # BLD: 0.67 K/UL (ref 0.05–1.2)
MONOCYTES NFR BLD: 16 % (ref 3–10)
NEUTS SEG # BLD: 1.81 K/UL (ref 1.8–8)
NEUTS SEG NFR BLD: 43 % (ref 40–73)
NRBC # BLD: 0 K/UL (ref 0–0.01)
NRBC BLD-RTO: 0 PER 100 WBC
PLATELET # BLD AUTO: 217 K/UL (ref 135–420)
PLATELET COMMENT: ABNORMAL
PMV BLD AUTO: 10.3 FL (ref 9.2–11.8)
RBC # BLD AUTO: 4.24 M/UL (ref 4.2–5.3)
RBC MORPH BLD: ABNORMAL
WBC # BLD AUTO: 4.2 K/UL (ref 4.6–13.2)

## 2025-01-28 PROCEDURE — 6370000000 HC RX 637 (ALT 250 FOR IP): Performed by: EMERGENCY MEDICINE

## 2025-01-28 PROCEDURE — 94760 N-INVAS EAR/PLS OXIMETRY 1: CPT

## 2025-01-28 PROCEDURE — 93010 ELECTROCARDIOGRAM REPORT: CPT | Performed by: INTERNAL MEDICINE

## 2025-01-28 RX ORDER — OSELTAMIVIR PHOSPHATE 30 MG/1
30 CAPSULE ORAL 2 TIMES DAILY
Qty: 10 CAPSULE | Refills: 0 | Status: SHIPPED | OUTPATIENT
Start: 2025-01-28 | End: 2025-02-02

## 2025-01-28 RX ORDER — ACETAMINOPHEN 500 MG
1000 TABLET ORAL
Status: COMPLETED | OUTPATIENT
Start: 2025-01-28 | End: 2025-01-28

## 2025-01-28 RX ADMIN — ACETAMINOPHEN 1000 MG: 500 TABLET ORAL at 00:31

## 2025-01-28 ASSESSMENT — PAIN DESCRIPTION - ORIENTATION: ORIENTATION: RIGHT

## 2025-01-28 ASSESSMENT — PAIN SCALES - GENERAL: PAINLEVEL_OUTOF10: 10

## 2025-01-28 ASSESSMENT — PAIN DESCRIPTION - LOCATION: LOCATION: GENERALIZED

## 2025-01-28 ASSESSMENT — PAIN - FUNCTIONAL ASSESSMENT: PAIN_FUNCTIONAL_ASSESSMENT: ACTIVITIES ARE NOT PREVENTED

## 2025-01-28 ASSESSMENT — PAIN DESCRIPTION - DESCRIPTORS: DESCRIPTORS: ACHING;SORE

## 2025-01-28 NOTE — ED PROVIDER NOTES
[facility] EMERGENCY DEPARTMENT  EMERGENCY DEPARTMENT ENCOUNTER    Patient Name: Barbie Farrell  MRN: [id number]  YOB: 1955  Provider: Juan Luis Aguilar DO  PCP: Oscar Munoz MD   Time/Date of evaluation: 12:18 AM EST on 1/28/25    History of Presenting Illness     History Provided by: Patient  History is limited by: Nothing     HISTORY:   Barbie Farrell is a 69 y.o. female presents with a chief complaint of nonproductive cough.  Patient states that she has been having.  Patient states stress incontinence secondary to coughing.  She states that she got up this morning at approximately 9 AM and began coughing.  Patient states that she fell into a coffee table.  She is reporting that her nonproductive cough began 2 days ago.  She denies any known sick contacts.  She reports generalized pain down the right side of her body secondary to the fall.  She denies any abrasions or lacerations.    Nursing Notes were all reviewed and agreed with or any disagreements were addressed in the HPI.    Past History     PAST MEDICAL HISTORY:  Past Medical History:   Diagnosis Date    Asthma     Dr. Saab    Blindness of right eye     Dr Rojas; central retinal vein occlusion    Colon adenoma     2009 Dr. Montana; adenoma and diverticulosis 9/12 Dr. Montes; 12/26/17; Dr Montes 3/1/22    Coronary artery calcification 11/2023    dec statin repeatedly    Emphysema lung (HCC)     centrilobular and right lung nodules (6/14); Dr Saab (8/19); now Dr MCKENZIE Velasco/jaxon (2023) FEV1 74 no change postbd, ratio 74, unable to do volumes/dlco (11/23)    Glaucoma     Dr Dixon    H/O cardiovascular stress test     NST (10/13) low risk, ef 82%, tid 0.88;  stress echo (12/17) neg; NST (10.23) low risk, ef 82%, TID 0.88    H/O echocardiogram     ef 65%, gr 1 dd, no wma (12/17)    Hearing loss 2022    bilateral    Hyperlipidemia 05/14/2018    calculated 10 year risk score 8.3% (3/20); declined

## 2025-01-28 NOTE — ED NOTES
Nursing protocol orders placed, cardiac  monitor in place call bell in reach. Perry provided fr comfort.

## 2025-01-28 NOTE — ED TRIAGE NOTES
Pt to ED reports  fall onset this am reports LOC, denies thinners , unsure if she hit her head . Pt sttes hit right side on the coffee table. Pt reports right sided pain, dizziness and headache. Pt states she has been sick with cough also

## 2025-01-31 ENCOUNTER — HOSPITAL ENCOUNTER (OUTPATIENT)
Facility: HOSPITAL | Age: 70
End: 2025-01-31
Attending: INTERNAL MEDICINE
Payer: COMMERCIAL

## 2025-01-31 ENCOUNTER — HOSPITAL ENCOUNTER (OUTPATIENT)
Facility: HOSPITAL | Age: 70
Discharge: HOME | End: 2025-01-31
Attending: INTERNAL MEDICINE
Payer: COMMERCIAL

## 2025-01-31 VITALS — WEIGHT: 220 LBS | BODY MASS INDEX: 37.56 KG/M2 | HEIGHT: 64 IN

## 2025-01-31 DIAGNOSIS — R92.8 ABNORMAL MAMMOGRAM: ICD-10-CM

## 2025-01-31 PROCEDURE — G0279 TOMOSYNTHESIS, MAMMO: HCPCS

## 2025-02-06 ENCOUNTER — TELEMEDICINE (OUTPATIENT)
Facility: CLINIC | Age: 70
End: 2025-02-06
Payer: COMMERCIAL

## 2025-02-06 DIAGNOSIS — J10.1 INFLUENZA A: ICD-10-CM

## 2025-02-06 DIAGNOSIS — R21 RASH: Primary | ICD-10-CM

## 2025-02-06 DIAGNOSIS — R92.8 ABNORMAL MAMMOGRAM: ICD-10-CM

## 2025-02-06 PROCEDURE — 99213 OFFICE O/P EST LOW 20 MIN: CPT | Performed by: INTERNAL MEDICINE

## 2025-02-06 PROCEDURE — 1124F ACP DISCUSS-NO DSCNMKR DOCD: CPT | Performed by: INTERNAL MEDICINE

## 2025-02-06 SDOH — ECONOMIC STABILITY: FOOD INSECURITY: WITHIN THE PAST 12 MONTHS, THE FOOD YOU BOUGHT JUST DIDN'T LAST AND YOU DIDN'T HAVE MONEY TO GET MORE.: NEVER TRUE

## 2025-02-06 SDOH — ECONOMIC STABILITY: FOOD INSECURITY: WITHIN THE PAST 12 MONTHS, YOU WORRIED THAT YOUR FOOD WOULD RUN OUT BEFORE YOU GOT MONEY TO BUY MORE.: NEVER TRUE

## 2025-02-06 ASSESSMENT — PATIENT HEALTH QUESTIONNAIRE - PHQ9
SUM OF ALL RESPONSES TO PHQ QUESTIONS 1-9: 0
1. LITTLE INTEREST OR PLEASURE IN DOING THINGS: NOT AT ALL
2. FEELING DOWN, DEPRESSED OR HOPELESS: NOT AT ALL
SUM OF ALL RESPONSES TO PHQ QUESTIONS 1-9: 0
SUM OF ALL RESPONSES TO PHQ9 QUESTIONS 1 & 2: 0

## 2025-02-06 NOTE — PROGRESS NOTES
Barbie Kayceeia Bud 69 y.o. who was seen by synchronous (real-time) audio-video technology for   Chief Complaint   Patient presents with    Follow-up    Influenza             Assessment & Plan:      Diagnosis Orders   1. Rash  External Referral To Dermatology      2. Influenza A        3. Abnormal mammogram          712  Subjective:   Here to follow-up after her ER visit.      She came down with flulike symptoms on 1/25/2025.  Described nonproductive cough, generalized achiness.  On 1/27/2025, she was coughing so hard that she fell onto a coffee table.  No known sick contacts.  Went to the emergency room, COVID test negative but influenza a positive, chest x-ray unremarkable and labs okay.  She was sent home on Tamiflu.  Overall, she feels quite a bit better, just a few aches and pains which she is using Tiger balm for.    She is requesting referral to dermatology for rash in her neck and back.  Lastly, she had follow-up mammogram which did come back BI-RADS 1.    Objective:     Past Medical History:   Diagnosis Date    Asthma     Dr. Saab    Blindness of right eye     Dr Rojas; central retinal vein occlusion    Colon adenoma     2009 Dr. Montana; adenoma and diverticulosis 9/12 Dr. Montes; 12/26/17; Dr Montes 3/1/22    Coronary artery calcification 11/2023    dec statin repeatedly    Emphysema lung (HCC)     centrilobular and right lung nodules (6/14); Dr Saab (8/19); now Dr MCKENZIE Velasco/jaxon (2023) FEV1 74 no change postbd, ratio 74, unable to do volumes/dlco (11/23)    Glaucoma     Dr Dixon    H/O cardiovascular stress test     NST (10/13) low risk, ef 82%, tid 0.88;  stress echo (12/17) neg; NST (10.23) low risk, ef 82%, TID 0.88    H/O echocardiogram     ef 65%, gr 1 dd, no wma (12/17)    Hearing loss 2022    bilateral    Hyperlipidemia 05/14/2018    calculated 10 year risk score 8.3% (3/20); declined statins repeatedly    IFG (impaired fasting glucose) 2014    Immunization declined 2022    flu,

## 2025-02-06 NOTE — PROGRESS NOTES
Barbie Farrell presents today for   Chief Complaint   Patient presents with    Follow-up           \"Have you been to the ER, urgent care clinic since your last visit? 01- [facility] ED Hospitalized since your last visit?\"    NO    “Have you seen or consulted any other health care providers outside of [facility] since your last visit?”    NO

## 2025-06-09 ENCOUNTER — TELEPHONE (OUTPATIENT)
Facility: CLINIC | Age: 70
End: 2025-06-09

## 2025-06-09 DIAGNOSIS — R73.01 IFG (IMPAIRED FASTING GLUCOSE): Primary | ICD-10-CM

## 2025-06-09 DIAGNOSIS — E78.5 HYPERLIPIDEMIA, UNSPECIFIED HYPERLIPIDEMIA TYPE: ICD-10-CM

## 2025-06-09 NOTE — TELEPHONE ENCOUNTER
----- Message from BRADLEY CINTRON LPN sent at 6/9/2025  8:25 AM EDT -----  Regarding: FW: ECC Message to Provider    ----- Message -----  From: Carissa Pierce  Sent: 6/6/2025   3:11 PM EDT  To: [facility] Clinical Staff  Subject: ECC Message to Provider                          ECC Message to Provider    Relationship to Patient: Self     Additional Information: Patient did not received a call back from the office regarding his blood work order and she needs it before the appointment on next week Friday, she prefer the blood work on lab cara in [facility] and preferred afternoon   --------------------------------------------------------------------------------------------------------------------------    Call Back Information: OK to leave message on voicemail  Preferred Call Back Number: Phone

## 2025-06-11 NOTE — TELEPHONE ENCOUNTER
Labs ordered     Diagnosis Orders   1. IFG (impaired fasting glucose)  HEMOGLOBIN A1C W/O EAG      2. Hyperlipidemia, unspecified hyperlipidemia type  Comprehensive Metabolic Panel    CBC with Auto Differential    Lipid Panel

## 2025-06-19 LAB
ALBUMIN SERPL-MCNC: 4 G/DL (ref 3.8–4.8)
ALP SERPL-CCNC: 111 IU/L (ref 44–121)
ALT SERPL-CCNC: 12 IU/L (ref 0–32)
AST SERPL-CCNC: 15 IU/L (ref 0–40)
BASOPHILS # BLD AUTO: 0 X10E3/UL (ref 0–0.2)
BASOPHILS NFR BLD AUTO: 1 %
BILIRUB SERPL-MCNC: 0.5 MG/DL (ref 0–1.2)
BUN SERPL-MCNC: 9 MG/DL (ref 8–27)
BUN/CREAT SERPL: 14 (ref 12–28)
CALCIUM SERPL-MCNC: 9.2 MG/DL (ref 8.7–10.3)
CHLORIDE SERPL-SCNC: 106 MMOL/L (ref 96–106)
CHOLEST SERPL-MCNC: 200 MG/DL (ref 100–199)
CO2 SERPL-SCNC: 23 MMOL/L (ref 20–29)
CREAT SERPL-MCNC: 0.65 MG/DL (ref 0.57–1)
EGFRCR SERPLBLD CKD-EPI 2021: 94 ML/MIN/1.73
EOSINOPHIL # BLD AUTO: 0.2 X10E3/UL (ref 0–0.4)
EOSINOPHIL NFR BLD AUTO: 3 %
ERYTHROCYTE [DISTWIDTH] IN BLOOD BY AUTOMATED COUNT: 12.7 % (ref 11.7–15.4)
EST. AVERAGE GLUCOSE BLD GHB EST-MCNC: 105 MG/DL
GLOBULIN SER CALC-MCNC: 2.8 G/DL (ref 1.5–4.5)
GLUCOSE SERPL-MCNC: 109 MG/DL (ref 70–99)
HBA1C MFR BLD: 5.3 % (ref 4.8–5.6)
HCT VFR BLD AUTO: 36.9 % (ref 34–46.6)
HDLC SERPL-MCNC: 78 MG/DL
HGB BLD-MCNC: 11.9 G/DL (ref 11.1–15.9)
IMM GRANULOCYTES # BLD AUTO: 0 X10E3/UL (ref 0–0.1)
IMM GRANULOCYTES NFR BLD AUTO: 0 %
LDLC SERPL CALC-MCNC: 110 MG/DL (ref 0–99)
LYMPHOCYTES # BLD AUTO: 2.4 X10E3/UL (ref 0.7–3.1)
LYMPHOCYTES NFR BLD AUTO: 40 %
MCH RBC QN AUTO: 30.9 PG (ref 26.6–33)
MCHC RBC AUTO-ENTMCNC: 32.2 G/DL (ref 31.5–35.7)
MCV RBC AUTO: 96 FL (ref 79–97)
MONOCYTES # BLD AUTO: 0.4 X10E3/UL (ref 0.1–0.9)
MONOCYTES NFR BLD AUTO: 7 %
NEUTROPHILS # BLD AUTO: 2.9 X10E3/UL (ref 1.4–7)
NEUTROPHILS NFR BLD AUTO: 49 %
PLATELET # BLD AUTO: 232 X10E3/UL (ref 150–450)
POTASSIUM SERPL-SCNC: 4.5 MMOL/L (ref 3.5–5.2)
PROT SERPL-MCNC: 6.8 G/DL (ref 6–8.5)
RBC # BLD AUTO: 3.85 X10E6/UL (ref 3.77–5.28)
SODIUM SERPL-SCNC: 144 MMOL/L (ref 134–144)
TRIGL SERPL-MCNC: 68 MG/DL (ref 0–149)
VLDLC SERPL CALC-MCNC: 12 MG/DL (ref 5–40)
WBC # BLD AUTO: 5.9 X10E3/UL (ref 3.4–10.8)

## 2025-06-25 ENCOUNTER — OFFICE VISIT (OUTPATIENT)
Facility: CLINIC | Age: 70
End: 2025-06-25
Payer: COMMERCIAL

## 2025-06-25 DIAGNOSIS — E78.5 HYPERLIPIDEMIA, UNSPECIFIED HYPERLIPIDEMIA TYPE: ICD-10-CM

## 2025-06-25 DIAGNOSIS — I87.2 VENOUS INSUFFICIENCY: ICD-10-CM

## 2025-06-25 DIAGNOSIS — J44.9 CHRONIC OBSTRUCTIVE PULMONARY DISEASE, UNSPECIFIED COPD TYPE (HCC): ICD-10-CM

## 2025-06-25 DIAGNOSIS — R03.0 ELEVATED BLOOD PRESSURE READING WITHOUT DIAGNOSIS OF HYPERTENSION: ICD-10-CM

## 2025-06-25 DIAGNOSIS — D12.6 COLON ADENOMA: ICD-10-CM

## 2025-06-25 DIAGNOSIS — M25.561 CHRONIC PAIN OF RIGHT KNEE: ICD-10-CM

## 2025-06-25 DIAGNOSIS — G89.29 CHRONIC PAIN OF RIGHT KNEE: ICD-10-CM

## 2025-06-25 DIAGNOSIS — G47.33 OSA (OBSTRUCTIVE SLEEP APNEA): ICD-10-CM

## 2025-06-25 DIAGNOSIS — I25.10 CORONARY ARTERY CALCIFICATION: ICD-10-CM

## 2025-06-25 DIAGNOSIS — Z85.118 HX OF CANCER OF LUNG: ICD-10-CM

## 2025-06-25 DIAGNOSIS — Z00.00 PHYSICAL EXAM: Primary | ICD-10-CM

## 2025-06-25 DIAGNOSIS — E66.01 SEVERE OBESITY (BMI 35.0-39.9) WITH COMORBIDITY (HCC): ICD-10-CM

## 2025-06-25 DIAGNOSIS — R73.01 IFG (IMPAIRED FASTING GLUCOSE): ICD-10-CM

## 2025-06-25 PROCEDURE — 99397 PER PM REEVAL EST PAT 65+ YR: CPT | Performed by: INTERNAL MEDICINE

## 2025-06-25 NOTE — PROGRESS NOTES
Barbie Farrell presents today for   Chief Complaint   Patient presents with    Annual Exam       \"Have you been to the ER, urgent care clinic since your last visit?  Hospitalized since your last visit?\"    NO    “Have you seen or consulted any other health care providers outside of [facility] since your last visit?”    YES - When: approximately 1 months ago.  Where and Why: [facility]: skin issue.

## 2025-06-25 NOTE — PROGRESS NOTES
70 y.o. female who presents for RPE    She continues to see Dr Dixon and Dr Rojas/Dr Sebastian for right eye blindness from central retinal vein occlusion    No exercise much due to orthopedic issues.  No cp, pnd, edema.  Not checking her bp outside.  She's been having some resp issues as her house needed an HVAC replacement but they messed with her duct work and she's been getting insulation blowing into the living space.    Seeing Dr Velasco and has appt next month.  Doing well on symbicort    No GI or  complaints    She brought out a whole list of foods and we went over each one to discuss whether she should keep eating or avoid.  Emphasized low fat approach for her chol but also low carb for her IFG and obesity.  Should also dec portions, inc exercise and even consider NEELAM.  She likes to eat a lot of fried foods, unfortunately    Her right knee has been hurting her and interested in seeing ortho.  She had a fall a few months back which triggered the flare.  Previously saw Dr Suárez and Dr Jenkins years ago    LAST MEDICARE WELLNESS EXAM: never as not medicare b    Past Medical History:   Diagnosis Date    Asthma     Dr. Saab    Blindness of right eye     Dr Rojas; central retinal vein occlusion    Colon adenoma     2009 Dr. [place]; adenoma and diverticulosis 9/12 Dr. Montes; 12/26/17; Dr Montes 3/1/22    Coronary artery calcification 11/2023    dec statin repeatedly    Emphysema lung (HCC)     centrilobular and right lung nodules (6/14); Dr Saab (8/19); now Dr MCKENZIE Velasco/jaxon (2023) FEV1 74 no change postbd, ratio 74, unable to do volumes/dlco (11/23)    Glaucoma     Dr Dixon    H/O cardiovascular stress test     NST (10/13) low risk, ef 82%, tid 0.88;  stress echo (12/17) neg; NST (10.23) low risk, ef 82%, TID 0.88    H/O echocardiogram     ef 65%, gr 1 dd, no wma (12/17)    Hearing loss 2022    bilateral    Hyperlipidemia 05/14/2018    calculated 10 year risk score 8.3% (3/20); declined statins

## 2025-06-26 VITALS
HEIGHT: 64 IN | BODY MASS INDEX: 36.88 KG/M2 | HEART RATE: 74 BPM | SYSTOLIC BLOOD PRESSURE: 148 MMHG | TEMPERATURE: 98 F | WEIGHT: 216 LBS | OXYGEN SATURATION: 97 % | RESPIRATION RATE: 16 BRPM | DIASTOLIC BLOOD PRESSURE: 83 MMHG

## 2025-06-26 PROBLEM — I25.10 CORONARY ARTERY CALCIFICATION: Status: ACTIVE | Noted: 2025-06-26

## (undated) DEVICE — SNARE POLYP M W27MMXL240CM OVL STIFF DISP CAPTIVATOR

## (undated) DEVICE — FLUFF AND POLYMER UNDERPAD,EXTRA HEAVY: Brand: WINGS

## (undated) DEVICE — FLEX ADVANTAGE 3000CC: Brand: FLEX ADVANTAGE

## (undated) DEVICE — MEDI-VAC SUCTION HIGH CAPACITY: Brand: CARDINAL HEALTH

## (undated) DEVICE — ENDOSCOPY PUMP TUBING/ CAP SET: Brand: ERBE

## (undated) DEVICE — CANNULA NSL AD TBNG L14FT STD PVC O2 CRV CONN NONFLARED NSL

## (undated) DEVICE — GOWN ISOL IMPERV UNIV, DISP, OPEN BACK, BLUE --

## (undated) DEVICE — GAUZE,SPONGE,4"X4",16PLY,STRL,LF,10/TRAY: Brand: MEDLINE

## (undated) DEVICE — SOLUTION IRRIG 1000ML H2O STRL BLT

## (undated) DEVICE — FORCEPS BX L240CM JAW DIA2.8MM L CAP W/ NDL MIC MESH TOOTH

## (undated) DEVICE — CATHETER SUCT TR FL TIP 14FR W/ O CTRL

## (undated) DEVICE — MEDI-VAC NON-CONDUCTIVE SUCTION TUBING: Brand: CARDINAL HEALTH

## (undated) DEVICE — CANNULA ORIG TL CLR W FOAM CUSHIONS AND 14FT SUPL TB 3 CHN